# Patient Record
Sex: MALE | Race: WHITE | Employment: PART TIME | URBAN - METROPOLITAN AREA
[De-identification: names, ages, dates, MRNs, and addresses within clinical notes are randomized per-mention and may not be internally consistent; named-entity substitution may affect disease eponyms.]

---

## 2019-01-12 ENCOUNTER — HOSPITAL ENCOUNTER (EMERGENCY)
Facility: CLINIC | Age: 79
Discharge: HOME | End: 2019-01-12
Attending: EMERGENCY MEDICINE | Admitting: EMERGENCY MEDICINE
Payer: COMMERCIAL

## 2019-01-12 VITALS
RESPIRATION RATE: 16 BRPM | SYSTOLIC BLOOD PRESSURE: 174 MMHG | HEART RATE: 97 BPM | WEIGHT: 169 LBS | OXYGEN SATURATION: 97 % | BODY MASS INDEX: 29.95 KG/M2 | DIASTOLIC BLOOD PRESSURE: 88 MMHG | TEMPERATURE: 97.7 F | HEIGHT: 63 IN

## 2019-01-12 DIAGNOSIS — R04.0 EPISTAXIS: ICD-10-CM

## 2019-01-12 PROCEDURE — 99284 EMERGENCY DEPT VISIT MOD MDM: CPT | Mod: 25 | Performed by: EMERGENCY MEDICINE

## 2019-01-12 PROCEDURE — 99284 EMERGENCY DEPT VISIT MOD MDM: CPT

## 2019-01-12 PROCEDURE — 27210182 ZZH KIT NASAL PACKING

## 2019-01-12 PROCEDURE — 30903 CONTROL OF NOSEBLEED: CPT

## 2019-01-12 RX ORDER — LIDOCAINE HYDROCHLORIDE AND EPINEPHRINE 10; 10 MG/ML; UG/ML
INJECTION, SOLUTION INFILTRATION; PERINEURAL
Status: DISCONTINUED
Start: 2019-01-12 | End: 2019-01-12 | Stop reason: HOSPADM

## 2019-01-12 RX ORDER — GINSENG 100 MG
CAPSULE ORAL
Status: DISCONTINUED
Start: 2019-01-12 | End: 2019-01-12 | Stop reason: HOSPADM

## 2019-01-12 RX ORDER — TRANEXAMIC ACID 100 MG/ML
500 INJECTION, SOLUTION INTRAVENOUS ONCE
Status: DISCONTINUED | OUTPATIENT
Start: 2019-01-12 | End: 2019-01-12 | Stop reason: HOSPADM

## 2019-01-12 RX ORDER — OXYMETAZOLINE HYDROCHLORIDE 0.05 G/100ML
SPRAY NASAL
Status: DISCONTINUED
Start: 2019-01-12 | End: 2019-01-12 | Stop reason: HOSPADM

## 2019-01-12 ASSESSMENT — MIFFLIN-ST. JEOR: SCORE: 1391.71

## 2019-01-12 NOTE — ED AVS SNAPSHOT
[facility] Emergency Department  [address]  [facility] [address]  Phone:  [phone]  Fax:  [phone]                                    Jayden Don   MRN: [id number]    Department:  [facility] Emergency Department   Date of Visit:  1/12/2019           After Visit Summary Signature Page    I have received my discharge instructions, and my questions have been answered. I have discussed any challenges I see with this plan with the nurse or doctor.    ..........................................................................................................................................  Patient/Patient Representative Signature      ..........................................................................................................................................  Patient Representative Print Name and Relationship to Patient    ..................................................               ................................................  Date                                   Time    ..........................................................................................................................................  Reviewed by Signature/Title    ...................................................              ..............................................  Date                                               Time          22EPIC Rev 08/18

## 2019-01-13 NOTE — ED PROVIDER NOTES
"  History     Chief Complaint:  Epistaxis    HPI   Jayden Don is a 78 year old male with history of COPD, anxiety, hypertension, hyperlipidemia among others who presents for evaluation of epistaxis. His nose began bleeding while sitting watching TV, and it began \"dripping\". He got the bleeding to stop, but it then restarted just prior to arrival to the ED. Clamp was applied on arrival to the ED. Bleeding is from the right nare. He is not on blood thinners aside from a baby aspirin. No recent changes in his health. No rash.      Allergies:  Atorvastatin - myalgias    Medications:    ANDROGEL 50 MG/5GM TD PACK  ASPIRIN 325 MG OR TBEC  GLUCOSAMINE-CHONDROITIN 750-600 MG OR TABS  TRIAMTERENE-HCTZ 37.5-25 MG OR CAPS  VIAGRA 100 MG OR TABS  VITAMIN E 800 IU OR CAPS  ZITHROMAX Z-CHETAN 250 MG OR CAPS    Past Medical History:    Obstructive chronic bronchitis with acute exacerbation  Hyperlipidemia  Hypertension  Inguinal hernia  Major depressive disorder  Anxiety  Pulmonary fibrosis  Type 2 diabetes mellitus  Diverticulosis  Coronary artery disease - with stenosis of left carotid artery     Past Surgical History:    Nasal sinus surgery   Finger trigger release    Family History:    CAD  Breast cancer  Macular degeneration  Neurologic disorder - parkinson    Social History:  Relationship status:   Tobacco use: No  Alcohol use: Yes  The patient presents with his wife.    Marital Status:   [2]     Review of Systems   HENT: Positive for nosebleeds.    Skin: Negative for rash.   All other systems reviewed and are negative.      Physical Exam     Patient Vitals for the past 24 hrs:   BP Pulse Resp SpO2 Height Weight   01/12/19 1838 174/88 106 18 97 % 1.6 m (5' 3\") 76.7 kg (169 lb)        Physical Exam    General: Patient is alert and interactive when I enter the room  Head:  The scalp, face, and head appear normal  Eyes:  Conjunctivae are normal  ENT:    Bleeding in the right nare. Area bleeding was kesselbachs " triangle, tried multiple interventions but bled through them.     Pinnae are normal    External acoustic canals are normal  Neck:  Trachea midline  CV:  Pulses are normal.   Resp:  No respiratory distress   Musc:  Normal muscular tone    No major joint effusions    No asymmetric leg swelling    Good capillary refill noted  Skin:  No rash or lesions noted. No petichiae.   Neuro: Speech is normal and fluent. Face is symmetric.     Moving all extremities well.   Psych:  Awake. Alert.  Normal affect.  Appropriate interactions.    Emergency Department Course     Procedures:    PROCEDURE NOTE:     Physician: Juan Carlos Kaye MD    Procedure:  Nosebleed/Epistaxis management    Technique:  The patient's affected nare was prepped with atomized lidocaine 1% with epinephrine.  Any obvious bleeding areas were cauterized with silver nitrate.  The nose was packed with a Rapid Rhino, and the bleeding was controlled.  After a period of observation, the patient was reassessed and no further bleeding is noted.  There were no complications to the procedure.     Interventions:  1913 Tranexamic acid spray 500 mg intranasal.   Medications   oxymetazoline (AFRIN) 0.05 % spray (not administered)   tranexamic acid (CYKLOKAPRON) spray 500 mg (not administered)   silver nitrate (ARZOL) Misc 4 applicator (not administered)   silver nitrate (ARZOL) Misc 4 applicator (not administered)   lidocaine 1% with EPINEPHrine 1:100,000 1 %-1:[id number] injection (not administered)   bacitracin 500 UNIT/GM ointment (not administered)        Emergency Department Course:  Nursing notes and vitals reviewed.    1841 I performed an exam of the patient as documented above.   1913 I reassessed the patient. I administered tranexamic acid to the right nare.   1948 I performed the nasal cautery and packing procedure.    Findings and plan explained to the patient and spouse. Patient discharged home with instructions regarding supportive care, medications, and reasons to  return. The importance of close follow-up was reviewed.      I personally reviewed the laboratory results with the patient and spouse and answered all related questions prior to discharge.    Impression & Plan      Medical Decision Making:  Jayden Don is a 78 year old male who presents for evaluation of nasal bleeding and epistaxis.  The bleeding was controlled finally with packing; tried silver nitrate multiple times, afrin and TXA without success before this. Close follow-up with ENT and primary care; see discharge instructions. There are no signs of coagulopathy causing the bleeding or a general medical condition (thrombocytopenia, DIC, leukemia, etc) causing the bleeding today.    The bleeding continued despite attempts at conservative management.  The risks and benefits of packing were discussed with patient verbally and they consented to packing.  See procedure note above for packing.  There was no bleeding after the nasal pack was placed.  I will place patient on prophylactic antibiotics to minimize risk of sinusitis and toxic shock syndrome.       Diagnosis:    ICD-10-CM    1. Epistaxis R04.0            CMS Diagnoses:   and None            Current Discharge Medication List      CONTINUE these medications which have NOT CHANGED    Details   ANDROGEL 50 MG/5GM TD PACK 1 pack topically q day  Qty: 30, Refills: 11    Comments: last fill date 11/3/03      ASPIRIN 325 MG OR TBEC 1 tab po QD (Once per day)      GLUCOSAMINE-CHONDROITIN 750-600 MG OR TABS 1 tablet at bedtime      LOTRISONE 1-0.05 % EX CREA apply as directed  Qty: 30 gm tube, Refills: 3    Associated Diagnoses: Dermatophytosis of foot      TRIAMTERENE-HCTZ 37.5-25 MG OR CAPS 1 CAPSULE DAILY  Qty: 60, Refills: 0      VIAGRA 100 MG OR TABS 1 TABLET BY MOUTH AS DIRECTED  Qty: 6, Refills: 6      VITAMIN E 800 IU OR CAPS 1 tablet daily      ZITHROMAX Z-CHETAN 250 MG OR CAPS 2 tablets day 1, 1 tablet days 2-5  Qty: 6, Refills: 0             Scribe  Disclosure:  I, Stephanie Byrd, am serving as a scribe at 6:39 PM on 1/12/2019 to document services personally performed by Juan Carlos Kaye MD, based on my observations and the provider's statements to me.     Stephanie Byrd  1/12/2019   [facility] EMERGENCY DEPARTMENT       Juan Carlos Kaye MD  01/12/19 0023

## 2019-01-13 NOTE — ED NOTES
RN instructed by MD to remove clamp, have patient blow his nose, and then spray 4 sprays of afrin into right nare and replace clamp.  After patient blew his nose, bright red bleeding from right nare immediately.  RN sprayed 4 sprays of afrin into right nare and replaced clamp.  MD informed.

## 2019-01-13 NOTE — ED TRIAGE NOTES
ABCs intact. Pt c/o epistaxis. Pt states he had a nose bleed this afternoon and again about 15-20 min pta. Pt takes aspirin 81mg daily.

## 2019-05-10 ENCOUNTER — HOSPITAL ENCOUNTER (INPATIENT)
Facility: CLINIC | Age: 79
LOS: 6 days | Discharge: HOME | DRG: 982 | End: 2019-05-16
Attending: EMERGENCY MEDICINE | Admitting: SURGERY
Payer: COMMERCIAL

## 2019-05-10 ENCOUNTER — ANESTHESIA (OUTPATIENT)
Dept: INTERVENTIONAL RADIOLOGY/VASCULAR | Facility: CLINIC | Age: 79
End: 2019-05-10

## 2019-05-10 ENCOUNTER — APPOINTMENT (OUTPATIENT)
Dept: INTERVENTIONAL RADIOLOGY/VASCULAR | Facility: CLINIC | Age: 79
DRG: 982 | End: 2019-05-10
Attending: NURSE PRACTITIONER
Payer: COMMERCIAL

## 2019-05-10 ENCOUNTER — HOSPITAL ENCOUNTER (EMERGENCY)
Facility: CLINIC | Age: 79
Discharge: ANOTHER HOSPITAL | End: 2019-05-10
Attending: EMERGENCY MEDICINE | Admitting: EMERGENCY MEDICINE
Payer: COMMERCIAL

## 2019-05-10 ENCOUNTER — ANESTHESIA EVENT (OUTPATIENT)
Dept: INTERVENTIONAL RADIOLOGY/VASCULAR | Facility: CLINIC | Age: 79
End: 2019-05-10

## 2019-05-10 ENCOUNTER — APPOINTMENT (OUTPATIENT)
Dept: GENERAL RADIOLOGY | Facility: CLINIC | Age: 79
End: 2019-05-10
Attending: EMERGENCY MEDICINE
Payer: COMMERCIAL

## 2019-05-10 ENCOUNTER — APPOINTMENT (OUTPATIENT)
Dept: CT IMAGING | Facility: CLINIC | Age: 79
End: 2019-05-10
Attending: EMERGENCY MEDICINE
Payer: COMMERCIAL

## 2019-05-10 VITALS
SYSTOLIC BLOOD PRESSURE: 86 MMHG | TEMPERATURE: 97.3 F | HEART RATE: 83 BPM | OXYGEN SATURATION: 94 % | BODY MASS INDEX: 29.58 KG/M2 | RESPIRATION RATE: 19 BRPM | DIASTOLIC BLOOD PRESSURE: 57 MMHG | WEIGHT: 167 LBS

## 2019-05-10 DIAGNOSIS — J30.1 SEASONAL ALLERGIC RHINITIS DUE TO POLLEN: ICD-10-CM

## 2019-05-10 DIAGNOSIS — S36.039A LACERATION OF SPLEEN, INITIAL ENCOUNTER: ICD-10-CM

## 2019-05-10 DIAGNOSIS — N40.0 BENIGN PROSTATIC HYPERPLASIA WITHOUT LOWER URINARY TRACT SYMPTOMS: Primary | ICD-10-CM

## 2019-05-10 DIAGNOSIS — S36.039D LACERATION OF SPLEEN, SUBSEQUENT ENCOUNTER: ICD-10-CM

## 2019-05-10 LAB
ABO + RH BLD: NORMAL
ABO + RH BLD: NORMAL
ANION GAP SERPL CALCULATED.3IONS-SCNC: 10 MMOL/L (ref 6–17)
ANION GAP SERPL CALCULATED.3IONS-SCNC: 7 MMOL/L (ref 3–14)
ANION GAP SERPL CALCULATED.3IONS-SCNC: 7 MMOL/L (ref 3–14)
APTT PPP: 26 SEC (ref 22–37)
BASOPHILS # BLD AUTO: 0.1 10E9/L (ref 0–0.2)
BASOPHILS NFR BLD AUTO: 0.3 %
BLD GP AB SCN SERPL QL: NORMAL
BLD PROD TYP BPU: NORMAL
BLD UNIT ID BPU: 0
BLOOD BANK CMNT PATIENT-IMP: NORMAL
BPU ID: NORMAL
BUN SERPL-MCNC: 18 MG/DL (ref 7–30)
BUN SERPL-MCNC: 20 MG/DL (ref 7–30)
BUN SERPL-MCNC: 23 MG/DL (ref 7–30)
CA-I BLD-SCNC: 4.6 MG/DL (ref 4.4–5.2)
CA-I BLD-SCNC: 4.8 MG/DL (ref 4.4–5.2)
CALCIUM SERPL-MCNC: 7.8 MG/DL (ref 8.5–10.1)
CALCIUM SERPL-MCNC: 8.6 MG/DL (ref 8.5–10.1)
CHLORIDE BLD-SCNC: 102 MMOL/L (ref 94–109)
CHLORIDE SERPL-SCNC: 103 MMOL/L (ref 94–109)
CHLORIDE SERPL-SCNC: 109 MMOL/L (ref 94–109)
CO2 BLD-SCNC: 26 MMOL/L (ref 20–32)
CO2 BLDCOV-SCNC: 24 MMOL/L (ref 21–28)
CO2 SERPL-SCNC: 24 MMOL/L (ref 20–32)
CO2 SERPL-SCNC: 28 MMOL/L (ref 20–32)
CREAT BLD-MCNC: 1 MG/DL (ref 0.66–1.25)
CREAT BLD-MCNC: 1 MG/DL (ref 0.66–1.25)
CREAT SERPL-MCNC: 0.89 MG/DL (ref 0.66–1.25)
CREAT SERPL-MCNC: 0.99 MG/DL (ref 0.66–1.25)
DIFFERENTIAL METHOD BLD: ABNORMAL
EOSINOPHIL # BLD AUTO: 0 10E9/L (ref 0–0.7)
EOSINOPHIL NFR BLD AUTO: 0.2 %
ERYTHROCYTE [DISTWIDTH] IN BLOOD BY AUTOMATED COUNT: 12.9 % (ref 10–15)
ERYTHROCYTE [DISTWIDTH] IN BLOOD BY AUTOMATED COUNT: 12.9 % (ref 10–15)
ERYTHROCYTE [DISTWIDTH] IN BLOOD BY AUTOMATED COUNT: 13.2 % (ref 10–15)
GFR SERPL CREATININE-BSD FRML MDRD: 73 ML/MIN/{1.73_M2}
GFR SERPL CREATININE-BSD FRML MDRD: 83 ML/MIN/{1.73_M2}
GLUCOSE BLD-MCNC: 231 MG/DL (ref 70–99)
GLUCOSE BLD-MCNC: 253 MG/DL (ref 70–99)
GLUCOSE BLDC GLUCOMTR-MCNC: 218 MG/DL (ref 70–99)
GLUCOSE BLDC GLUCOMTR-MCNC: 225 MG/DL (ref 70–99)
GLUCOSE SERPL-MCNC: 230 MG/DL (ref 70–99)
GLUCOSE SERPL-MCNC: 304 MG/DL (ref 70–99)
HCT VFR BLD AUTO: 36.8 % (ref 40–53)
HCT VFR BLD AUTO: 38.1 % (ref 40–53)
HCT VFR BLD AUTO: 42.4 % (ref 40–53)
HCT VFR BLD CALC: 34 %PCV (ref 40–53)
HCT VFR BLD CALC: 35 %PCV (ref 40–53)
HGB BLD CALC-MCNC: 11.6 G/DL (ref 13.3–17.7)
HGB BLD CALC-MCNC: 11.9 G/DL (ref 13.3–17.7)
HGB BLD-MCNC: 11.9 G/DL (ref 13.3–17.7)
HGB BLD-MCNC: 12.4 G/DL (ref 13.3–17.7)
HGB BLD-MCNC: 12.4 G/DL (ref 13.3–17.7)
HGB BLD-MCNC: 12.8 G/DL (ref 13.3–17.7)
HGB BLD-MCNC: 14.3 G/DL (ref 13.3–17.7)
IMM GRANULOCYTES # BLD: 0.1 10E9/L (ref 0–0.4)
IMM GRANULOCYTES NFR BLD: 0.5 %
INR PPP: 0.98 (ref 0.86–1.14)
INR PPP: 1.08 (ref 0.86–1.14)
INR PPP: 1.08 (ref 0.86–1.14)
INTERPRETATION ECG - MUSE: NORMAL
LYMPHOCYTES # BLD AUTO: 1.1 10E9/L (ref 0.8–5.3)
LYMPHOCYTES NFR BLD AUTO: 7.2 %
MCH RBC QN AUTO: 30.5 PG (ref 26.5–33)
MCH RBC QN AUTO: 30.5 PG (ref 26.5–33)
MCH RBC QN AUTO: 30.6 PG (ref 26.5–33)
MCHC RBC AUTO-ENTMCNC: 33.6 G/DL (ref 31.5–36.5)
MCHC RBC AUTO-ENTMCNC: 33.7 G/DL (ref 31.5–36.5)
MCHC RBC AUTO-ENTMCNC: 33.7 G/DL (ref 31.5–36.5)
MCV RBC AUTO: 90 FL (ref 78–100)
MCV RBC AUTO: 91 FL (ref 78–100)
MCV RBC AUTO: 91 FL (ref 78–100)
MONOCYTES # BLD AUTO: 0.9 10E9/L (ref 0–1.3)
MONOCYTES NFR BLD AUTO: 6 %
MRSA DNA SPEC QL NAA+PROBE: NEGATIVE
NEUTROPHILS # BLD AUTO: 13.2 10E9/L (ref 1.6–8.3)
NEUTROPHILS NFR BLD AUTO: 85.8 %
NRBC # BLD AUTO: 0 10*3/UL
NRBC BLD AUTO-RTO: 0 /100
PCO2 BLDV: 51 MM HG (ref 40–50)
PH BLDV: 7.29 PH (ref 7.32–7.43)
PLATELET # BLD AUTO: 104 10E9/L (ref 150–450)
PLATELET # BLD AUTO: 108 10E9/L (ref 150–450)
PLATELET # BLD AUTO: 94 10E9/L (ref 150–450)
PO2 BLDV: 30 MM HG (ref 25–47)
POTASSIUM BLD-SCNC: 3.7 MMOL/L (ref 3.4–5.3)
POTASSIUM BLD-SCNC: 4 MMOL/L (ref 3.4–5.3)
POTASSIUM SERPL-SCNC: 4 MMOL/L (ref 3.4–5.3)
POTASSIUM SERPL-SCNC: 4 MMOL/L (ref 3.4–5.3)
RBC # BLD AUTO: 4.05 10E12/L (ref 4.4–5.9)
RBC # BLD AUTO: 4.2 10E12/L (ref 4.4–5.9)
RBC # BLD AUTO: 4.69 10E12/L (ref 4.4–5.9)
SAO2 % BLDV FROM PO2: 50 %
SODIUM BLD-SCNC: 138 MMOL/L (ref 133–144)
SODIUM BLD-SCNC: 141 MMOL/L (ref 133–144)
SODIUM SERPL-SCNC: 139 MMOL/L (ref 133–144)
SODIUM SERPL-SCNC: 140 MMOL/L (ref 133–144)
SPECIMEN EXP DATE BLD: NORMAL
SPECIMEN SOURCE: NORMAL
TRANSFUSION STATUS PATIENT QL: NORMAL
WBC # BLD AUTO: 15.4 10E9/L (ref 4–11)
WBC # BLD AUTO: 18.6 10E9/L (ref 4–11)
WBC # BLD AUTO: 30.6 10E9/L (ref 4–11)

## 2019-05-10 PROCEDURE — 99222 1ST HOSP IP/OBS MODERATE 55: CPT | Performed by: NURSE PRACTITIONER

## 2019-05-10 PROCEDURE — 85014 HEMATOCRIT: CPT | Mod: 91

## 2019-05-10 PROCEDURE — 86850 RBC ANTIBODY SCREEN: CPT | Performed by: EMERGENCY MEDICINE

## 2019-05-10 PROCEDURE — 85025 COMPLETE CBC W/AUTO DIFF WBC: CPT | Performed by: EMERGENCY MEDICINE

## 2019-05-10 PROCEDURE — B4131ZZ FLUOROSCOPY OF SPLENIC ARTERIES USING LOW OSMOLAR CONTRAST: ICD-10-PCS | Performed by: RADIOLOGY

## 2019-05-10 PROCEDURE — 36430 TRANSFUSION BLD/BLD COMPNT: CPT

## 2019-05-10 PROCEDURE — 71045 X-RAY EXAM CHEST 1 VIEW: CPT

## 2019-05-10 PROCEDURE — C1769 GUIDE WIRE: HCPCS

## 2019-05-10 PROCEDURE — 86901 BLOOD TYPING SEROLOGIC RH(D): CPT | Performed by: EMERGENCY MEDICINE

## 2019-05-10 PROCEDURE — 27210888 ZZH ACCESSORY CR7

## 2019-05-10 PROCEDURE — 82565 ASSAY OF CREATININE: CPT

## 2019-05-10 PROCEDURE — 85014 HEMATOCRIT: CPT

## 2019-05-10 PROCEDURE — 99152 MOD SED SAME PHYS/QHP 5/>YRS: CPT

## 2019-05-10 PROCEDURE — 99153 MOD SED SAME PHYS/QHP EA: CPT

## 2019-05-10 PROCEDURE — 85610 PROTHROMBIN TIME: CPT

## 2019-05-10 PROCEDURE — 25000128 H RX IP 250 OP 636: Performed by: NURSE PRACTITIONER

## 2019-05-10 PROCEDURE — 86900 BLOOD TYPING SEROLOGIC ABO: CPT | Performed by: EMERGENCY MEDICINE

## 2019-05-10 PROCEDURE — 99205 OFFICE O/P NEW HI 60 MIN: CPT | Performed by: SURGERY

## 2019-05-10 PROCEDURE — 84295 ASSAY OF SERUM SODIUM: CPT

## 2019-05-10 PROCEDURE — 74177 CT ABD & PELVIS W/CONTRAST: CPT

## 2019-05-10 PROCEDURE — 76705 ECHO EXAM OF ABDOMEN: CPT

## 2019-05-10 PROCEDURE — 84132 ASSAY OF SERUM POTASSIUM: CPT

## 2019-05-10 PROCEDURE — 27210804 ZZH SHEATH CR3

## 2019-05-10 PROCEDURE — 25000132 ZZH RX MED GY IP 250 OP 250 PS 637

## 2019-05-10 PROCEDURE — 99291 CRITICAL CARE FIRST HOUR: CPT | Mod: 25 | Performed by: EMERGENCY MEDICINE

## 2019-05-10 PROCEDURE — 87641 MR-STAPH DNA AMP PROBE: CPT

## 2019-05-10 PROCEDURE — 25000132 ZZH RX MED GY IP 250 OP 250 PS 637: Performed by: NURSE PRACTITIONER

## 2019-05-10 PROCEDURE — 00000146 ZZHCL STATISTIC GLUCOSE BY METER IP

## 2019-05-10 PROCEDURE — 36415 COLL VENOUS BLD VENIPUNCTURE: CPT | Performed by: NURSE PRACTITIONER

## 2019-05-10 PROCEDURE — 82330 ASSAY OF CALCIUM: CPT

## 2019-05-10 PROCEDURE — 37244 VASC EMBOLIZE/OCCLUDE BLEED: CPT

## 2019-05-10 PROCEDURE — C1760 CLOSURE DEV, VASC: HCPCS

## 2019-05-10 PROCEDURE — 99285 EMERGENCY DEPT VISIT HI MDM: CPT | Mod: 25

## 2019-05-10 PROCEDURE — 85018 HEMOGLOBIN: CPT | Performed by: NURSE PRACTITIONER

## 2019-05-10 PROCEDURE — 85610 PROTHROMBIN TIME: CPT | Performed by: NURSE PRACTITIONER

## 2019-05-10 PROCEDURE — 27810275 ZZH COIL/EMBOLIC DEVICE CR9

## 2019-05-10 PROCEDURE — G0390 TRAUMA RESPONS W/HOSP CRITI: HCPCS | Performed by: EMERGENCY MEDICINE

## 2019-05-10 PROCEDURE — 25000128 H RX IP 250 OP 636: Performed by: EMERGENCY MEDICINE

## 2019-05-10 PROCEDURE — 25500064 ZZH RX 255 OP 636: Performed by: RADIOLOGY

## 2019-05-10 PROCEDURE — 20000004 ZZH R&B ICU UMMC

## 2019-05-10 PROCEDURE — 96375 TX/PRO/DX INJ NEW DRUG ADDON: CPT

## 2019-05-10 PROCEDURE — 82803 BLOOD GASES ANY COMBINATION: CPT

## 2019-05-10 PROCEDURE — 86923 COMPATIBILITY TEST ELECTRIC: CPT | Performed by: EMERGENCY MEDICINE

## 2019-05-10 PROCEDURE — 04V44DZ RESTRICTION OF SPLENIC ARTERY WITH INTRALUMINAL DEVICE, PERCUTANEOUS ENDOSCOPIC APPROACH: ICD-10-PCS | Performed by: RADIOLOGY

## 2019-05-10 PROCEDURE — 27210905 ZZH KIT CR7

## 2019-05-10 PROCEDURE — 80047 BASIC METABLC PNL IONIZED CA: CPT

## 2019-05-10 PROCEDURE — 27210908 ZZH NEEDLE CR4

## 2019-05-10 PROCEDURE — 68200000 ZZH FULL TRAUMA W/O CC LEVEL II

## 2019-05-10 PROCEDURE — C1887 CATHETER, GUIDING: HCPCS

## 2019-05-10 PROCEDURE — 85610 PROTHROMBIN TIME: CPT | Performed by: EMERGENCY MEDICINE

## 2019-05-10 PROCEDURE — 85730 THROMBOPLASTIN TIME PARTIAL: CPT | Performed by: EMERGENCY MEDICINE

## 2019-05-10 PROCEDURE — 80048 BASIC METABOLIC PNL TOTAL CA: CPT | Performed by: EMERGENCY MEDICINE

## 2019-05-10 PROCEDURE — 87640 STAPH A DNA AMP PROBE: CPT

## 2019-05-10 PROCEDURE — 96374 THER/PROPH/DIAG INJ IV PUSH: CPT | Mod: 59

## 2019-05-10 PROCEDURE — 36415 COLL VENOUS BLD VENIPUNCTURE: CPT

## 2019-05-10 PROCEDURE — 36247 INS CATH ABD/L-EXT ART 3RD: CPT

## 2019-05-10 PROCEDURE — 25800030 ZZH RX IP 258 OP 636

## 2019-05-10 PROCEDURE — 96361 HYDRATE IV INFUSION ADD-ON: CPT

## 2019-05-10 PROCEDURE — 75726 ARTERY X-RAYS ABDOMEN: CPT

## 2019-05-10 PROCEDURE — 75774 ARTERY X-RAY EACH VESSEL: CPT

## 2019-05-10 PROCEDURE — 27210914 ZZH SHEATH CR8

## 2019-05-10 PROCEDURE — 25000125 ZZHC RX 250: Performed by: RADIOLOGY

## 2019-05-10 PROCEDURE — 82947 ASSAY GLUCOSE BLOOD QUANT: CPT

## 2019-05-10 PROCEDURE — 93005 ELECTROCARDIOGRAM TRACING: CPT | Performed by: EMERGENCY MEDICINE

## 2019-05-10 PROCEDURE — 85027 COMPLETE CBC AUTOMATED: CPT

## 2019-05-10 PROCEDURE — 25000128 H RX IP 250 OP 636: Performed by: RADIOLOGY

## 2019-05-10 PROCEDURE — 27210780 ZZH KIT CR3

## 2019-05-10 PROCEDURE — P9016 RBC LEUKOCYTES REDUCED: HCPCS | Performed by: EMERGENCY MEDICINE

## 2019-05-10 PROCEDURE — 93010 ELECTROCARDIOGRAM REPORT: CPT | Mod: Z6 | Performed by: EMERGENCY MEDICINE

## 2019-05-10 PROCEDURE — 99233 SBSQ HOSP IP/OBS HIGH 50: CPT | Mod: GC | Performed by: ANESTHESIOLOGY

## 2019-05-10 PROCEDURE — 85027 COMPLETE CBC AUTOMATED: CPT | Performed by: NURSE PRACTITIONER

## 2019-05-10 PROCEDURE — 27210889 ZZH ACCESSORY CR8

## 2019-05-10 PROCEDURE — 80048 BASIC METABOLIC PNL TOTAL CA: CPT | Performed by: NURSE PRACTITIONER

## 2019-05-10 PROCEDURE — 99285 EMERGENCY DEPT VISIT HI MDM: CPT | Mod: 25 | Performed by: EMERGENCY MEDICINE

## 2019-05-10 PROCEDURE — 27210732 ZZH ACCESSORY CR1

## 2019-05-10 PROCEDURE — 40000827 ZZH STATISTIC TRAUMA CODE W/ ACCESS

## 2019-05-10 PROCEDURE — 25000131 ZZH RX MED GY IP 250 OP 636 PS 637: Performed by: NURSE PRACTITIONER

## 2019-05-10 RX ORDER — HYDROMORPHONE HYDROCHLORIDE 1 MG/ML
0.5 INJECTION, SOLUTION INTRAMUSCULAR; INTRAVENOUS; SUBCUTANEOUS
Status: DISCONTINUED | OUTPATIENT
Start: 2019-05-10 | End: 2019-05-10 | Stop reason: HOSPADM

## 2019-05-10 RX ORDER — DEXTROSE MONOHYDRATE 25 G/50ML
25-50 INJECTION, SOLUTION INTRAVENOUS
Status: DISCONTINUED | OUTPATIENT
Start: 2019-05-10 | End: 2019-05-10

## 2019-05-10 RX ORDER — SIMVASTATIN 10 MG
20 TABLET ORAL EVERY EVENING
Status: DISCONTINUED | OUTPATIENT
Start: 2019-05-10 | End: 2019-05-16 | Stop reason: HOSPADM

## 2019-05-10 RX ORDER — FLUMAZENIL 0.1 MG/ML
0.2 INJECTION, SOLUTION INTRAVENOUS
Status: DISCONTINUED | OUTPATIENT
Start: 2019-05-10 | End: 2019-05-10 | Stop reason: HOSPADM

## 2019-05-10 RX ORDER — HYDROMORPHONE HCL/0.9% NACL/PF 0.2MG/0.2
0.2 SYRINGE (ML) INTRAVENOUS
Status: DISCONTINUED | OUTPATIENT
Start: 2019-05-10 | End: 2019-05-11

## 2019-05-10 RX ORDER — METHOCARBAMOL 500 MG/1
500 TABLET, FILM COATED ORAL EVERY 6 HOURS
Status: DISCONTINUED | OUTPATIENT
Start: 2019-05-10 | End: 2019-05-13

## 2019-05-10 RX ORDER — IOPAMIDOL 755 MG/ML
500 INJECTION, SOLUTION INTRAVASCULAR ONCE
Status: COMPLETED | OUTPATIENT
Start: 2019-05-10 | End: 2019-05-10

## 2019-05-10 RX ORDER — LABETALOL 20 MG/4 ML (5 MG/ML) INTRAVENOUS SYRINGE
10-20 ONCE
Status: DISCONTINUED | OUTPATIENT
Start: 2019-05-10 | End: 2019-05-16 | Stop reason: HOSPADM

## 2019-05-10 RX ORDER — ACETAMINOPHEN 325 MG/1
975 TABLET ORAL EVERY 6 HOURS PRN
Status: DISCONTINUED | OUTPATIENT
Start: 2019-05-10 | End: 2019-05-10

## 2019-05-10 RX ORDER — KETOROLAC TROMETHAMINE 30 MG/ML
30 INJECTION, SOLUTION INTRAMUSCULAR; INTRAVENOUS EVERY 6 HOURS PRN
Status: DISCONTINUED | OUTPATIENT
Start: 2019-05-10 | End: 2019-05-10

## 2019-05-10 RX ORDER — SODIUM CHLORIDE 9 MG/ML
INJECTION, SOLUTION INTRAVENOUS CONTINUOUS
Status: DISCONTINUED | OUTPATIENT
Start: 2019-05-10 | End: 2019-05-10

## 2019-05-10 RX ORDER — ACETAMINOPHEN 500 MG
500 TABLET ORAL EVERY 6 HOURS PRN
Status: DISCONTINUED | OUTPATIENT
Start: 2019-05-10 | End: 2019-05-10

## 2019-05-10 RX ORDER — AMOXICILLIN 250 MG
2 CAPSULE ORAL 2 TIMES DAILY
Status: DISCONTINUED | OUTPATIENT
Start: 2019-05-10 | End: 2019-05-12

## 2019-05-10 RX ORDER — NALOXONE HYDROCHLORIDE 0.4 MG/ML
.1-.4 INJECTION, SOLUTION INTRAMUSCULAR; INTRAVENOUS; SUBCUTANEOUS
Status: DISCONTINUED | OUTPATIENT
Start: 2019-05-10 | End: 2019-05-16 | Stop reason: HOSPADM

## 2019-05-10 RX ORDER — NICOTINE POLACRILEX 4 MG
15-30 LOZENGE BUCCAL
Status: DISCONTINUED | OUTPATIENT
Start: 2019-05-10 | End: 2019-05-16 | Stop reason: HOSPADM

## 2019-05-10 RX ORDER — IODIXANOL 320 MG/ML
150 INJECTION, SOLUTION INTRAVASCULAR ONCE
Status: COMPLETED | OUTPATIENT
Start: 2019-05-10 | End: 2019-05-10

## 2019-05-10 RX ORDER — NICOTINE POLACRILEX 4 MG
15-30 LOZENGE BUCCAL
Status: DISCONTINUED | OUTPATIENT
Start: 2019-05-10 | End: 2019-05-10

## 2019-05-10 RX ORDER — FENTANYL CITRATE 50 UG/ML
75 INJECTION, SOLUTION INTRAMUSCULAR; INTRAVENOUS ONCE
Status: COMPLETED | OUTPATIENT
Start: 2019-05-10 | End: 2019-05-10

## 2019-05-10 RX ORDER — POLYETHYLENE GLYCOL 3350 17 G/17G
17 POWDER, FOR SOLUTION ORAL DAILY
Status: DISCONTINUED | OUTPATIENT
Start: 2019-05-11 | End: 2019-05-12

## 2019-05-10 RX ORDER — HEPARIN SOD,PORCINE/0.9 % NACL 5K/1000 ML
1000-10000 INTRAVENOUS SOLUTION INTRAVENOUS
Status: COMPLETED | OUTPATIENT
Start: 2019-05-10 | End: 2019-05-10

## 2019-05-10 RX ORDER — ACETAMINOPHEN 325 MG/1
975 TABLET ORAL EVERY 8 HOURS
Status: DISCONTINUED | OUTPATIENT
Start: 2019-05-10 | End: 2019-05-16 | Stop reason: HOSPADM

## 2019-05-10 RX ORDER — NALOXONE HYDROCHLORIDE 0.4 MG/ML
.1-.4 INJECTION, SOLUTION INTRAMUSCULAR; INTRAVENOUS; SUBCUTANEOUS
Status: DISCONTINUED | OUTPATIENT
Start: 2019-05-10 | End: 2019-05-10 | Stop reason: HOSPADM

## 2019-05-10 RX ORDER — SODIUM CHLORIDE, SODIUM LACTATE, POTASSIUM CHLORIDE, CALCIUM CHLORIDE 600; 310; 30; 20 MG/100ML; MG/100ML; MG/100ML; MG/100ML
INJECTION, SOLUTION INTRAVENOUS CONTINUOUS
Status: DISCONTINUED | OUTPATIENT
Start: 2019-05-10 | End: 2019-05-11

## 2019-05-10 RX ORDER — LIDOCAINE 4 G/G
1 PATCH TOPICAL EVERY 24 HOURS
Status: DISCONTINUED | OUTPATIENT
Start: 2019-05-10 | End: 2019-05-16 | Stop reason: HOSPADM

## 2019-05-10 RX ORDER — SODIUM CHLORIDE, SODIUM LACTATE, POTASSIUM CHLORIDE, CALCIUM CHLORIDE 600; 310; 30; 20 MG/100ML; MG/100ML; MG/100ML; MG/100ML
INJECTION, SOLUTION INTRAVENOUS CONTINUOUS
Status: DISCONTINUED | OUTPATIENT
Start: 2019-05-10 | End: 2019-05-10

## 2019-05-10 RX ORDER — DEXTROSE MONOHYDRATE 25 G/50ML
25-50 INJECTION, SOLUTION INTRAVENOUS
Status: DISCONTINUED | OUTPATIENT
Start: 2019-05-10 | End: 2019-05-16 | Stop reason: HOSPADM

## 2019-05-10 RX ORDER — FENTANYL CITRATE 50 UG/ML
25-50 INJECTION, SOLUTION INTRAMUSCULAR; INTRAVENOUS EVERY 5 MIN PRN
Status: DISCONTINUED | OUTPATIENT
Start: 2019-05-10 | End: 2019-05-10 | Stop reason: HOSPADM

## 2019-05-10 RX ORDER — OXYCODONE HYDROCHLORIDE 5 MG/1
5 TABLET ORAL EVERY 4 HOURS PRN
Status: DISCONTINUED | OUTPATIENT
Start: 2019-05-10 | End: 2019-05-12

## 2019-05-10 RX ADMIN — SIMVASTATIN 20 MG: 10 TABLET, FILM COATED ORAL at 20:24

## 2019-05-10 RX ADMIN — INSULIN ASPART 2 UNITS: 100 INJECTION, SOLUTION INTRAVENOUS; SUBCUTANEOUS at 16:55

## 2019-05-10 RX ADMIN — IOPAMIDOL 84 ML: 755 INJECTION, SOLUTION INTRAVENOUS at 10:16

## 2019-05-10 RX ADMIN — IODIXANOL 90 ML: 320 INJECTION, SOLUTION INTRAVASCULAR at 14:11

## 2019-05-10 RX ADMIN — FENTANYL CITRATE 100 MCG: 50 INJECTION INTRAMUSCULAR; INTRAVENOUS at 14:14

## 2019-05-10 RX ADMIN — LIDOCAINE HYDROCHLORIDE 10 ML: 10 INJECTION, SOLUTION EPIDURAL; INFILTRATION; INTRACAUDAL; PERINEURAL at 14:14

## 2019-05-10 RX ADMIN — KETOROLAC TROMETHAMINE 30 MG: 30 INJECTION, SOLUTION INTRAMUSCULAR at 14:14

## 2019-05-10 RX ADMIN — MIDAZOLAM HYDROCHLORIDE 1 MG: 1 INJECTION, SOLUTION INTRAMUSCULAR; INTRAVENOUS at 14:13

## 2019-05-10 RX ADMIN — INSULIN ASPART 2 UNITS: 100 INJECTION, SOLUTION INTRAVENOUS; SUBCUTANEOUS at 20:30

## 2019-05-10 RX ADMIN — ACETAMINOPHEN 975 MG: 325 TABLET, FILM COATED ORAL at 16:13

## 2019-05-10 RX ADMIN — SODIUM CHLORIDE 61 ML: 9 INJECTION, SOLUTION INTRAVENOUS at 10:16

## 2019-05-10 RX ADMIN — Medication 0.2 MG: at 16:09

## 2019-05-10 RX ADMIN — LIDOCAINE 1 PATCH: 560 PATCH PERCUTANEOUS; TOPICAL; TRANSDERMAL at 20:24

## 2019-05-10 RX ADMIN — METHOCARBAMOL 500 MG: 500 TABLET, FILM COATED ORAL at 22:33

## 2019-05-10 RX ADMIN — SODIUM CHLORIDE 1000 ML: 9 INJECTION, SOLUTION INTRAVENOUS at 09:56

## 2019-05-10 RX ADMIN — SODIUM CHLORIDE, POTASSIUM CHLORIDE, SODIUM LACTATE AND CALCIUM CHLORIDE: 600; 310; 30; 20 INJECTION, SOLUTION INTRAVENOUS at 20:37

## 2019-05-10 RX ADMIN — Medication 0.5 MG: at 09:53

## 2019-05-10 RX ADMIN — OXYCODONE HYDROCHLORIDE 5 MG: 5 TABLET ORAL at 22:33

## 2019-05-10 RX ADMIN — HEPARIN SODIUM 10000 UNITS: 1000 INJECTION, SOLUTION INTRAVENOUS; SUBCUTANEOUS at 14:15

## 2019-05-10 RX ADMIN — FENTANYL CITRATE: 50 INJECTION, SOLUTION INTRAMUSCULAR; INTRAVENOUS at 10:37

## 2019-05-10 RX ADMIN — METHOCARBAMOL 500 MG: 500 TABLET, FILM COATED ORAL at 16:15

## 2019-05-10 ASSESSMENT — ENCOUNTER SYMPTOMS
ABDOMINAL PAIN: 1
SHORTNESS OF BREATH: 0
ABDOMINAL PAIN: 1
ARTHRALGIAS: 0
NAUSEA: 1
LIGHT-HEADEDNESS: 1
LIGHT-HEADEDNESS: 0
ABDOMINAL DISTENTION: 1
DIZZINESS: 0
VOMITING: 0

## 2019-05-10 ASSESSMENT — ACTIVITIES OF DAILY LIVING (ADL): ADLS_ACUITY_SCORE: 11

## 2019-05-10 NOTE — PROGRESS NOTES
Patient Name: Jayden Don  Medical Record Number: [id number]  Today's Date: 5/10/2019    Procedure: Splenic artery angiogram with intervention.  Proceduralist: MD Rudy., MD Joanne    Sedation start time: 1230  Sedation end time: 1410  Sedation medications administered: Fentanyl:100mcg Versed:1mg Toradol 30mg    Procedure start time: 1228, 1235  Puncture time: 1235  Procedure end time: 1410    Report given to: TORRIE Back  : n/a  Other Notes: Pt arrived to IR room 5 from ED. Consent reviewed. Pt denies any questions or concerns regarding procedure. Pt positioned suprine and monitored per protocol. Pt tolerated procedure without any noted complications. Mynx closure device deployed @ 1410 but failed. Ambulation time is 2010. Pt transferred to  .

## 2019-05-10 NOTE — PROGRESS NOTES
SURGICAL ICU H&P  May 10, 2019      CO-MORBIDITIES:   Laceration of spleen, initial encounter    ASSESSMENT: Jayden Don is a 78 year old male pmhx of DMII and HTN s/p ground level this am with splenic laceration s/p embolization with IR 5/10/19.    PLAN:  Neuro/ pain/ sedation:  - Monitor neurological status; notify the MD for any acute changes in exam  - tylenol, lidocaine, robaxin for pain.  - dilaudid and oxycodone prn for pain    Pulmonary care:   - Supplemental oxygen to keep saturation above 92%  - Incentive spirometer f70-90ric when awake  - Room air    Cardiovascular:    #HTN  - Monitor hemodynamic status  - holding pta triamterene-hydrochlorothiazide    GI care:   #splenic laceration  - s/p emobolization w/ IR 5/10/19.  - NPO except ice chips and medications  - senna    Fluids/ Electrolytes/ Nutrition:   - 100cc/hr LR for IV fluid hydration  - No indication for parenteral nutrition    Renal/ Fluid Balance:   #DM   - low urine output in IR, had straight cath x1  - Will continue to monitor intake and output  - if not making spontaneous urine on floor. Will consider cathetar    Endocrine:    - Sliding scale for diabetes management     ID/ Antibiotics:  - No indication for antibiotics    Heme:  #splenic laceration  -hgb 12.4, stable at this point, given 1 unit of RBC in ED 2/2 drop in hgb     - Hemoglobin stable at this time    MSK:  -bedrest tonight  -straight leg bed rest x6 hours as per IR s/p embolization    Prophylaxis:    - Mechanical prophylaxis for DVT    Lines/ tubes/ drains:  -PIV x2    Disposition:  - Surgical ICU    Patient seen, findings and plan discussed with surgical ICU staff Dr. Wyman.    Mehdi Orta, PGY1    - - - - - - - - - - - - - - - - - - - - - - - - - - - - - - - - - - - - - - - - - - - - - - - - - - - - - - - - -     PRIMARY TEAM: Trauma  PRIMARY PHYSICIAN:      REASON FOR CRITICAL CARE ADMISSION: Hemodynamic monitoring   ADMITTING PHYSICIAN: Dr. Sism    HISTORY PRESENTING  ILLNESS: Jayden Don is a 78 year old male pmhx of DMII and HTN s/p ground level this am with spenlic laceration s/p embolization with IR 5/10/19. Patient fell this am, ground level fall onto his left side. CT abdomin/pelvis positive for splenic rupture with active bleeding, retroperitoneal hemorrhage, and mild hemoperitoneum.     Patient currently on aspirin only for anticogulation, which he has not been taking he had drop in hemoglobin from 14 to 11.9 in the ED, was given 1 unit of blood, with repeat hgb of 12.4. Patient was taken to IR for embolization.     REVIEW OF SYSTEMS: As noted above. No headache, dizziness. No fever, chills. No chest pain or shortness of breath. No abdominal pain, nausea, vomiting. No diarrhea or constipation. No urinary difficulties. No muscle or body aches.     PAST MEDICAL HISTORY:   Past Medical History:   Diagnosis Date     Diabetes mellitus type 2, controlled, without complications (H)     controlled with diet and exercise      Macular degeneration of right eye     receives injections in right     Obstructive chronic bronchitis with exacerbation (H)        SURGICAL HISTORY:   No past surgical history on file.    SOCIAL HISTORY:  Social History     Tobacco Use     Smoking status: Never Smoker   Substance Use Topics     Alcohol use: Yes     Comment: moderate       FAMILY HISTORY:  Family History   Problem Relation Age of Onset     C.A.D. Mother      Breast Cancer Mother         uterine      Genetic Disorder Mother         machular degeneration     Neurologic Disorder Father         parkinson     Eye Disorder Brother         photophobia        ALLERGIES:      Allergies   Allergen Reactions     Crestor [Rosuvastatin] Other (See Comments)     Joint Pain     No Known Drug Allergies        MEDICATIONS:    Current Facility-Administered Medications on File Prior to Encounter:  [COMPLETED] 0.9% sodium chloride BOLUS   [COMPLETED] 0.9% sodium chloride BOLUS   [COMPLETED] fentaNYL (PF)  (SUBLIMAZE) injection 75 mcg   [COMPLETED] iopamidol (ISOVUE-370) solution 500 mL     Current Outpatient Medications on File Prior to Encounter:  ANDROGEL 50 MG/5GM TD PACK 1 pack topically q day   ASPIRIN 325 MG OR TBEC 1 tab po QD (Once per day)   GLUCOSAMINE-CHONDROITIN 750-600 MG OR TABS 1 tablet at bedtime   LOTRISONE 1-0.05 % EX CREA apply as directed   TRIAMTERENE-HCTZ 37.5-25 MG OR CAPS 1 CAPSULE DAILY   VIAGRA 100 MG OR TABS 1 TABLET BY MOUTH AS DIRECTED   VITAMIN E 800 IU OR CAPS 1 tablet daily   ZITHROMAX Z-CHETAN 250 MG OR CAPS 2 tablets day 1, 1 tablet days 2-5       PHYSICAL EXAMINATION:  Temp:  [97.3  F (36.3  C)-98.2  F (36.8  C)] 98.2  F (36.8  C)  Pulse:  [] 100  Heart Rate:  [] 99  Resp:  [10-30] 12  BP: ()/(28-82) 115/69  SpO2:  [90 %-100 %] 98 %  General: Alert, well-appearing, moderate distress.  HEENT: Normocephalic, atraumatic.  Chest wall: Symmetric thorax. No masses or tenderness to palpation.  Respiratory: Non-labored breathing. Lung sounds clear to auscultation bilaterally.  Cardiovascular: Regular rate and rhythm.  Gastrointestinal: Abdomen soft, non-distended.  Extremities: No limb deformities. No pedal edema.    LABS: Reviewed.   Arterial Blood Gases   No lab results found in last 7 days.  Complete Blood Count   Recent Labs   Lab 05/10/19  1146 05/10/19  1048 05/10/19  0958   WBC 30.6*  --  15.4*   HGB 12.4* 11.9* 14.3   *  --  108*     Basic Metabolic Panel  Recent Labs   Lab 05/10/19  1146 05/10/19  1048 05/10/19  0958    138 139   POTASSIUM 4.0 3.7 4.0   CHLORIDE 109 102 103   CO2 24  --  28   BUN 18 20 23   CR 0.89  --  0.99   * 253* 304*     Liver Function Tests  Recent Labs   Lab 05/10/19  1146 05/10/19  0958   INR 1.08 0.98     Pancreatic Enzymes  No lab results found in last 7 days.  Coagulation Profile  Recent Labs   Lab 05/10/19  1146 05/10/19  0958   INR 1.08 0.98   PTT  --  26     Lactate  Invalid input(s): LACTATE    IMAGING:  Results for  orders placed or performed during the hospital encounter of 05/10/19   Abd/pelvis CT,  IV  contrast only TRAUMA / AAA     Value    Radiologist flags Splenic rupture with active bleeding (AA)    Narrative    CT ABDOMEN AND PELVIS WITH CONTRAST   5/10/2019 10:24 AM     HISTORY:  Fall, left upper quadrant pain.    TECHNIQUE:  84 mL Isovue-370. Radiation dose for this scan was reduced  using automated exposure control, adjustment of the mA and/or kV  according to patient size, or iterative reconstruction technique.    COMPARISON: None.    FINDINGS:  There is splenic rupture with active bleeding. There is  moderate perisplenic hematoma and mild additional hemorrhage extending  down the left retroperitoneum. There is also mild hemoperitoneum. Mild  fatty liver infiltration. There is a fat-containing left inguinal  hernia. Colonic diverticulosis. Prostate enlargement. There is a focal  density at the posteromedial aspect of the right lower lobe which has  transverse dimensions of 3.7 x 1.3 cm. This could represent focal  pneumonia or rounded atelectasis, but a mass lesion cannot be  excluded. Bilateral calcified pleural plaques, with a pattern  suggesting previous asbestos exposure.     [Critical Result: Splenic rupture with active bleeding]    Finding was identified on 5/10/2019 10:29 AM.     Dr. Gill was contacted by me on 5/10/2019 10:30 AM and verbalized  understanding of the critical result.  Nothing else acute is seen in  the upper abdominal organs.       Impression    IMPRESSION:  1. Splenic rupture with active bleeding. Retroperitoneal hemorrhage  and mild hemoperitoneum.  2. 3.7 cm right lower lobe density, as above. Follow up is necessary.  Calcified pleural plaques presumably related to previous asbestos  exposure.  3. Additional findings discussed above.    Recommendations for an incidental lung nodule > 8mm:    Low risk patients: Consider follow-up CT in 3 months, PET/CT, and/or  tissue sampling.    High  risk patients: Same as for low risk patients.    *Low Risk: Minimal or absent history of smoking or other known risk  factors.  *Nonsolid (ground-glass) or partly solid nodules may require longer  follow-up to exclude indolent adenocarcinoma.  *Recommendations based on Guidelines for the Management of Incidental  Pulmonary Nodules Detected at CT: From the Fleischner Society 2017,  Radiology 2017.   POC US ABDOMEN LIMITED    Impression    [facility] Procedure Note      FAST (Focused Assessment with Sonography for Trauma):    PROCEDURE: PERFORMED BY: Dr. Jose Gill  INDICATIONS/SYMPTOM:  Abdominal Pain  PROBE: Low frequency convex probe and Cardiac phased array probe  BODY LOCATION: The ultrasound was performed in the abdominal and chest areas.  FINDINGS: Pericardial Effusion:  Negative  Hepatorenal Area:  Negative  Splenorenal Area:  Unable to visualize  Pelvic area:  Unable to visualize      INTERPRETATION: FAST exam revealed free fluid on suprapubic views. Unable to adequately visualize splenorenal views due to overlying rib shadow.  IMAGE DOCUMENTATION: Images were archived to PACs system.     XR Chest Port 1 View    Narrative    CHEST ONE VIEW PORTABLE  5/10/2019 10:40 AM     HISTORY:  Fall, left lower chest/left upper quadrant abdominal pain.    COMPARISON: None available.      Impression    IMPRESSION:    1. Calcified bilateral pleural plaques, which may well relate to  previous asbestos exposure.  2. Suboptimal inspiration with hypoventilatory changes.  3. No pneumothorax.    ARAMIS CUMMINS MD

## 2019-05-10 NOTE — H&P
[facility], [place]    History and Physical / Consult note: Trauma Service    Date of Admission:  5/10/2019  TTA red   Time of Admission/Consult Request (page/call): 05/10/2019 @ 1130am    Time of my evaluation: 05/10/2019@ 1140  Consulting services:  Interventional Radiology    Assessment & Plan   Trauma mechanism:ground level fall  Time/date of injury: 5/10/2019 at 0715   Known Injuries:  1. Grade III splenic injury with active extravascation  Other diagnoses:   1. Acute pain  2. DM2  3. Acute blood loss anemia  4. HTN      Procedure: IR angiography with embolization pending    Plan:  1. Admit to trauma, SICU- Dr. Latif post procedure for close hemodynamic monitoring, serial hemoglobin checks.  2. Splenic laceration: Interventional Radiology consultation- embolization  3. Acute blood loss anemia: Hemoglobin 14.3 at initial presentation dropped to 11.9- 1 unit PRBC given prior to procedure  1. Serial every 8 hour hemoglobin checks  2. Hold chemical DVT prophylaxis x 48 hours  3. Bedrest, OK for HOB >30, no spinal precautions  4. Will require post splenectomy vaccines prior to discharge  4. Acute pain: PRN Tylenol, Oxycodone, Lidoderm patches, Hydromorphone for breakthrough pain  5. Resp: no acute issues, routine pulmonary toilet  6. Cardiac: Hx hypertension: hold home antihypertensives for now.   7. GI: NPO until serial hemoglobins stable, MIVF while NPO  8. : strict IO  9. Endocrine: Stress hyperglycemia: No home insulin or oral hyperglycemics. Start Novolog insulin sliding scale.  10. Family updated in ED    Code status: Full code .   General Cares:  GI Prophylaxis: N/A  DVT Prophylaxis: Mechanical given active bleed  Date of last stool/Bowel Regimen:PTa/ ordered  Pulmonary toilet:IS    ETOH: Drinks rafa 3 or 4 times per week. This patient was asked if in the last 3-6 months there has been a time when he had  5 or more drinks in a single day/outing.. Patient answer to the  "screening question was in the negative. No intervention needed.  Primary Care Physician   Violet Guzman    Chief Complaint   \"I fell on the sidewalk\"    History is obtained from the patient    History of Present Illness   Jayden Don is a 78 year old male who presents after a ground level fall that occurred while he was on his way to a 0730 appointment with his nurse practitioner for a pre-op evaluation for varicose veins. While at the appointment, he was examined by the NP and the patient reports that he was doing okay. Upon returning home at 0845, the pain continued. The pain is L flank pain that radiates to the abdomen. Rates it as 5/10 intensity now, \"radiating pain\" that comes and goes. He also reports feeling bloated. Patient reports no dizziness, tingling, numbness. No dizziness or lightheadedness prior to the fall. At this time, also notes left shoulder pain \"feels like I landed on it.\" Only took acetaminophen this AM for pain control. No NSAIDs. Does not take aspirin. No other anticoagulation.    On exam here, GCS 15, denies headache, shortness of breath, chest pain or nausea. He denies feeling lightheaded. He reports left shoulder pain, no numbness or tingling.    Patient's last solid PO intake was at 5:45 AM and last liquid was at 7:45 AM.    Past Medical History    I have reviewed this patient's medical history and updated it with pertinent information if needed.   Past Medical History:   Diagnosis Date     Diabetes mellitus type 2, controlled, without complications (H)     controlled with diet and exercise      HTN (hypertension)     controlled with medication     Macular degeneration of right eye     receives injections q8w     Obstructive chronic bronchitis with exacerbation (H)        Past Surgical History   I have reviewed this patient's surgical history and updated it with pertinent information if needed.  Past Surgical History:   Procedure Laterality Date     OTHER SURGICAL HISTORY      " "Sinus/nose surgery many years ago     OTHER SURGICAL HISTORY      Trigger thumb     Prior to Admission Medications   Prior to Admission Medications   Prescriptions Last Dose Informant Patient Reported? Taking?   ANDROGEL 50 MG/5GM TD PACK   No No   Si pack topically q day   ASPIRIN 325 MG OR TBEC   No No   Si tab po QD (Once per day)   GLUCOSAMINE-CHONDROITIN 750-600 MG OR TABS   No No   Si tablet at bedtime   LOTRISONE 1-0.05 % EX CREA   No No   Sig: apply as directed   TRIAMTERENE-HCTZ 37.5-25 MG OR CAPS   No No   Si CAPSULE DAILY   VIAGRA 100 MG OR TABS   No No   Si TABLET BY MOUTH AS DIRECTED   VITAMIN E 800 IU OR CAPS   No No   Si tablet daily   ZITHROMAX Z-CHETAN 250 MG OR CAPS   No No   Si tablets day 1, 1 tablet days 2-5      Facility-Administered Medications: None     Patient reports taking triamterene, lovastatin, flomax (recently started), and \"a blood pressure medication. Everything else is over the counter.\"    Allergies   Allergies   Allergen Reactions     Crestor [Rosuvastatin] Other (See Comments)     Joint Pain     No Known Drug Allergies        Social History   Social History     Socioeconomic History     Marital status:      Spouse name: Not on file     Number of children: Not on file     Years of education: Not on file     Highest education level: Not on file   Occupational History     Not on file   Social Needs     Financial resource strain: Not on file     Food insecurity:     Worry: Not on file     Inability: Not on file     Transportation needs:     Medical: Not on file     Non-medical: Not on file   Tobacco Use     Smoking status: Never Smoker   Substance and Sexual Activity     Alcohol use: Yes     Comment: moderate     Drug use: Not on file     Sexual activity: Not on file   Lifestyle     Physical activity:     Days per week: Not on file     Minutes per session: Not on file     Stress: Not on file   Relationships     Social connections:     Talks on phone: " Not on file     Gets together: Not on file     Attends L[employer]-day service: Not on file     Active member of club or organization: Not on file     Attends meetings of clubs or organizations: Not on file     Relationship status: Not on file     Intimate partner violence:     Fear of current or ex partner: Not on file     Emotionally abused: Not on file     Physically abused: Not on file     Forced sexual activity: Not on file   Other Topics Concern     Not on file   Social History Narrative     Not on file       Family History   I have reviewed this patient's family history and updated it with pertinent information if needed.   Family History   Problem Relation Age of Onset     C.A.D. Mother      Breast Cancer Mother         uterine      Genetic Disorder Mother         machular degeneration     Neurologic Disorder Father         parkinson     Eye Disorder Brother         photophobia       Review of Systems   CONSTITUTIONAL: No fever, chills, sweats, fatigue   EYES: no visual blurring, no double vision or visual loss  ENT: no decrease in hearing, no tinnitus, no vertigo, no hoarseness  RESPIRATORY: no shortness of breath, no cough, no sputum   CARDIOVASCULAR: no palpitations, no chest  pain, no exertional chest pain or pressure  GASTROINTESTINAL: no nausea or vomiting, or abd pain  GENITOURINARY: no dysuria, no frequency or hesitancy, no hematuria  MUSCULOSKELETAL: no weakness, no redness, no swelling, no joint pain,   SKIN: no rashes, ecchymoses, abrasions or lacerations  NEUROLOGIC: no numbness or tingling of hands, no numbness or tingling  of feet, no syncope, no tremors or weakness  PSYCHIATRIC: no sleep disturbances, no anxiety or depression    Physical Exam   Temp: 98.2  F (36.8  C)(Blood products ended.) Temp src: Oral BP: 111/73 Pulse: 105 Heart Rate: 102 Resp: 18 SpO2: 98 % O2 Device: Nasal cannula Oxygen Delivery: 2 LPM  Vital Signs with Ranges  Temp:  [97.3  F (36.3  C)-98.2  F (36.8  C)] 98.2  F (36.8   C)  Pulse:  [] 105  Heart Rate:  [] 102  Resp:  [10-30] 18  BP: ()/(28-82) 111/73  SpO2:  [90 %-100 %] 98 % 169 lbs 14.4 oz    Primary Survey:  Airway: patient talking  Breathing: symmetric respiratory effort bilaterally  Circulation: central pulses present and peripheral pulses present  Disability: Pupils - left 3 mm and brisk, right 3 mm and brisk     Weott Coma Scale - Total 15/15  Eye Response (E): 4  4= spontaneous,  3= to verbal/voice, 2=  to pain, 1= No response   Verbal Response (V): 5   5= Orientated, converses,  4= Confused, converses, 3= Inappropriate words,  2= Incomprehensible sounds,  1=No response   Motor Response (M): 6   6= Obeys commands, 5= Localizes to pain, 4= Withdrawal to pain, 3=Fexion to pain, 2= Extension to pain, 1= No response    Secondary Survey:  General: alert, oriented to person, place, time  Neuro: PERRLA. EOMI. CN II-XII grossly intact. No focal deficits. Strength 5/5 x 4 extremities.  Sensation intact.  Head: atraumatic, normocephalic, trachea midline  Eyes:  Pupils 3mm, EOMI, corneas and conjunctivae clear  Ears: pearly grey bilateral TMs and non-inflamed external ear canals  Nose: nares patent, no drainage, nasal septum non-tender  Mouth/Throat: no exudates or erythema,  no dental tenderness or malocclusions, no tongue lacerations  Neck:  no cervical collar present. No midline posterior tenderness, full AROM without pain.   Chest/Pulmonary: normal respiratory rate and rhythm,  bilateral clear breath sounds, no wheezes, rales or rhonchi, no chest wall tenderness or deformities,   Cardiovascular: S1, S2,  normal and regular rate and rhythm, no murmurs  Abdomen: Rounded, distended, left upper quadrant tender to palpation, no guarding, no ecchymosis noted on exam  :pelvis stable to lateral compression,  no de la vega, no urine assess  Musculoskel/Extremities: normal extremities, full AROM of major joints without tenderness, edema, erythema, ecchymosis, or abrasions.  +1 PP. no edema.   Back/Spine: no deformity, no midline tenderness, no sacral tenderness, no step-offs and no abrasions or contusions  Hands: no gross deformities of hands or fingers. Full AROM of hand and fingers in flexion and extension.  strength equal and symmetric.   Psychiatric: affect/mood normal, cooperative, normal judgement/insight and memory intact  Skin: no rashes, laceration, ecchymosis, skin warm and dry.     Results for orders placed or performed during the hospital encounter of 05/10/19 (from the past 24 hour(s))   Basic metabolic panel   Result Value Ref Range    Sodium 140 133 - 144 mmol/L    Potassium 4.0 3.4 - 5.3 mmol/L    Chloride 109 94 - 109 mmol/L    Carbon Dioxide 24 20 - 32 mmol/L    Anion Gap 7 3 - 14 mmol/L    Glucose 230 (H) 70 - 99 mg/dL    Urea Nitrogen 18 7 - 30 mg/dL    Creatinine 0.89 0.66 - 1.25 mg/dL    GFR Estimate 83 >60 mL/min/[1.73_m2]    GFR Estimate If Black >90 >60 mL/min/[1.73_m2]    Calcium 7.8 (L) 8.5 - 10.1 mg/dL   CBC with platelets   Result Value Ref Range    WBC 30.6 (H) 4.0 - 11.0 10e9/L    RBC Count 4.05 (L) 4.4 - 5.9 10e12/L    Hemoglobin 12.4 (L) 13.3 - 17.7 g/dL    Hematocrit 36.8 (L) 40.0 - 53.0 %    MCV 91 78 - 100 fl    MCH 30.6 26.5 - 33.0 pg    MCHC 33.7 31.5 - 36.5 g/dL    RDW 12.9 10.0 - 15.0 %    Platelet Count 104 (L) 150 - 450 10e9/L   INR   Result Value Ref Range    INR 1.08 0.86 - 1.14   ABO/Rh type and screen   Result Value Ref Range    ABO A     RH(D) Pos     Antibody Screen Neg     Test Valid Only At          [facility]    Specimen Expires 05/13/2019    ISTAT gases elec ica gluc darrell POCT   Result Value Ref Range    Ph Venous 7.29 (L) 7.32 - 7.43 pH    PCO2 Venous 51 (H) 40 - 50 mm Hg    PO2 Venous 30 25 - 47 mm Hg    Bicarbonate Venous 24 21 - 28 mmol/L    O2 Sat Venous 50 %    Sodium 141 133 - 144 mmol/L    Potassium 4.0 3.4 - 5.3 mmol/L    Glucose 231 (H) 70 - 99 mg/dL    Calcium Ionized 4.8 4.4  - 5.2 mg/dL    Hemoglobin 11.6 (L) 13.3 - 17.7 g/dL    Hematocrit - POCT 34 (L) 40.0 - 53.0 %PCV   EKG 12-lead, complete   Result Value Ref Range    Interpretation ECG Click View Image link to view waveform and result        Studies:  IR Visceral Angiogram    (Results Pending)      agnieszka Goss CNP  Trauma MICHELLE  Pager [phone]

## 2019-05-10 NOTE — ED PROVIDER NOTES
History     Chief Complaint:  Abdominal Pain & Fall      HPI   Jayden Don is a 78 year old male with a history of hypertension, hyperlipidemia, and diabetes who presents to the ED for evaluation of abdominal pain after a mechanical fall. The patient reports that he was heading to a pre-operative appointment for a vein stripping procedure later this month. He had just gotten off the bus at 0730 when he stumbled on the concrete sidewalk and fell onto his left side. The patient denies that he hit his head or lost consciousness, although he began to experience left-sided pleuritic LUQ abdominal pain. This has begun to radiate across his abdomen, and the patient also developed some nausea, abdominal distention, lightheadedness and thirst. He took one Tylenol 30 minutes ago without improvement in his discomfort, and thus presented to the ED for concerns of an injury. Here in the ED he denies any shortness of breath, hip pain, vomiting, or other injuries from the fall. Of note, he states that his blood pressure at baseline is 140/80.    Allergies:  Crestor [Rosuvastatin]  Lipitor    Medications:    Amlodipine  Flonase  Lovastatin  Flomax     Past Medical History:    Varicose veins  DM2  Left carotid artery stenosis  Psoriasis  Ventral hernia  Iridocyclitis  Macular degeneration  Diverticulosis  Hyperlipidemia  Hypertension   Pulmonary fibrosis  Depression  Prostatism   Colonic polyps    Past Surgical History:    Nasal sinus surgery  Finger trigger release    Family History:    CAD  Cancer - breast, uterine  Macular degeneration  Parkinson's Disease    Social History:  Negative for tobacco use.  Alcohol Use: Yes   Marital Status:   [2]     Review of Systems   Respiratory: Negative for shortness of breath.    Gastrointestinal: Positive for abdominal distention, abdominal pain and nausea. Negative for vomiting.   Musculoskeletal: Negative for arthralgias.   Neurological: Positive for light-headedness.   All  other systems reviewed and are negative.      Physical Exam     Patient Vitals for the past 24 hrs:   BP Temp Temp src Pulse Heart Rate Resp SpO2 Weight   05/10/19 1010 -- -- -- -- 86 19 -- --   05/10/19 1009 -- -- -- -- 83 12 -- --   05/10/19 1008 -- -- -- -- 84 16 -- --   05/10/19 1007 -- -- -- -- 87 16 -- --   05/10/19 1006 -- -- -- 83 87 19 94 % --   05/10/19 1005 -- -- -- -- 81 12 92 % --   05/10/19 1004 -- -- -- -- 76 14 94 % --   05/10/19 1003 -- -- -- -- 87 16 96 % --   05/10/19 1002 (!) 86/57 -- -- 84 86 19 90 % --   05/10/19 1001 -- -- -- -- 81 13 95 % --   05/10/19 1000 (!) 64/48 -- -- 84 84 15 97 % --   05/10/19 0959 -- -- -- -- 80 16 96 % --   05/10/19 0958 (!) 82/28 -- -- 80 81 15 96 % --   05/10/19 0957 -- -- -- -- 86 10 99 % --   05/10/19 0956 -- -- -- -- 87 14 99 % --   05/10/19 0955 -- -- -- -- 91 10 98 % --   05/10/19 0954 -- -- -- -- 91 13 -- --   05/10/19 0953 -- -- -- -- 92 15 -- --   05/10/19 0952 -- -- -- -- 91 12 -- --   05/10/19 0951 -- -- -- -- 89 17 -- --   05/10/19 0950 -- -- -- -- 94 18 -- --   05/10/19 0949 -- -- -- -- 92 19 -- --   05/10/19 0948 -- -- -- -- 93 22 -- --   05/10/19 0947 -- -- -- -- 92 14 -- --   05/10/19 0946 -- -- -- -- 94 14 -- --   05/10/19 0945 -- -- -- 93 93 19 100 % --   05/10/19 0944 -- -- -- -- 94 18 98 % --   05/10/19 0943 -- -- -- -- -- 18 99 % --   05/10/19 0942 -- -- -- -- 93 13 100 % --   05/10/19 0941 -- -- -- -- 92 15 98 % --   05/10/19 0940 100/82 -- -- -- 90 20 99 % --   05/10/19 0939 -- -- -- -- 89 28 98 % --   05/10/19 0937 -- -- -- -- 90 16 98 % --   05/10/19 0936 -- -- -- -- 91 22 98 % --   05/10/19 0934 101/74 -- -- 95 -- -- 91 % --   05/10/19 0922 93/66 97.3  F (36.3  C) Temporal -- 96 16 97 % 75.8 kg (167 lb)        Physical Exam    GENERAL:  Pleasant, age appropriate male who appears in discomfort   HEENT:   No scalp hematoma or defect to the bony calvarium.      Riley's and Racoon's sign negative.      Midface is stable.     Oropharynx is  moist, without lesions or trismus.  EYES:  Conjunctiva normal, PERRL  NECK:   C-spine non-tender.      No bony step-off to cervical spine.   CV:    Regular rate and rhythm.     No murmurs, rubs or gallops.    PULM:  Clear to auscultation bilateral.      No respiratory distress.      No subcutaneous emphysema or crepitus..    No tenderness to chest wall or ribs  ABD:   Soft, mild distension    Moderate to severe LUQ tenderness    No CVA tenderness    No pulsatile masses.  No rebound or guarding.    No rigidity  MSK:    No focal bony tenderness to the extremities.      Upper and lower extremities taken through full ROM without significant pain or limited ROM.    No tenderness to thoracic or lumbar spine  LYMPH:  No cervical lymphadenopathy.  NEURO:  Alert and oriented x 3. GCS 15.      CN II-XII intact, speech is clear with no aphasia.      Strength is 5/5 in all 4 extremities.  Sensation is intact.      Normal muscular tone, no tremor.  SKIN:   Warm, dry and intact.    PSYCH:   Mood is good and affect is appropriate.      Emergency Department Course   ECG:  Indication: Full trauma activation  Time: 1032  Vent. Rate 100 bpm. MN interval 152. QRS duration 82. QT/QTc 346/446. P-R-T axis 59 6 -15.  Normal sinus rhythm. Nonspecific ST and T wave abnormality. Abnormal ECG. No significant change compared to EKG dated 5/29/03. Read time: 1033     Imaging:  Radiographic findings were communicated with the patient who voiced understanding of the findings.    XR Chest Portable 1 View:   1. Calcified bilateral pleural plaques, which may well relate to  previous asbestos exposure.  2. Suboptimal inspiration with hypoventilatory changes.  3. No pneumothorax, as per radiology.     CT Abdomen/Pelvis with IV contrast only TRAUMA/AAA:   1. Splenic rupture with active bleeding. Retroperitoneal hemorrhage  and mild hemoperitoneum.  2. 3.7 cm right lower lobe density, as above. Follow up is necessary.  Calcified pleural plaques  presumably related to previous asbestos  exposure.  3. Additional findings discussed above.    Recommendations for an incidental lung nodule > 8mm:    Low risk patients: Consider follow-up CT in 3 months, PET/CT, and/or  tissue sampling.    High risk patients: Same as for low risk patients.    *Low Risk: Minimal or absent history of smoking or other known risk  factors.  *Nonsolid (ground-glass) or partly solid nodules may require longer  follow-up to exclude indolent adenocarcinoma.  *Recommendations based on Guidelines for the Management of Incidental  Pulmonary Nodules Detected at CT: From the Fleischner Society 2017,  Radiology 2017, as per radiology.     POC US Abdomen, Limited:  FAST exam revealed free fluid on suprapubic views. Unable to adequately visualize splenorenal views due to overlying rib shadow.     Laboratory:  CBC: WBC: 15.4 (H), HGB: 14.3, PLT: 108 (L)  BMP: Glucose 304 (H), o/w WNL (Creatinine: 0.99)    Creatinine POCT: All WNL (Creatinine 1.0)    INR: 0.98  PTT: 26    1048 ISTAT Basic met ICa hematocrit POCT: Glucose 253 (H), HGB 11.9 (L), Hematocrit 35 (L), o/w WNL (Creatinine 1.0)    ABO/Rh type and screen: ABO: A, Rh: Pos, Antibody: pending    Procedures:  Point of care abdominal ultrasound performed, impression above.    Interventions:  0953 Dilaudid 0.5 mg IV  0956 NS 1L IV   1037 Fentanyl 75 mcg IV  1 unit of O- packed RBC's ordered for IV infusion upon transfer    Emergency Department Course:  Nursing notes and vitals reviewed. (1732) I performed an exam of the patient as documented above.     IV inserted. Medicine administered as documented above. Blood drawn. This was sent to the lab for further testing, results above.    (1003) I rechecked the patient and discussed the results of his workup thus far. FAST exam was performed. Second line placed and the patient was sent to CT STAT.     The patient was sent for an abdominal/pelvic CT while in the emergency department, findings above.      (1011) RN staff informed me that the patient's systolic pressure was currently 124.    (1024) I called a full trauma activation at this time.    (1025) I reevaluated the patient and provided an update in regards to his ED course.  He had returned from CT and was moved to the stabilization room.    (1030) The results of the patient's abdominal CT were called to me at this time by Dr. Riley, Radiology.    (1032) I consulted with Dr. Bauman, General Surgery, who had presented to the ED floor regarding the patient's history and presentation here. She viewed the patient's imaging and advised that we transfer him to the [facility].    EKG obtained in the ED, see results above.      (1034)  I consulted with Dr. Molina of the emergency department at the [facility]. They are in agreement to accept the patient for transfer.    (1040) Findings and plan explained to the Patient who consents to transfer. Discussed the patient with Dr. Molina, who will admit the patient to an emergency department bed for further monitoring, evaluation, and treatment.    (1049) The nursing tech informed me that the patient's hemoglobin had dropped to 11.9. I rechecked the patient and obtained consent for blood transfusion.     Patient was sent for a portable chest x-ray, findings above.    Impression & Plan       Trauma:  Level of trauma activation: Full  C-collar and immobilization: not indicated, cleared.  CSpine Clearance: by Nexus Criteria  GCS at arrival: 15  GCS at disposition: unchanged  Full Primary and Secondary survey with appropriate immobilization of spine completed in exam section.  Consults prior to admission or transfer: General surgery  Procedures done in the ED: none    Medical Decision Makin-year-old male presented to the ED with mechanical fall and isolated left upper quadrant abdominal pain.  He denies head trauma and has no external signs of head injury.  He has no tenderness to the ribs  or chest.  Chest x-ray unremarkable for traumatic findings.  Patient was initially hemodynamically stable and was given analgesics with 0.5 mg of hydromorphone which was followed by marked hypotension as low as mid 60's systolic.  I immediately reevaluated the patient in which a second peripheral line was placed, IV fluid bolus was given.  FAST exam revealed free fluid in the pelvis and patient underwent immediate CT scan.     Full trauma activation undertaken given his hypotension.  CT scan unfortunately reveals high-grade spleen laceration with active bleeding.  With IV fluids, the patient's blood pressure has normalized.  Repeat hemoglobin is pending.  Patient evaluated by general surgeon in the ED.  Given the patient's stability, surgeon recommended transfer to trauma center, specifically [facility], for consideration of embolization by interventional radiology versus laparotomy by trauma surgery.  Patient was graciously accepted by the ED physician at [facility].  Patient safe for transfer.    ADDENDUM:  Just prior to transfer the patient's hemoglobin went from 14.3 to11.9.  Due to the active bleeding and significant drop in hemoglobin, patient was given a single unit of packed red blood cells in the ED and will be continued by EMS.  Blood pressure has remained stable.    Critical Care time:  was 45 minutes for this patient excluding procedures.    Diagnosis:    ICD-10-CM    1. Laceration of spleen, initial encounter S36.039A ABO/Rh type and screen     ISTAT Basic Met ICa HCT POCT     ISTAT Basic Met ICa HCT POCT     Blood component     Blood component       Disposition:  Transferred to the [facility] ER via ambulance.      Scribe Disclosure:  I, Natalee Story, am serving as a scribe on 5/10/2019 at 9:32 AM to personally document services performed by Jose Gill MD based on my observations and the provider's statements to me.      Natalee Story  5/10/2019   [place]  [facility] EMERGENCY DEPARTMENT       Jose Gill MD  05/10/19 [id number]

## 2019-05-10 NOTE — PROCEDURES
Interventional Radiology Brief Post Procedure Note    Procedure: IR VISCERAL ANGIOGRAM    Proceduralist: Luis Angel Grant MD    Assistant: IR Fellow Physician, Riley Rubio MD    Time Out: Prior to the start of the procedure and with procedural staff participation, I verbally confirmed the patient s identity using two indicators, relevant allergies, that the procedure was appropriate and matched the consent or emergent situation, and that the correct equipment/implants were available. Immediately prior to starting the procedure I conducted the Time Out with the procedural staff and re-confirmed the patient s name, procedure, and site/side. (The Joint Commission universal protocol was followed.)  Yes    Medications   Medication Event Details Admin User Admin Time   iodixanol (VISIPAQUE 320) injection 150 mL Medication Given Dose: 90 mL; Route: INTRA-ARTERIAL; Scheduled Time: 12:45 PM Howard Marquis ARRT 5/10/2019  2:11 PM   midazolam (VERSED) injection 0.5-2 mg Medication Given Dose: 1 mg; Route: Intravenous Tiara Turner RN 5/10/2019  2:13 PM   ketorolac (TORADOL) injection 30 mg Medication Given Dose: 30 mg; Route: Intravenous Tiara Turner RN 5/10/2019  2:14 PM   lidocaine 1 % 1-30 mL Medication Given by Other Dose: 10 mL; Route: Intradermal Tiara Turner RN 5/10/2019  2:14 PM   fentaNYL (PF) (SUBLIMAZE) injection 25-50 mcg Medication Given Dose: 100 mcg; Route: Intravenous Tiara Turner RN 5/10/2019  2:14 PM   heparin 5,000 units in 0.9% sodium chloride 1000 mL Medication Given Dose: 10,000 Units; Route: TABLE SOLTiara Leo RN 5/10/2019  2:15 PM       Sedation: IR Nurse Monitored Care   Post Procedure Summary:  Prior to the start of the procedure and with procedural staff participation, I verbally confirmed the patient s identity using two indicators, relevant allergies, that the procedure was appropriate and matched the consent or emergent situation, and that the correct equipment/implants  were available. Immediately prior to starting the procedure I conducted the Time Out with the procedural staff and re-confirmed the patient s name, procedure, and site/side. (The Joint Commission universal protocol was followed.)  Yes       Sedatives: Fentanyl and Midazolam (Versed)    Vital signs, airway and pulse oximetry were monitored and remained stable throughout the procedure and sedation was maintained until the procedure was complete.  The patient was monitored by staff until sedation discharge criteria were met.    Patient tolerance: Patient tolerated the procedure well with no immediate complications.    Time of sedation in minutes: See dictation minutes from beginning to end of physician one to one monitoring.          Findings: Splenic artery extravasation resolved s/p particle and coil embolization    Estimated Blood Loss: Minimal    Fluoroscopy Time: 28.5 minute(s)    SPECIMENS: None    Complications: 1. None     Condition: Stable    Plan: Post op observation as ordered.    Comments: See dictated procedure note for full details.    Riley Rubio MD

## 2019-05-10 NOTE — PROGRESS NOTES
Full trauma called after returning from ct at 1025. Family at bedside and informed and updated with pt. Fentanyl given iv for pain management (see MAR). Bedside xray completed 1036. EMS arrived 1055. Decision to transfuse one unit of PRBC per MD d/t drop in HGB per bedside IsTAT. Report called to accepting TORRIE Sanon. Pt belongings sent with family

## 2019-05-10 NOTE — ED NOTES
"Pt fell on his way to pre-op appt at [facility]. Pt states he told staff but nothing was done and pt sent home. Decided to be seen since pain \"severe\".   "

## 2019-05-10 NOTE — ED TRIAGE NOTES
Pt here with c/o L sided abd pain and nausea after falling on concrete this morning. No LOC. Not on blood thinners. ABC intact.

## 2019-05-10 NOTE — H&P
SURGICAL ICU H&P  May 10, 2019      CO-MORBIDITIES:   Laceration of spleen, initial encounter    ASSESSMENT: Jayden Don is a 78 year old male pmhx of DMII and HTN s/p ground level this am with splenic laceration s/p embolization with IR 5/10/19.    PLAN:  Neuro/ pain/ sedation:  - Monitor neurological status; notify the MD for any acute changes in exam  - tylenol, lidocaine, robaxin for pain.  - dilaudid and oxycodone prn for pain    Pulmonary care:   - Supplemental oxygen to keep saturation above 92%  - Incentive spirometer [id number] when awake  - Room air    Cardiovascular:    #HTN  - Monitor hemodynamic status  - holding pta triamterene-hydrochlorothiazide    GI care:   #splenic laceration  - s/p emobolization w/ IR 5/10/19.  - NPO except ice chips and medications  - senna    Fluids/ Electrolytes/ Nutrition:   - 100cc/hr LR for IV fluid hydration  - No indication for parenteral nutrition    Renal/ Fluid Balance:   #DM   - low urine output in IR, had straight cath x1  - Will continue to monitor intake and output  - if not making spontaneous urine on floor. Will consider cathetar    Endocrine:    - Sliding scale for diabetes management     ID/ Antibiotics:  - No indication for antibiotics    Heme:  #splenic laceration  -hgb 12.4, stable at this point, given 1 unit of RBC in ED 2/2 drop in hgb     - Hemoglobin stable at this time    MSK:  -bedrest tonight  -straight leg bed rest x6 hours as per IR s/p embolization    Prophylaxis:    - Mechanical prophylaxis for DVT    Lines/ tubes/ drains:  -PIV x2    Disposition:  - Surgical ICU    Patient seen, findings and plan discussed with surgical ICU staff Dr. Wyman.    Mehdi Orta, PGY1    - - - - - - - - - - - - - - - - - - - - - - - - - - - - - - - - - - - - - - - - - - - - - - - - - - - - - - - - -     PRIMARY TEAM: Trauma  PRIMARY PHYSICIAN:      REASON FOR CRITICAL CARE ADMISSION: Hemodynamic monitoring   ADMITTING PHYSICIAN: Dr. Sims    HISTORY PRESENTING  ILLNESS: Jayden Don is a 78 year old male pmhx of DMII and HTN s/p ground level this am with spenlic laceration s/p embolization with IR 5/10/19. Patient fell this am, ground level fall onto his left side. CT abdomin/pelvis positive for splenic rupture with active bleeding, retroperitoneal hemorrhage, and mild hemoperitoneum.     Patient currently on aspirin only for anticogulation, which he has not been taking he had drop in hemoglobin from 14 to 11.9 in the ED, was given 1 unit of blood, with repeat hgb of 12.4. Patient was taken to IR for embolization.     REVIEW OF SYSTEMS: As noted above. No headache, dizziness. No fever, chills. No chest pain or shortness of breath. No abdominal pain, nausea, vomiting. No diarrhea or constipation. No urinary difficulties. No muscle or body aches.     PAST MEDICAL HISTORY:   Past Medical History:   Diagnosis Date     Diabetes mellitus type 2, controlled, without complications (H)     controlled with diet and exercise      Macular degeneration of right eye     receives injections in right     Obstructive chronic bronchitis with exacerbation (H)        SURGICAL HISTORY:   No past surgical history on file.    SOCIAL HISTORY:  Social History     Tobacco Use     Smoking status: Never Smoker   Substance Use Topics     Alcohol use: Yes     Comment: moderate       FAMILY HISTORY:  Family History   Problem Relation Age of Onset     C.A.D. Mother      Breast Cancer Mother         uterine      Genetic Disorder Mother         machular degeneration     Neurologic Disorder Father         parkinson     Eye Disorder Brother         photophobia        ALLERGIES:      Allergies   Allergen Reactions     Crestor [Rosuvastatin] Other (See Comments)     Joint Pain     No Known Drug Allergies        MEDICATIONS:    Current Facility-Administered Medications on File Prior to Encounter:  [COMPLETED] 0.9% sodium chloride BOLUS   [COMPLETED] 0.9% sodium chloride BOLUS   [COMPLETED] fentaNYL (PF)  (SUBLIMAZE) injection 75 mcg   [COMPLETED] iopamidol (ISOVUE-370) solution 500 mL     Current Outpatient Medications on File Prior to Encounter:  ANDROGEL 50 MG/5GM TD PACK 1 pack topically q day   ASPIRIN 325 MG OR TBEC 1 tab po QD (Once per day)   GLUCOSAMINE-CHONDROITIN 750-600 MG OR TABS 1 tablet at bedtime   LOTRISONE 1-0.05 % EX CREA apply as directed   TRIAMTERENE-HCTZ 37.5-25 MG OR CAPS 1 CAPSULE DAILY   VIAGRA 100 MG OR TABS 1 TABLET BY MOUTH AS DIRECTED   VITAMIN E 800 IU OR CAPS 1 tablet daily   ZITHROMAX Z-CHETAN 250 MG OR CAPS 2 tablets day 1, 1 tablet days 2-5       PHYSICAL EXAMINATION:  Temp:  [97.3  F (36.3  C)-98.2  F (36.8  C)] 98.2  F (36.8  C)  Pulse:  [] 100  Heart Rate:  [] 99  Resp:  [10-30] 12  BP: ()/(28-82) 115/69  SpO2:  [90 %-100 %] 98 %  General: Alert, well-appearing, moderate distress.  HEENT: Normocephalic, atraumatic.  Chest wall: Symmetric thorax. No masses or tenderness to palpation.  Respiratory: Non-labored breathing. Lung sounds clear to auscultation bilaterally.  Cardiovascular: Regular rate and rhythm.  Gastrointestinal: Abdomen soft, non-distended.  Extremities: No limb deformities. No pedal edema.    LABS: Reviewed.   Arterial Blood Gases   No lab results found in last 7 days.  Complete Blood Count   Recent Labs   Lab 05/10/19  1146 05/10/19  1048 05/10/19  0958   WBC 30.6*  --  15.4*   HGB 12.4* 11.9* 14.3   *  --  108*     Basic Metabolic Panel  Recent Labs   Lab 05/10/19  1146 05/10/19  1048 05/10/19  0958    138 139   POTASSIUM 4.0 3.7 4.0   CHLORIDE 109 102 103   CO2 24  --  28   BUN 18 20 23   CR 0.89  --  0.99   * 253* 304*     Liver Function Tests  Recent Labs   Lab 05/10/19  1146 05/10/19  0958   INR 1.08 0.98     Pancreatic Enzymes  No lab results found in last 7 days.  Coagulation Profile  Recent Labs   Lab 05/10/19  1146 05/10/19  0958   INR 1.08 0.98   PTT  --  26     Lactate  Invalid input(s): LACTATE    IMAGING:  Results for  orders placed or performed during the hospital encounter of 05/10/19   Abd/pelvis CT,  IV  contrast only TRAUMA / AAA     Value    Radiologist flags Splenic rupture with active bleeding (AA)    Narrative    CT ABDOMEN AND PELVIS WITH CONTRAST   5/10/2019 10:24 AM     HISTORY:  Fall, left upper quadrant pain.    TECHNIQUE:  84 mL Isovue-370. Radiation dose for this scan was reduced  using automated exposure control, adjustment of the mA and/or kV  according to patient size, or iterative reconstruction technique.    COMPARISON: None.    FINDINGS:  There is splenic rupture with active bleeding. There is  moderate perisplenic hematoma and mild additional hemorrhage extending  down the left retroperitoneum. There is also mild hemoperitoneum. Mild  fatty liver infiltration. There is a fat-containing left inguinal  hernia. Colonic diverticulosis. Prostate enlargement. There is a focal  density at the posteromedial aspect of the right lower lobe which has  transverse dimensions of 3.7 x 1.3 cm. This could represent focal  pneumonia or rounded atelectasis, but a mass lesion cannot be  excluded. Bilateral calcified pleural plaques, with a pattern  suggesting previous asbestos exposure.     [Critical Result: Splenic rupture with active bleeding]    Finding was identified on 5/10/2019 10:29 AM.     Dr. Gill was contacted by me on 5/10/2019 10:30 AM and verbalized  understanding of the critical result.  Nothing else acute is seen in  the upper abdominal organs.       Impression    IMPRESSION:  1. Splenic rupture with active bleeding. Retroperitoneal hemorrhage  and mild hemoperitoneum.  2. 3.7 cm right lower lobe density, as above. Follow up is necessary.  Calcified pleural plaques presumably related to previous asbestos  exposure.  3. Additional findings discussed above.    Recommendations for an incidental lung nodule > 8mm:    Low risk patients: Consider follow-up CT in 3 months, PET/CT, and/or  tissue sampling.    High  risk patients: Same as for low risk patients.    *Low Risk: Minimal or absent history of smoking or other known risk  factors.  *Nonsolid (ground-glass) or partly solid nodules may require longer  follow-up to exclude indolent adenocarcinoma.  *Recommendations based on Guidelines for the Management of Incidental  Pulmonary Nodules Detected at CT: From the Fleischner Society 2017,  Radiology 2017.   POC US ABDOMEN LIMITED    Impression    [facility] Procedure Note      FAST (Focused Assessment with Sonography for Trauma):    PROCEDURE: PERFORMED BY: Dr. Jose Gill  INDICATIONS/SYMPTOM:  Abdominal Pain  PROBE: Low frequency convex probe and Cardiac phased array probe  BODY LOCATION: The ultrasound was performed in the abdominal and chest areas.  FINDINGS: Pericardial Effusion:  Negative  Hepatorenal Area:  Negative  Splenorenal Area:  Unable to visualize  Pelvic area:  Unable to visualize      INTERPRETATION: FAST exam revealed free fluid on suprapubic views. Unable to adequately visualize splenorenal views due to overlying rib shadow.  IMAGE DOCUMENTATION: Images were archived to PACs system.     XR Chest Port 1 View    Narrative    CHEST ONE VIEW PORTABLE  5/10/2019 10:40 AM     HISTORY:  Fall, left lower chest/left upper quadrant abdominal pain.    COMPARISON: None available.      Impression    IMPRESSION:    1. Calcified bilateral pleural plaques, which may well relate to  previous asbestos exposure.  2. Suboptimal inspiration with hypoventilatory changes.  3. No pneumothorax.    ARAMIS CUMMINS MD

## 2019-05-10 NOTE — PROGRESS NOTES
Admitted/transferred from: IR  Reason for admission/transfer: splenic lacand coil embolization  Patient status upon admission/transfer: stable  Interventions: settled from admission  Plan: per orders  2 RN skin assessment: completed by Wong MCCRARY and Divina GRIFFITH  Result of skin assessment and interventions/actions: Intact  Height, weight, drug calc weight: done  Patient belongings: underwear only at bedside, wife states that she has all other belongings  MDRO education (if applicable): yes

## 2019-05-10 NOTE — ED PROVIDER NOTES
[place] EMERGENCY DEPARTMENT ([facility])  May 10, 2019    History     Chief Complaint   Patient presents with     Trauma     HPI  Jayden Don is a 78 year old male with history notable for DM 2 and varicose veins of bilateral lower extremities with complications requiring future bilateral great saphenous vein radiofrequency ablation bilateral avulsions on 5/31/2019 [facility] who was transferred from [facility] to the [place] ED for further evaluation and treatment of spleen laceration secondary to mechanical fall.  Per chart review, at 7:30 AM this morning patient tripped fell, and landed on his left side.  Immediately afterward, patient started to have left-sided pleuritic and LUQ abdominal pain.  He did have chest x-ray, CT of the abdomen/pelvis with IV contrast, and point-of-care ultrasound of the abdomen done.  CT of the abdomen/pelvis showed splenic rupture with active bleeding, retroperitoneal hemorrhage, and mild hemoperitoneum, as well as calcified pleural plaques presumably related to previous asbestos exposure.  Chest x-ray was negative for pneumothorax.  POC ultrasound abdomen showed free fluid on suprapubic views.  Labs were notable for initial hemoglobin of 15.7 (from earlier today in clinic at [facility])  down to 11.9 and elevated WBC of 15.4.  Patient was given 1 unit of PRBC, 0.5 mg of IV Dilaudid , and 75 mcg of  IV fentanyl.     Per EMS, patient is detention through his blood transfusion, en route, he is complaining of 6/10 abdominal pain, so they gave him an additional 100 mcg of fentanyl good improvement of his pain down to 3/10.  Patient currently denies dizziness, lightheadedness, currently taking anticoagulants, or head injury.      XR Chest Portable 1 View:   1. Calcified bilateral pleural plaques, which may well relate to  previous asbestos exposure.  2. Suboptimal inspiration with hypoventilatory changes.  3. No  pneumothorax, as per radiology.      CT Abdomen/Pelvis with IV contrast only TRAUMA/AAA:   1. Splenic rupture with active bleeding. Retroperitoneal hemorrhage  and mild hemoperitoneum.  2. 3.7 cm right lower lobe density, as above. Follow up is necessary.  Calcified pleural plaques presumably related to previous asbestos  exposure.  3. Additional findings discussed above.    Recommendations for an incidental lung nodule > 8mm:    Low risk patients: Consider follow-up CT in 3 months, PET/CT, and/or  tissue sampling.    High risk patients: Same as for low risk patients.    *Low Risk: Minimal or absent history of smoking or other known risk  factors.  *Nonsolid (ground-glass) or partly solid nodules may require longer  follow-up to exclude indolent adenocarcinoma.  *Recommendations based on Guidelines for the Management of Incidental  Pulmonary Nodules Detected at CT: From the Fleischner Society 2017,  Radiology 2017, as per radiology.      POC US Abdomen, Limited:  FAST exam revealed free fluid on suprapubic views. Unable to adequately visualize splenorenal views due to overlying rib shadow.    PAST MEDICAL HISTORY  Past Medical History:   Diagnosis Date     Diabetes mellitus type 2, controlled, without complications (H)     controlled with diet and exercise      Macular degeneration of right eye     receives injections in right     Obstructive chronic bronchitis with exacerbation (H)      PAST SURGICAL HISTORY  No past surgical history on file.  FAMILY HISTORY  Family History   Problem Relation Age of Onset     C.A.D. Mother      Breast Cancer Mother         uterine      Genetic Disorder Mother         machular degeneration     Neurologic Disorder Father         parkinson     Eye Disorder Brother         photophobia     SOCIAL HISTORY  Social History     Tobacco Use     Smoking status: Never Smoker   Substance Use Topics     Alcohol use: Yes     Comment: moderate     MEDICATIONS  Current Facility-Administered  Medications   Medication     lactated ringers infusion     tranexamic acid (CYKLOKAPRON) 1 g in sodium chloride 0.9 % 50 mL bolus     ALLERGIES  Allergies   Allergen Reactions     Crestor [Rosuvastatin] Other (See Comments)     Joint Pain     No Known Drug Allergies        I have reviewed the Medications, Allergies, Past Medical and Surgical History, and Social History in the Epic system.    Review of Systems   Gastrointestinal: Positive for abdominal pain (LUQ).   Neurological: Negative for dizziness and light-headedness.   All other systems reviewed and are negative.      Physical Exam   BP: 120/70  Pulse: 100  Heart Rate: 97  Temp: 98  F (36.7  C)  Resp: 18  Weight: 77.1 kg (169 lb 14.4 oz)  SpO2: 97 %      Physical Exam   Constitutional: No distress.   HENT:   Head: Atraumatic.   Mouth/Throat: Oropharynx is clear and moist. No oropharyngeal exudate.   Eyes: Pupils are equal, round, and reactive to light. No scleral icterus.   Cardiovascular: Normal heart sounds.   Pulmonary/Chest: Breath sounds normal. No respiratory distress.   Left lower chest wall tenderness   Abdominal: Soft. He exhibits no distension.   Diffuse tenderness worse than left upper quadrant   Musculoskeletal: He exhibits no edema or tenderness.   Neurological: He is alert.   Skin: Skin is warm. He is not diaphoretic.   Psychiatric: He has a normal mood and affect. His behavior is normal.       ED Course        Procedures   11:30 AM  The patient was seen and examined by Dr. Molina in Room 2.                EKG Interpretation:      Interpreted by Isra Molina  Time reviewed: 1200  Symptoms at time of EKG: Trauma  Rhythm: normal sinus   Rate: normal  Axis: normal  Ectopy: none  Conduction: Prolonged QTC  ST Segments/ T Waves: No ST-T wave changes  Q Waves: none  Comparison to prior: No old EKG available    Clinical Impression: normal EKG          Critical Care time:  was 40 minutes for this patient excluding procedures.  Trauma:  Level of  trauma activation: Full  C-collar and immobilization: not indicated, cleared.  CSpine Clearance: C spine cleared clinically  GCS at arrival: 15  GCS at disposition: unchanged  Full Primary and Secondary survey with appropriate immobilization of spine completed in exam section.  Consults prior to admission or transfer: Interventional radiology  Procedures done in the ED: none     Labs Ordered and Resulted from Time of ED Arrival Up to the Time of Departure from the ED   CBC WITH PLATELETS - Abnormal; Notable for the following components:       Result Value    WBC 30.6 (*)     RBC Count 4.05 (*)     Hemoglobin 12.4 (*)     Hematocrit 36.8 (*)     Platelet Count 104 (*)     All other components within normal limits   INR   ABO/RH TYPE AND SCREEN     Results for orders placed or performed during the hospital encounter of 05/10/19   Basic metabolic panel   Result Value Ref Range    Sodium 140 133 - 144 mmol/L    Potassium 4.0 3.4 - 5.3 mmol/L    Chloride 109 94 - 109 mmol/L    Carbon Dioxide 24 20 - 32 mmol/L    Anion Gap 7 3 - 14 mmol/L    Glucose 230 (H) 70 - 99 mg/dL    Urea Nitrogen 18 7 - 30 mg/dL    Creatinine 0.89 0.66 - 1.25 mg/dL    GFR Estimate 83 >60 mL/min/[1.73_m2]    GFR Estimate If Black >90 >60 mL/min/[1.73_m2]    Calcium 7.8 (L) 8.5 - 10.1 mg/dL   CBC with platelets   Result Value Ref Range    WBC 30.6 (H) 4.0 - 11.0 10e9/L    RBC Count 4.05 (L) 4.4 - 5.9 10e12/L    Hemoglobin 12.4 (L) 13.3 - 17.7 g/dL    Hematocrit 36.8 (L) 40.0 - 53.0 %    MCV 91 78 - 100 fl    MCH 30.6 26.5 - 33.0 pg    MCHC 33.7 31.5 - 36.5 g/dL    RDW 12.9 10.0 - 15.0 %    Platelet Count 104 (L) 150 - 450 10e9/L   INR   Result Value Ref Range    INR 1.08 0.86 - 1.14   EKG 12-lead, complete   Result Value Ref Range    Interpretation ECG Click View Image link to view waveform and result    ABO/Rh type and screen   Result Value Ref Range    ABO PENDING     Antibody Screen PENDING     Test Valid Only At          [facility]  [facility]    Specimen Expires 05/13/2019             Assessments & Plan (with Medical Decision Making)   78-year-old male presents to us from an outside hospital with a chief complaint of splenic laceration after fall.  Patient had been hypotensive prior to arrival and so blood was started.  Full red trauma activation upon patient's arrival.  Dr. Moody trauma surgeon was present when the patient arrived.  Patient had a positive FAST exam with some free fluid in the left upper quadrant.  Patient's pressure was stable upon arrival and would continue to infuse blood.  TXA was ordered.  Scans were reviewed and IR was consulted.  They will take the patient to IR for splenic embolization.  No other traumatic injuries were identified.  Patient will be admitted to trauma service under Dr. Latif to the ICU postprocedure.    I have reviewed the nursing notes.    I have reviewed the findings, diagnosis, plan and need for follow up with the patient.       Medication List      There are no discharge medications for this visit.         Final diagnoses:   Laceration of spleen, initial encounter     IMoose, am serving as a trained medical scribe to document services personally performed by  Isra Molina DO, based on the provider's statements to me.      Isra BENNETT DO, was physically present and have reviewed and verified the accuracy of this note documented by Moose Galvan.     5/10/2019   [facility], EMERGENCY DEPARTMENT     Isra Molina DO  05/10/19 [id number]

## 2019-05-10 NOTE — CONSULTS
General Surgery Consult for full trauma activation     Time of page: 10:23am  Time present for consultation in the ED: 10:27am     HPI:  Jayden Don is a 78 year old male who presents to the [facility] ED today after fall from standing on concrete. Reports this happened around 7:30am. Tripped while walking outside and fell onto his left side/back. Denies hitting his head or LOC.  Wife brought him the ED due to ongoing abdominal pain. Last meal was at 6:30am    The patient complains of pain: entire abdomen, mainly his left side and his left back. Denies extremity pain, headache, neck pain.     FAST scan:  Positive in pelvis per report by ED physician    The patient has been overall hemodynamically stable however after a dose of dilaudid dropped his blood pressure to 60s briefly. That came back up with saline bolus.     Wife states pt has high cholesterol  Only previous surgery was nasal surgery, no abdominal surgeries     Imaging:  Yes. Details: CT abd/pelv shows significant splenic laceration with active blush within the spleen, free fluid/blood within the LUQ, RUQ     Lab: Yes. Details: initial Hb 14    BP (!) 86/57   Pulse 83   Temp 97.3  F (36.3  C) (Temporal)   Resp 19   Wt 75.8 kg (167 lb)   SpO2 94%   BMI 29.58 kg/m     BP 160s/60 and  when I examined the patient  Primary survey revealed patent airway and breathing with appropriate saturations, strong distal pulses. No obvious external injury and GCS of 15    General appearance: laying on bed, appears slightly distressed due to pain  Head: Head is normocephalic and atraumatic.  Eyes: Pupils are equal and round and reactive to light. Eyes atraumatic. EOM full, no proptosis, no conjunctival injection  ENT: External ears normal. Nose without injury. Lips, tongue and oral mucosa without lacerations. No malocclusion. Trachea midline, no neck swelling or masses noted.   Neck: No midline tenderness  Chest:  Breathing comfortably with equal chest rise on  room air. Heart with tachycardic to 100.  No palpable swelling, masses, ecchymosis, no crepitus, no visible deformity.  Abdomen:  Appears distended, tender more in left upper quadrant.  Back:  no abrasions, overlying skin changes  Extremities: Bilateral upper extremities normal without injury, Full ROM of all major joints   Bilateral lower extremities: Full ROM of all major joints. Left knee anterior ecchymosis but no joint swelling.  Neurologic: nonfocal, grossly intact times four extremities with normal strength, alert and oriented times three.  Cranial nerves II through XII intact grossly.  Psychiatric: mood and affect are appropriate.  Skin: Abrasions  as noted above.  Ecchymosis  as noted above.     A/P:  Jayden Don is a 78 year old male who is now hemodynamically stable who sustained splenic laceration with active bleeding after a fall from standing on concrete 3 hours prior.  No evidence of other injuries at this time. Temporary hypotension after dilaudid, responded well to fluids.  Given his hemodynamic stability, he does not currently require emergent surgery and discussed with his wife he will transfer to the [facility] for evaluation of possible embolization or surgery.    I have discussed this case with Dr. Jose Gill, ED physician. Plan to transfer the patient to a trauma center for further care.     Jennifer Bauman MD

## 2019-05-10 NOTE — PROGRESS NOTES
Patient is on IR schedule 5/10/2019 for a splenic embolization.   Labs WNL for procedure.   Orders for NPO, scrubs and antibiotics have been entered.  Consent will be done prior to procedure.   splenic lac after fall from standing, hypotensive, getting fluids and blood products  Please contact the IR charge RN at [phone] for estimated time of procedure.       Gem Toledo IR RPA  [phone] Call pager  [phone] pager

## 2019-05-10 NOTE — PLAN OF CARE
RICCI: Pt admitted from IR s/p splenic lac with coil embolization. Pt VSS and minimal c/o pain that was relieved by Tylenol and dilaudid. Remains on BR till almost 9pm. Family updated and they went home.   P: Continue to closely monitor for bleeding and if any changes noted will notify MD's as necessary. See flowsheets for more details.

## 2019-05-11 ENCOUNTER — APPOINTMENT (OUTPATIENT)
Dept: GENERAL RADIOLOGY | Facility: CLINIC | Age: 79
DRG: 982 | End: 2019-05-11
Payer: COMMERCIAL

## 2019-05-11 ENCOUNTER — APPOINTMENT (OUTPATIENT)
Dept: PHYSICAL THERAPY | Facility: CLINIC | Age: 79
DRG: 982 | End: 2019-05-11
Payer: COMMERCIAL

## 2019-05-11 LAB
ABO + RH BLD: NORMAL
ABO + RH BLD: NORMAL
ANION GAP SERPL CALCULATED.3IONS-SCNC: 7 MMOL/L (ref 3–14)
BLD GP AB SCN SERPL QL: NORMAL
BLD PROD TYP BPU: NORMAL
BLOOD BANK CMNT PATIENT-IMP: NORMAL
BUN SERPL-MCNC: 24 MG/DL (ref 7–30)
CALCIUM SERPL-MCNC: 8.3 MG/DL (ref 8.5–10.1)
CHLORIDE SERPL-SCNC: 107 MMOL/L (ref 94–109)
CO2 SERPL-SCNC: 25 MMOL/L (ref 20–32)
CREAT SERPL-MCNC: 1.07 MG/DL (ref 0.66–1.25)
ERYTHROCYTE [DISTWIDTH] IN BLOOD BY AUTOMATED COUNT: 13.4 % (ref 10–15)
GFR SERPL CREATININE-BSD FRML MDRD: 67 ML/MIN/{1.73_M2}
GLUCOSE BLDC GLUCOMTR-MCNC: 147 MG/DL (ref 70–99)
GLUCOSE BLDC GLUCOMTR-MCNC: 159 MG/DL (ref 70–99)
GLUCOSE BLDC GLUCOMTR-MCNC: 161 MG/DL (ref 70–99)
GLUCOSE BLDC GLUCOMTR-MCNC: 162 MG/DL (ref 70–99)
GLUCOSE BLDC GLUCOMTR-MCNC: 174 MG/DL (ref 70–99)
GLUCOSE BLDC GLUCOMTR-MCNC: 181 MG/DL (ref 70–99)
GLUCOSE SERPL-MCNC: 170 MG/DL (ref 70–99)
HBA1C MFR BLD: 7.6 % (ref 0–5.6)
HCT VFR BLD AUTO: 33.6 % (ref 40–53)
HGB BLD-MCNC: 11 G/DL (ref 13.3–17.7)
HGB BLD-MCNC: 11.1 G/DL (ref 13.3–17.7)
LACTATE BLD-SCNC: 1.6 MMOL/L (ref 0.7–2)
LACTATE BLD-SCNC: 1.9 MMOL/L (ref 0.7–2)
MCH RBC QN AUTO: 30.6 PG (ref 26.5–33)
MCHC RBC AUTO-ENTMCNC: 32.7 G/DL (ref 31.5–36.5)
MCV RBC AUTO: 94 FL (ref 78–100)
NUM BPU REQUESTED: 1
PLATELET # BLD AUTO: 72 10E9/L (ref 150–450)
POTASSIUM SERPL-SCNC: 4.2 MMOL/L (ref 3.4–5.3)
RADIOLOGIST FLAGS: ABNORMAL
RBC # BLD AUTO: 3.59 10E12/L (ref 4.4–5.9)
SODIUM SERPL-SCNC: 139 MMOL/L (ref 133–144)
SPECIMEN EXP DATE BLD: NORMAL
WBC # BLD AUTO: 13.8 10E9/L (ref 4–11)

## 2019-05-11 PROCEDURE — 97116 GAIT TRAINING THERAPY: CPT | Mod: GP | Performed by: PHYSICAL THERAPIST

## 2019-05-11 PROCEDURE — 36415 COLL VENOUS BLD VENIPUNCTURE: CPT

## 2019-05-11 PROCEDURE — 85018 HEMOGLOBIN: CPT

## 2019-05-11 PROCEDURE — 00000146 ZZHCL STATISTIC GLUCOSE BY METER IP

## 2019-05-11 PROCEDURE — 36415 COLL VENOUS BLD VENIPUNCTURE: CPT | Performed by: SURGERY

## 2019-05-11 PROCEDURE — 99232 SBSQ HOSP IP/OBS MODERATE 35: CPT | Performed by: NURSE PRACTITIONER

## 2019-05-11 PROCEDURE — 83605 ASSAY OF LACTIC ACID: CPT | Performed by: SURGERY

## 2019-05-11 PROCEDURE — 85027 COMPLETE CBC AUTOMATED: CPT

## 2019-05-11 PROCEDURE — 97530 THERAPEUTIC ACTIVITIES: CPT | Mod: GP | Performed by: PHYSICAL THERAPIST

## 2019-05-11 PROCEDURE — 25000132 ZZH RX MED GY IP 250 OP 250 PS 637

## 2019-05-11 PROCEDURE — 99233 SBSQ HOSP IP/OBS HIGH 50: CPT | Mod: GC | Performed by: ANESTHESIOLOGY

## 2019-05-11 PROCEDURE — 83605 ASSAY OF LACTIC ACID: CPT

## 2019-05-11 PROCEDURE — 12000001 ZZH R&B MED SURG/OB UMMC

## 2019-05-11 PROCEDURE — 80048 BASIC METABOLIC PNL TOTAL CA: CPT

## 2019-05-11 PROCEDURE — 25000132 ZZH RX MED GY IP 250 OP 250 PS 637: Performed by: STUDENT IN AN ORGANIZED HEALTH CARE EDUCATION/TRAINING PROGRAM

## 2019-05-11 PROCEDURE — 40000894 ZZH STATISTIC OT IP EVAL DEFER: Performed by: OCCUPATIONAL THERAPIST

## 2019-05-11 PROCEDURE — 83036 HEMOGLOBIN GLYCOSYLATED A1C: CPT

## 2019-05-11 PROCEDURE — 25000132 ZZH RX MED GY IP 250 OP 250 PS 637: Performed by: NURSE PRACTITIONER

## 2019-05-11 PROCEDURE — 74018 RADEX ABDOMEN 1 VIEW: CPT

## 2019-05-11 PROCEDURE — 25800030 ZZH RX IP 258 OP 636

## 2019-05-11 PROCEDURE — 97161 PT EVAL LOW COMPLEX 20 MIN: CPT | Mod: GP | Performed by: PHYSICAL THERAPIST

## 2019-05-11 RX ORDER — TAMSULOSIN HYDROCHLORIDE 0.4 MG/1
0.4 CAPSULE ORAL AT BEDTIME
Status: DISCONTINUED | OUTPATIENT
Start: 2019-05-11 | End: 2019-05-16 | Stop reason: HOSPADM

## 2019-05-11 RX ADMIN — MAGNESIUM HYDROXIDE 30 ML: 400 SUSPENSION ORAL at 23:02

## 2019-05-11 RX ADMIN — SENNOSIDES AND DOCUSATE SODIUM 2 TABLET: 8.6; 5 TABLET ORAL at 20:19

## 2019-05-11 RX ADMIN — ACETAMINOPHEN 975 MG: 325 TABLET, FILM COATED ORAL at 16:24

## 2019-05-11 RX ADMIN — METHOCARBAMOL 500 MG: 500 TABLET, FILM COATED ORAL at 09:27

## 2019-05-11 RX ADMIN — TAMSULOSIN HYDROCHLORIDE 0.4 MG: 0.4 CAPSULE ORAL at 22:35

## 2019-05-11 RX ADMIN — POLYETHYLENE GLYCOL 3350 17 G: 17 POWDER, FOR SOLUTION ORAL at 08:30

## 2019-05-11 RX ADMIN — INSULIN ASPART 1 UNITS: 100 INJECTION, SOLUTION INTRAVENOUS; SUBCUTANEOUS at 00:19

## 2019-05-11 RX ADMIN — SODIUM CHLORIDE, POTASSIUM CHLORIDE, SODIUM LACTATE AND CALCIUM CHLORIDE: 600; 310; 30; 20 INJECTION, SOLUTION INTRAVENOUS at 06:10

## 2019-05-11 RX ADMIN — ACETAMINOPHEN 975 MG: 325 TABLET, FILM COATED ORAL at 23:40

## 2019-05-11 RX ADMIN — ACETAMINOPHEN 975 MG: 325 TABLET, FILM COATED ORAL at 00:12

## 2019-05-11 RX ADMIN — METHOCARBAMOL 500 MG: 500 TABLET, FILM COATED ORAL at 04:00

## 2019-05-11 RX ADMIN — INSULIN ASPART 1 UNITS: 100 INJECTION, SOLUTION INTRAVENOUS; SUBCUTANEOUS at 08:44

## 2019-05-11 RX ADMIN — METHOCARBAMOL 500 MG: 500 TABLET, FILM COATED ORAL at 16:24

## 2019-05-11 RX ADMIN — INSULIN ASPART 1 UNITS: 100 INJECTION, SOLUTION INTRAVENOUS; SUBCUTANEOUS at 04:01

## 2019-05-11 RX ADMIN — METHOCARBAMOL 500 MG: 500 TABLET, FILM COATED ORAL at 22:35

## 2019-05-11 RX ADMIN — ACETAMINOPHEN 975 MG: 325 TABLET, FILM COATED ORAL at 08:30

## 2019-05-11 RX ADMIN — SIMVASTATIN 20 MG: 10 TABLET, FILM COATED ORAL at 20:19

## 2019-05-11 RX ADMIN — SENNOSIDES AND DOCUSATE SODIUM 2 TABLET: 8.6; 5 TABLET ORAL at 08:30

## 2019-05-11 ASSESSMENT — ACTIVITIES OF DAILY LIVING (ADL)
ADLS_ACUITY_SCORE: 11

## 2019-05-11 ASSESSMENT — PAIN DESCRIPTION - DESCRIPTORS: DESCRIPTORS: ACHING

## 2019-05-11 NOTE — PROGRESS NOTES
SURGICAL ICU PROGRESS NOTE  May 11, 2019        Date of Service (when I saw the patient): 05/11/2019    ASSESSMENT: Jayden Don is a 78 year old male pmhx of DMII and HTN s/p ground level this am with splenic laceration s/p embolization with IR 5/10/19.     Changes Today:  ADAT this AM  Discontinue IVF  Hgb this AM  Transfer to trauma    PLAN:  Neuro/ pain/ sedation:  - Monitor neurological status; notify the MD for any acute changes in exam  - tylenol, lidocaine, robaxin for pain.  - dilaudid and oxycodone prn for pain     Pulmonary care:   - Supplemental oxygen to keep saturation above 92%  - Incentive spirometer [id number] when awake  - Room air     Cardiovascular:    #HTN  - Monitor hemodynamic status  - holding pta triamterene-hydrochlorothiazide     GI care:   #splenic laceration  - s/p emobolization w/ IR 5/10/19.  - advance diet as tolerated  - senna, miralax     Fluids/ Electrolytes/ Nutrition:   - No indication for parenteral nutrition    Renal/ Fluid Balance:   #DM   - uop adequate   - Will continue to monitor intake and output  - if not making spontaneous urine on floor. Will consider catheter  - restarted PTA tamsulosin 0.4 mg     Endocrine:    - Sliding scale for diabetes management      ID/ Antibiotics:  - No indication for antibiotics     Heme:  #splenic laceration  -hgb 11.1 this am, likely dilutional drop, repeat hgb 11     - Hemoglobin stable at this time     MSK:  Up with assist     Prophylaxis:    - Mechanical prophylaxis for DVT     Lines/ tubes/ drains:  -PIV x2     Disposition:  - Surgical ICU     Patient seen, findings and plan discussed with surgical ICU staff Dr. Wyman.    Mehdi Orta    ====================================  INTERVAL HISTORY:   Doing well this am. Pain only at left sided back, midthoracic.     OBJECTIVE:   1. VITAL SIGNS:   Temp:  [97.3  F (36.3  C)-99.5  F (37.5  C)] 98.8  F (37.1  C)  Pulse:  [] 94  Heart Rate:  [] 88  Resp:  [10-30] 16  BP:  ()/() 138/79  SpO2:  [90 %-100 %] 97 %  Resp: 16      2. INTAKE/ OUTPUT:   I/O last 3 completed shifts:  In: 1765 [P.O.:25; I.V.:1740]  Out: 500 [Urine:500]    3. PHYSICAL EXAMINATION:  General: NAD, laying in bed this am  HEENT: no acute trauma noted  Neuro: A&Ox3  Pulm/Resp: Clear breath sounds bilaterally no obvious wheezing  CV: RRR no obvious murmurs  Abdomen: Soft, non-distended, non-tender, no rebound tenderness or guarding, no masses  MSK/Extremities: no peripheral edema, moving all extremities,  extremities well perfused    4. INVESTIGATIONS:   Arterial Blood Gases   No lab results found in last 7 days.  Complete Blood Count   Recent Labs   Lab 05/11/19  0348 05/10/19  2030 05/10/19  1619 05/10/19  1148 05/10/19  1146  05/10/19  0958   WBC 13.8*  --  18.6*  --  30.6*  --  15.4*   HGB 11.0* 12.4* 12.8* 11.6* 12.4*   < > 14.3   PLT 72*  --  94*  --  104*  --  108*    < > = values in this interval not displayed.     Basic Metabolic Panel  Recent Labs   Lab 05/11/19  0348 05/10/19  1148 05/10/19  1146 05/10/19  1048 05/10/19  0958    141 140 138 139   POTASSIUM 4.2 4.0 4.0 3.7 4.0   CHLORIDE 107  --  109 102 103   CO2 25  --  24  --  28   BUN 24  --  18 20 23   CR 1.07  --  0.89  --  0.99   * 231* 230* 253* 304*     Liver Function Tests  Recent Labs   Lab 05/10/19  1619 05/10/19  1146 05/10/19  0958   INR 1.08 1.08 0.98     Pancreatic Enzymes  No lab results found in last 7 days.  Coagulation Profile  Recent Labs   Lab 05/10/19  1619 05/10/19  1146 05/10/19  0958   INR 1.08 1.08 0.98   PTT  --   --  26         5. RADIOLOGY:   Recent Results (from the past 24 hour(s))   POC US ABDOMEN LIMITED    Impression    [facility] Procedure Note      FAST (Focused Assessment with Sonography for Trauma):    PROCEDURE: PERFORMED BY: Dr. Jose Gill  INDICATIONS/SYMPTOM:  Abdominal Pain  PROBE: Low frequency convex probe and Cardiac phased array probe  BODY LOCATION: The ultrasound was  performed in the abdominal and chest areas.  FINDINGS: Pericardial Effusion:  Negative  Hepatorenal Area:  Negative  Splenorenal Area:  Unable to visualize  Pelvic area:  Unable to visualize      INTERPRETATION: FAST exam revealed free fluid on suprapubic views. Unable to adequately visualize splenorenal views due to overlying rib shadow.  IMAGE DOCUMENTATION: Images were archived to PACs system.     Abd/pelvis CT,  IV  contrast only TRAUMA / AAA   Result Value    Radiologist flags Splenic rupture with active bleeding (AA)    Narrative    CT ABDOMEN AND PELVIS WITH CONTRAST   5/10/2019 10:24 AM     HISTORY:  Fall, left upper quadrant pain.    TECHNIQUE:  84 mL Isovue-370. Radiation dose for this scan was reduced  using automated exposure control, adjustment of the mA and/or kV  according to patient size, or iterative reconstruction technique.    COMPARISON: None.    FINDINGS:  There is splenic rupture with active bleeding. There is  moderate perisplenic hematoma and mild additional hemorrhage extending  down the left retroperitoneum. There is also mild hemoperitoneum. Mild  fatty liver infiltration. There is a fat-containing left inguinal  hernia. Colonic diverticulosis. Prostate enlargement. There is a focal  density at the posteromedial aspect of the right lower lobe which has  transverse dimensions of 3.7 x 1.3 cm. This could represent focal  pneumonia or rounded atelectasis, but a mass lesion cannot be  excluded. Bilateral calcified pleural plaques, with a pattern  suggesting previous asbestos exposure.     [Critical Result: Splenic rupture with active bleeding]    Finding was identified on 5/10/2019 10:29 AM.     Dr. Gill was contacted by me on 5/10/2019 10:30 AM and verbalized  understanding of the critical result.  Nothing else acute is seen in  the upper abdominal organs.       Impression    IMPRESSION:  1. Splenic rupture with active bleeding. Retroperitoneal hemorrhage  and mild hemoperitoneum.  2. 3.7  cm right lower lobe density, as above. If indicated clinically,  a complete chest CT could be performed. Follow up is necessary, as  below. Calcified pleural plaques presumably related to previous  asbestos exposure.  3. Additional findings discussed above.    Recommendations for an incidental lung nodule > 8mm:    Low risk patients: Consider follow-up CT in 3 months, PET/CT, and/or  tissue sampling.    High risk patients: Same as for low risk patients.    *Low Risk: Minimal or absent history of smoking or other known risk  factors.  *Nonsolid (ground-glass) or partly solid nodules may require longer  follow-up to exclude indolent adenocarcinoma.  *Recommendations based on Guidelines for the Management of Incidental  Pulmonary Nodules Detected at CT: From the Fleischner Society 2017,  Radiology 2017.   XR Chest Port 1 View    Narrative    CHEST ONE VIEW PORTABLE  5/10/2019 10:40 AM     HISTORY:  Fall, left lower chest/left upper quadrant abdominal pain.    COMPARISON: None available.      Impression    IMPRESSION:    1. Calcified bilateral pleural plaques, which may well relate to  previous asbestos exposure.  2. Suboptimal inspiration with hypoventilatory changes.  3. No pneumothorax.    ARAMIS CUMMINS MD   IR Visceral Angiogram    Narrative    Procedures 5/10/2019:  1. Ultrasound guidance for needle access.  2. Celiac and splenic artery catheterizations and angiograms.  3. Splenic artery segmental artery catheterization, angiogram with  particle and coil embolization.  4. Minx device deployment for right common femoral arteriotomy  closure.    History: Splenic laceration with active hemorrhage.    Comparison: CT 5/10/2018    Staff: Luis Angel Benson M.D.    Fellow: Riley Rubio M.D.    I, LUIS ANGEL BENSON MD, attest that I was present in the procedure room for  the entire procedure.    Medications:   1. Fentanyl 100 mcg IV  2. Versed 1 mg IV   3. 30 mg Toradol  4. 8 cc 1% lidocaine.    Moderate sedation administered by the IR  nurse at the supervision of  the attending. Vital signs and oxygenation continuously monitored. The  patient remained stable throughout the procedure.    Attending face-to-face time: 100 minutes of direct face-to-face  evaluation    Fluoroscopy time: 20.5 minutes.    Fluoroscopy dose (air kerma): 2607 mGy    Contrast: 90 ml Visipaque 320    Findings/procedure:    Prior to the procedure, both verbal and written informed consent  obtained from the patient. Patient placed in the supine position on  the interventional table. The right inguinal region was prepped and  draped in the usual sterile fashion. Timeout was performed.    Fluoroscopy was used to glenda the right femoral head. Ultrasound used  to identify a patent right common femoral artery. 1% lidocaine was  used for local anesthesia. Under direct ultrasound guidance a 21-gauge  micropuncture needle was used to access the patent right common  femoral artery.    Ultrasound image documenting right common femoral arterial patency and  needle arteriotomy was saved in the patient's record.    Needle was exchanged over an 0.018 wire for a micropuncture sheath and  ultimately an 0.035 wire and a 5 [employer]is Brite Tip sheath.    Celiac artery was catheterized with a C2 glide catheter and a 0.035  angled Glidewire. Celiac artery angiogram was performed. This  demonstrated conventional celiac artery anatomy and puddling of  contrast in the region of the splenic parenchyma.    The catheter was advanced to the mid splenic artery. Focal dissection  was noted with a small amount of extravasation. Base catheter could  not be advanced beyond this point. Progreat microcatheter and wire  were advanced to the more distal splenic artery and angiograms were  performed in multiple obliquities. This demonstrated at least 2 areas  of extravasation. Very distal catheterization was not technically  feasible. Both a 018 V18  wire and a 014 Synchro 2 wires were used in  an attempt to  exchange the base catheter were advanced the  microcatheter which were unsuccessful. Progreat microwire and catheter  were then placed in the segmental branch of the splenic artery.  Particle embolization was performed using a Gelfoam slurry. This was  performed to 4 beat stasis with notable pruning of the splenic  arterial tree. Angiogram through the base catheter demonstrated region  of vessel irregularity and contrast puddling. 3 3/2 Tornado coils were  placed in the region of this vessel irregularity contrast puddling.  Angiogram performed through the base catheter. No extravasation was  visualized.    Catheters were removed. Limited right iliofemoral angiography was  performed demonstrating adequate puncture site for closure device  deployment and no immediate consultation.    MYNX-  closure device was deployed in comminution manual  compression. A small hematoma developed and an additional 10 minutes  of manual compression were applied and hemostasis obtained. Hematoma  compressed away.    The patient tolerated the procedure well. No immediate complication.      Impression    Impression:  1. Splenic artery catheterization and angiogram demonstrating active  extravasation. Active extravasation resolved status post particle and  coil embolization.    2. Small iatrogenic splenic artery dissection which was not  flow-limiting. This will likely be self-limited and of no clinical  consequence.    Plan:   - Postoperative care as ordered with 6 hours of right straight leg  bedrest.    - CT angiogram of the abdomen in days to weeks to evaluate for  residual pseudoaneurysms.    I have personally reviewed the examination and initial interpretation  and I agree with the findings.    FREDI BENSON MD       =========================================

## 2019-05-11 NOTE — PLAN OF CARE
Neuro- A/Ox4, afebrile. C/o pain to abdomen on L side, managed with PRN oxy x1  and scheduled tylenol. Up with SBA.   Pulm- Sating 95% on RA. Lung sounds clear.   CV- HR in the 80's, sinus rhythm, no ectopy. BP stable.   GI/- Unable to void- bladder scanned for 271 and 325, straight cath this AM for 500mL. No BM, passing gas per pt. Distended abdomen-remains unchanged.   Labs- hgb 11, Plt 72.     Plan- Follow POC and update SICU with changes/concerns.

## 2019-05-11 NOTE — PROGRESS NOTES
"SPIRITUAL HEALTH SERVICES  SPIRITUAL ASSESSMENT Progress Note  [facility] ([place]) 7B     REFERRAL SOURCE: Request on admission.    Visited pt and his wife, oriented them to [facility] and had a short reflective conversation. Pt said that he is feeling much better and \"God willing I will be home tomorrow.\" He said that he is planning for go for a walk now and thanked the [facility] for stopping by.     PLAN: No follow up planned. [facility] remains available if the need arise.    Imelda Grey  Chaplain Resident  Pager  [phone]    "

## 2019-05-11 NOTE — PLAN OF CARE
Patient arrived safely from ICU A&O, pleasant, up SBA from wheel chair to chair, reporting managed pain. Patient tolerated regular diet for lunch,  covered via S.S. Patient working with therapy this afternoon- ambulating hallways outside room at this time. Patient due to void.

## 2019-05-11 NOTE — PROGRESS NOTES
05/11/19 1324   Quick Adds   Type of Visit Initial PT Evaluation   Living Environment   Lives With spouse   Living Arrangements house   Home Accessibility stairs to enter home;stairs within home   Number of Stairs, Main Entrance 1   Stair Railings, Main Entrance none   Number of Stairs, Within Home, Primary 10   Stair Railings, Within Home, Primary railing on right side (ascending)   Transportation Anticipated family or friend will provide   Living Environment Comment Pt lives with his wife, bed/bath on second floor, walk in shower    Self-Care   Usual Activity Tolerance good   Current Activity Tolerance good   Regular Exercise Yes   Activity/Exercise Type walking   Exercise Amount/Frequency daily   Equipment Currently Used at Home none   Activity/Exercise/Self-Care Comment pt active walker at baseline, recently has been having issues with R knee immobility due to varicose vein- getting it removed, currently getting work up at [facility]   Functional Level Prior   Ambulation 0-->independent   Transferring 0-->independent   Toileting 0-->independent   Bathing 0-->independent   Communication 0-->understands/communicates without difficulty   Swallowing 0-->swallows foods/liquids without difficulty   Cognition 0 - no cognition issues reported   Fall history within last six months yes   Number of times patient has fallen within last six months 1   Which of the above functional risks had a recent onset or change? fall history   Prior Functional Level Comment pt admitted s/p fall, reports taking a step back off a ledge accidentally outside [facility] due to construction    General Information   Onset of Illness/Injury or Date of Surgery - Date 05/10/19   Referring Physician Mehdi Orta MD   Patient/Family Goals Statement to go home   Pertinent History of Current Problem (include personal factors and/or comorbidities that impact the POC) 78 year old male pmhx of DMII and HTN s/p ground level this am with splenic  laceration s/p embolization with IR 5/10/19.   Precautions/Limitations fall precautions  (R groin incision )   Heart Disease Risk Factors High blood pressure;Diabetes   General Observations pt up ad laith in room, alert, pleasant   General Info Comments activity orders: up with assist    Cognitive Status Examination   Orientation orientation to person, place and time   Level of Consciousness alert   Follows Commands and Answers Questions 100% of the time   Personal Safety and Judgment intact   Memory intact   Pain Assessment   Patient Currently in Pain Yes, see Vital Sign flowsheet   Integumentary/Edema   Integumentary/Edema Comments R groin incision    Posture    Posture Not impaired   Range of Motion (ROM)   ROM Quick Adds No deficits were identified   Strength   Manual Muscle Testing Quick Adds No deficits were identified   Bed Mobility   Bed Mobility Comments not assessed, pt up in chair    Transfer Skills   Transfer Comments IND   Gait   Gait Comments SBA-IND   Balance   Balance Comments mild lateral instability, no LOB   Sensory Examination   Sensory Perception no deficits were identified   General Therapy Interventions   Planned Therapy Interventions progressive activity/exercise;home program guidelines;risk factor education   Clinical Impression   Criteria for Skilled Therapeutic Intervention yes, treatment indicated   PT Diagnosis at risk for deconditioning    Influenced by the following impairments SOB, at risk for deconditioning    Functional limitations due to impairments impaired activity tolerance    Clinical Presentation Stable/Uncomplicated   Clinical Presentation Rationale pt with clear presentation    Clinical Decision Making (Complexity) Low complexity   Therapy Frequency` daily   Predicted Duration of Therapy Intervention (days/wks) PT one time    Anticipated Discharge Disposition Home with Assist   Risk & Benefits of therapy have been explained Yes   Patient, Family & other staff in agreement with  "plan of care Yes   Clinical Impression Comments Pt moving well, at his baseline, some SOB but SpO2 >90%   [facility] AM-PAC TM \"6 Clicks\"   2016, Trustees of [facility], under license to [employer].  All rights reserved.   6 Clicks Short Forms Basic Mobility Inpatient Short Form   [facility] AM-PAC  \"6 Clicks\" V.2 Basic Mobility Inpatient Short Form   1. Turning from your back to your side while in a flat bed without using bedrails? 4 - None   2. Moving from lying on your back to sitting on the side of a flat bed without using bedrails? 4 - None   3. Moving to and from a bed to a chair (including a wheelchair)? 4 - None   4. Standing up from a chair using your arms (e.g., wheelchair, or bedside chair)? 4 - None   5. To walk in hospital room? 4 - None   6. Climbing 3-5 steps with a railing? 4 - None   Basic Mobility Raw Score (Score out of 24.Lower scores equate to lower levels of function) 24   Total Evaluation Time   Total Evaluation Time (Minutes) 5     "

## 2019-05-11 NOTE — PROGRESS NOTES
Interventional Radiology Progress Note     May 11, 2019    Subjective: Jayden Don is a 78-year-old male postop day 1 status post splenic artery embolization for active extravasation status post splenic laceration from a fall.  He reports doing well this morning.  He reports some left lower quadrant abdominal pain which is only elicited when he presses on it.  He does also report some abdominal distention.  He reports otherwise feeling well.  He is eating and drinking normally.  He has some urinary retention requiring straight catheterization but is not taking his home tamsulosin.  He has not yet had a stool.    Objective: /72   Pulse 94   Temp 98.8  F (37.1  C) (Oral)   Resp 22   Wt 77.1 kg (169 lb 14.4 oz)   SpO2 98%   BMI 30.10 kg/m      Last hemoglobins stable at approximately 11, 11.1.      Intake/Output Summary (Last 24 hours) at 5/11/2019 1142  Last data filed at 5/11/2019 1100  Gross per 24 hour   Intake 2145 ml   Output 500 ml   Net 1645 ml       Imaging: No new imaging    Physical Exam:  Gen: Alert and oriented x4.  No apparent distress.  Sitting in a chair.  CV: Mildly tachycardic with heart rates in the 90s.  Clear to auscultation no murmurs rubs or gallops.  Chest: Clear to auscultation bilaterally.  Abdomen: Soft.  Mildly distended.  Minimal left lower quadrant tenderness.  Extremities: Small amount of bruising in the right upper thigh.  Puncture site is soft and nontender.  Palpable right lower extremity pulses.  Neuro: Grossly intact    Assessment/Plan: 78 year old yo male postop day 1 status post splenic artery embolization.  He is doing well.  No significant signs of active bleeding although he is mildly tachycardic.  CT angiogram of the abdomen should be obtained in 1 to 2 weeks as an outpatient to evaluate for residual pseudoaneurysms.  If his hemoglobin trends downward a CT angiogram that is his anxiolytic and is okay having radiology  Failure.  Denies should be obtained as an  inpatient.  If he demonstrates s clinical signs and symptoms of active bleeding Mr. Don should be brought directly to angiography.          Riley Rubio  Interventional Radiology Fellow  May 11, 2019    [phone] After Hours

## 2019-05-11 NOTE — PROGRESS NOTES
[facility], [place]  Tertiary Survey Progress Note     Date of Service (when I saw the patient): 05/11/2019     Assessment & Plan   Trauma mechanism:ground level fall  Time/date of injury: 5/10/2019 at 0715   Known Injuries:  1. Grade III splenic injury with active extravascation  Other diagnoses:   1. Acute pain  2. DM2  3. Acute blood loss anemia  4. leukocytosis  5. HTN  6. BPH  7. Calcified pleural plaques, right lower lobe focal density measuring 3.7 x 1.3cm noted on CT chest- follow up recommended outpatient in 3 months- disclosed to patient        Procedure:   05/10/19- S/P splenic embolization     Plan:  1. Tertiary exam completed today, no other injuries identified.  2. Splenic laceration: S/P splenic laceration 05/11/2019  1. Acute blood loss anemia/ thrombocytopenia: Hemoglobin 14.3 at initial presentation dropped to 11.9- 1 unit PRBC given prior to procedure-->drifting 11.0 today. Platelets 104-->72 today. No antiplatelet therapies PTA, no platelets transfused. Mild LUQ pain, distended, soft to palpation, +flatus  2. CBC daily and transfuse as needed for Hbg <7 or if concern for active bleed. No need for platelet transfusion now, monitor.  3. Hold chemical DVT prophylaxis x 48 hours post injury, PCD's for now, up in room.  4. Leukocytosis: WBC 30.6 on admission--> 13.8, most likely 2/2 trauma, no overt signs of infection noted. CXR without infiltrates, afebrile, Send UA/UC  5. Will require post splenectomy vaccines prior to discharge  6. CT angiogram of the abdomen should be obtained in 1 to 2 weeks as an outpatient to evaluate for residual pseudoaneurysms- will order at DC  3. Acute pain: PRN Tylenol, Oxycodone, Lidoderm patches, Hydromorphone for breakthrough pain  4. Resp: no acute issues, routine pulmonary toilet  1. Continue outpatient follow up with Pulmonologist for pleural plaques and RUL nodule.  5. Cardiac: Hx hypertension: Continue to hold home antihypertensives  for now, plan to resume as BP tolerates.  6. GI: OK for regular diet, bowel regimen ordered  7. : Required straight cath x1 today for retention. Resume home Flomax. Strict/ I/O  8. Endocrine:   1. Hx DM2/ Stress hyperglycemia: No home insulin or oral hyperglycemics. Hbg A1C 7.6  1. Continue Novolog insulin sliding scale for now with  blood glucose checks.  9. Up ad laith  10. PT/OT     Code status: Full code .      General Cares:    PPI/H2 blocker:  N/A   DVT prophylaxis: Mechanical for now   Bowel Regimen/Date of last stool: PTA/ ordered   Pulmonary toilet: early mobility, IS   ETOH screen completed yes   Lines / drains: none    Discharge goals:     Adequate pain management: yes    VSS x24 hours: ongoing    Hemoglobin stable x 48 hours: ongoing    Ambulating safely and/or therapy evals complete: pending    Drains/lines removed or plan in place to manage: yes    Teaching done: Ongoing    Other:  OK to transfer to floor today    Interval History   Reports LUQ pain tolerable. Denies other pain, denies shortness of breath  Review of Systems     Skin: negative  Eyes: negative  Ears/Nose/Throat: negative  Respiratory: No shortness of breath, dyspnea on exertion, cough, or hemoptysis  Cardiovascular: negative  Gastrointestinal: negative  Genitourinary: Occasional difficulty with bladder emptying  Musculoskeletal: negative  Neurologic: negative  Psychiatric: negative  Hematologic/Lymphatic/Immunologic: negative  Endocrine: negative     Physical Exam     Naz Coma Scale - Total 15/15  Eye Response (E): 4   4= spontaneous, 3= to verbal/voice, 2= to pain, 1= No response   Verbal Response (V): 5   5= Orientated, converses, 4= Confused, converses, 3= Inappropriate words, 2= Incomprehensible sounds, 1=No response   Motor Response (M): 6   6= Obeys commands, 5= Localizes to pain, 4= Withdrawal to pain, 3=Fexion to pain, 2= Extension to pain, 1= No response   Physical Exam   Constitutional: He is oriented to person, place, and  time. He appears well-developed.   HENT:   Head: Normocephalic and atraumatic.   Eyes: Pupils are equal, round, and reactive to light.   Neck: Normal range of motion.   Cardiovascular: Normal rate and regular rhythm.   Pulmonary/Chest: Effort normal and breath sounds normal.   Abdominal: Soft. Bowel sounds are normal.   LUQ tender to palpation   Musculoskeletal: Normal range of motion.   Neurological: He is alert and oriented to person, place, and time.   Skin: Skin is warm and dry. Capillary refill takes less than 2 seconds.   Psychiatric: He has a normal mood and affect. His behavior is normal.       Temp: 98.2  F (36.8  C) Temp src: Oral BP: 122/76 Pulse: 94 Heart Rate: 89 Resp: 15 SpO2: 94 % O2 Device: None (Room air) Oxygen Delivery: 2 LPM  Vitals:    05/10/19 1135   Weight: 77.1 kg (169 lb 14.4 oz)     Vital Signs with Ranges  Temp:  [97.3  F (36.3  C)-99.5  F (37.5  C)] 98.2  F (36.8  C)  Pulse:  [] 94  Heart Rate:  [] 89  Resp:  [10-30] 15  BP: ()/(28-82) 122/76  SpO2:  [90 %-100 %] 94 %  I/O last 3 completed shifts:  In: 1765 [P.O.:25; I.V.:1740]  Out: 500 [Urine:500]      PARMINDER Napoles CNP  To contact the trauma service use job code pager [phone],   Numeric texts or alpha text through [facility]

## 2019-05-11 NOTE — PLAN OF CARE
OT 4A/7B: Cancel and DEFER.  Acknowledge order placed for OT eval and treat.  Upon chart review and discussion with PT, no acute OT needs identified.  Pt is ambulating independently, is active and independent with all ADLs at baseline.  Pt has good family support at home prn.  Please refer to PT note for further details on discharge recommendations.  Will discontinue OT orders.

## 2019-05-11 NOTE — PLAN OF CARE
PT 7B: PT evaluation completed, treatment initiated.   Discharge Planner PT   Patient plan for discharge: home   Current status: pt IND with transfers, ambulates 500ft without AD, some mild lateral instability but no overt LOB. Pt completes 4 stairs with SBA without use of railings. SOB with activity, SpO2 95% before and after walking on room air.   Barriers to return to prior living situation: none   Recommendations for discharge: home with assist from wife as needed.   Rationale for recommendations: pt moving at his baseline mobility, no further PT needs indicated, PT orders will be completed.        Entered by: Divina Griffin 05/11/2019 2:16 PM     This pt is safe and appropriate to walk in hallways, pt should ambulate 3-4x daily while inpatient to prevent deconditioning. Pt IND with gait, may need SBA/assist for IV pole management if IV running.     Physical Therapy Discharge Summary    Reason for therapy discharge:    All goals and outcomes met, no further needs identified.    Progress towards therapy goal(s). See goals on Care Plan in [facility] electronic health record for goal details.  Goals met    Therapy recommendation(s):    No further formal therapy needed, pt instructed with walking program while inpatient.

## 2019-05-12 ENCOUNTER — APPOINTMENT (OUTPATIENT)
Dept: GENERAL RADIOLOGY | Facility: CLINIC | Age: 79
DRG: 982 | End: 2019-05-12
Attending: NURSE PRACTITIONER
Payer: COMMERCIAL

## 2019-05-12 ENCOUNTER — APPOINTMENT (OUTPATIENT)
Dept: ULTRASOUND IMAGING | Facility: CLINIC | Age: 79
DRG: 982 | End: 2019-05-12
Attending: NURSE PRACTITIONER
Payer: COMMERCIAL

## 2019-05-12 ENCOUNTER — APPOINTMENT (OUTPATIENT)
Dept: GENERAL RADIOLOGY | Facility: CLINIC | Age: 79
DRG: 982 | End: 2019-05-12
Attending: SURGERY
Payer: COMMERCIAL

## 2019-05-12 ENCOUNTER — APPOINTMENT (OUTPATIENT)
Dept: CT IMAGING | Facility: CLINIC | Age: 79
DRG: 982 | End: 2019-05-12
Attending: NURSE PRACTITIONER
Payer: COMMERCIAL

## 2019-05-12 LAB
ALBUMIN UR-MCNC: 10 MG/DL
ANION GAP SERPL CALCULATED.3IONS-SCNC: 6 MMOL/L (ref 3–14)
APPEARANCE UR: CLEAR
BACTERIA #/AREA URNS HPF: ABNORMAL /HPF
BILIRUB UR QL STRIP: NEGATIVE
BUN SERPL-MCNC: 19 MG/DL (ref 7–30)
CALCIUM SERPL-MCNC: 7.9 MG/DL (ref 8.5–10.1)
CHLORIDE SERPL-SCNC: 102 MMOL/L (ref 94–109)
CO2 SERPL-SCNC: 27 MMOL/L (ref 20–32)
COLOR UR AUTO: YELLOW
CREAT SERPL-MCNC: 0.69 MG/DL (ref 0.66–1.25)
ERYTHROCYTE [DISTWIDTH] IN BLOOD BY AUTOMATED COUNT: 13.2 % (ref 10–15)
ERYTHROCYTE [DISTWIDTH] IN BLOOD BY AUTOMATED COUNT: 13.3 % (ref 10–15)
GFR SERPL CREATININE-BSD FRML MDRD: >90 ML/MIN/{1.73_M2}
GLUCOSE BLDC GLUCOMTR-MCNC: 188 MG/DL (ref 70–99)
GLUCOSE BLDC GLUCOMTR-MCNC: 190 MG/DL (ref 70–99)
GLUCOSE BLDC GLUCOMTR-MCNC: 199 MG/DL (ref 70–99)
GLUCOSE BLDC GLUCOMTR-MCNC: 200 MG/DL (ref 70–99)
GLUCOSE BLDC GLUCOMTR-MCNC: 207 MG/DL (ref 70–99)
GLUCOSE SERPL-MCNC: 192 MG/DL (ref 70–99)
GLUCOSE UR STRIP-MCNC: NEGATIVE MG/DL
HCT VFR BLD AUTO: 25.3 % (ref 40–53)
HCT VFR BLD AUTO: 26.8 % (ref 40–53)
HGB BLD-MCNC: 8.5 G/DL (ref 13.3–17.7)
HGB BLD-MCNC: 9 G/DL (ref 13.3–17.7)
HGB BLD-MCNC: 9.1 G/DL (ref 13.3–17.7)
HGB UR QL STRIP: NEGATIVE
KETONES UR STRIP-MCNC: 10 MG/DL
LACTATE BLD-SCNC: 1.4 MMOL/L (ref 0.7–2)
LEUKOCYTE ESTERASE UR QL STRIP: NEGATIVE
MCH RBC QN AUTO: 30.5 PG (ref 26.5–33)
MCH RBC QN AUTO: 30.7 PG (ref 26.5–33)
MCHC RBC AUTO-ENTMCNC: 33.6 G/DL (ref 31.5–36.5)
MCHC RBC AUTO-ENTMCNC: 33.6 G/DL (ref 31.5–36.5)
MCV RBC AUTO: 91 FL (ref 78–100)
MCV RBC AUTO: 91 FL (ref 78–100)
MUCOUS THREADS #/AREA URNS LPF: PRESENT /LPF
NITRATE UR QL: NEGATIVE
PH UR STRIP: 5.5 PH (ref 5–7)
PLATELET # BLD AUTO: 71 10E9/L (ref 150–450)
PLATELET # BLD AUTO: 78 10E9/L (ref 150–450)
POTASSIUM SERPL-SCNC: 3.9 MMOL/L (ref 3.4–5.3)
PROCALCITONIN SERPL-MCNC: 0.16 NG/ML
RADIOLOGIST FLAGS: NORMAL
RBC # BLD AUTO: 2.77 10E12/L (ref 4.4–5.9)
RBC # BLD AUTO: 2.95 10E12/L (ref 4.4–5.9)
RBC #/AREA URNS AUTO: 1 /HPF (ref 0–2)
SODIUM SERPL-SCNC: 136 MMOL/L (ref 133–144)
SOURCE: ABNORMAL
SP GR UR STRIP: 1.03 (ref 1–1.03)
UROBILINOGEN UR STRIP-MCNC: NORMAL MG/DL (ref 0–2)
WBC # BLD AUTO: 12.6 10E9/L (ref 4–11)
WBC # BLD AUTO: 15.8 10E9/L (ref 4–11)
WBC #/AREA URNS AUTO: 1 /HPF (ref 0–5)

## 2019-05-12 PROCEDURE — 85027 COMPLETE CBC AUTOMATED: CPT | Performed by: NURSE PRACTITIONER

## 2019-05-12 PROCEDURE — 40000141 ZZH STATISTIC PERIPHERAL IV START W/O US GUIDANCE

## 2019-05-12 PROCEDURE — 40000802 ZZH SITE CHECK

## 2019-05-12 PROCEDURE — 83605 ASSAY OF LACTIC ACID: CPT | Performed by: SURGERY

## 2019-05-12 PROCEDURE — 74174 CTA ABD&PLVS W/CONTRAST: CPT

## 2019-05-12 PROCEDURE — 25000132 ZZH RX MED GY IP 250 OP 250 PS 637: Performed by: NURSE PRACTITIONER

## 2019-05-12 PROCEDURE — 84145 PROCALCITONIN (PCT): CPT | Performed by: NURSE PRACTITIONER

## 2019-05-12 PROCEDURE — 25000132 ZZH RX MED GY IP 250 OP 250 PS 637

## 2019-05-12 PROCEDURE — 40000986 XR ABDOMEN PORT 1 VW

## 2019-05-12 PROCEDURE — 25000132 ZZH RX MED GY IP 250 OP 250 PS 637: Performed by: STUDENT IN AN ORGANIZED HEALTH CARE EDUCATION/TRAINING PROGRAM

## 2019-05-12 PROCEDURE — 36415 COLL VENOUS BLD VENIPUNCTURE: CPT | Performed by: NURSE PRACTITIONER

## 2019-05-12 PROCEDURE — 80048 BASIC METABOLIC PNL TOTAL CA: CPT

## 2019-05-12 PROCEDURE — 99232 SBSQ HOSP IP/OBS MODERATE 35: CPT | Performed by: NURSE PRACTITIONER

## 2019-05-12 PROCEDURE — 36415 COLL VENOUS BLD VENIPUNCTURE: CPT

## 2019-05-12 PROCEDURE — 00000146 ZZHCL STATISTIC GLUCOSE BY METER IP

## 2019-05-12 PROCEDURE — 25000125 ZZHC RX 250: Performed by: SURGERY

## 2019-05-12 PROCEDURE — 85027 COMPLETE CBC AUTOMATED: CPT

## 2019-05-12 PROCEDURE — 12000001 ZZH R&B MED SURG/OB UMMC

## 2019-05-12 PROCEDURE — 84145 PROCALCITONIN (PCT): CPT

## 2019-05-12 PROCEDURE — 71045 X-RAY EXAM CHEST 1 VIEW: CPT

## 2019-05-12 PROCEDURE — 25000128 H RX IP 250 OP 636: Performed by: SURGERY

## 2019-05-12 PROCEDURE — 93970 EXTREMITY STUDY: CPT

## 2019-05-12 PROCEDURE — 85018 HEMOGLOBIN: CPT | Performed by: NURSE PRACTITIONER

## 2019-05-12 PROCEDURE — 81001 URINALYSIS AUTO W/SCOPE: CPT | Performed by: NURSE PRACTITIONER

## 2019-05-12 RX ORDER — LANOLIN ALCOHOL/MO/W.PET/CERES
3 CREAM (GRAM) TOPICAL
Status: DISCONTINUED | OUTPATIENT
Start: 2019-05-12 | End: 2019-05-16 | Stop reason: HOSPADM

## 2019-05-12 RX ORDER — DEXTROSE MONOHYDRATE, SODIUM CHLORIDE, AND POTASSIUM CHLORIDE 50; 1.49; 4.5 G/1000ML; G/1000ML; G/1000ML
INJECTION, SOLUTION INTRAVENOUS CONTINUOUS
Status: DISCONTINUED | OUTPATIENT
Start: 2019-05-12 | End: 2019-05-13

## 2019-05-12 RX ORDER — BISACODYL 10 MG
10 SUPPOSITORY, RECTAL RECTAL ONCE
Status: COMPLETED | OUTPATIENT
Start: 2019-05-12 | End: 2019-05-12

## 2019-05-12 RX ORDER — IOPAMIDOL 755 MG/ML
100 INJECTION, SOLUTION INTRAVASCULAR ONCE
Status: COMPLETED | OUTPATIENT
Start: 2019-05-12 | End: 2019-05-12

## 2019-05-12 RX ADMIN — POLYETHYLENE GLYCOL 3350 17 G: 17 POWDER, FOR SOLUTION ORAL at 08:08

## 2019-05-12 RX ADMIN — SENNOSIDES AND DOCUSATE SODIUM 2 TABLET: 8.6; 5 TABLET ORAL at 08:08

## 2019-05-12 RX ADMIN — ACETAMINOPHEN 975 MG: 325 TABLET, FILM COATED ORAL at 08:08

## 2019-05-12 RX ADMIN — METHOCARBAMOL 500 MG: 500 TABLET, FILM COATED ORAL at 12:53

## 2019-05-12 RX ADMIN — TAMSULOSIN HYDROCHLORIDE 0.4 MG: 0.4 CAPSULE ORAL at 22:33

## 2019-05-12 RX ADMIN — BISACODYL 10 MG: 10 SUPPOSITORY RECTAL at 08:10

## 2019-05-12 RX ADMIN — IOPAMIDOL 100 ML: 755 INJECTION, SOLUTION INTRAVENOUS at 14:03

## 2019-05-12 RX ADMIN — LIDOCAINE 1 PATCH: 560 PATCH PERCUTANEOUS; TOPICAL; TRANSDERMAL at 20:30

## 2019-05-12 RX ADMIN — METHOCARBAMOL 500 MG: 500 TABLET, FILM COATED ORAL at 22:33

## 2019-05-12 RX ADMIN — MELATONIN TAB 3 MG 3 MG: 3 TAB at 22:33

## 2019-05-12 RX ADMIN — SODIUM CHLORIDE, PRESERVATIVE FREE 100 ML: 5 INJECTION INTRAVENOUS at 14:03

## 2019-05-12 RX ADMIN — METHOCARBAMOL 500 MG: 500 TABLET, FILM COATED ORAL at 04:08

## 2019-05-12 RX ADMIN — DOCUSATE SODIUM 286 ML: 50 LIQUID ORAL at 17:29

## 2019-05-12 ASSESSMENT — ACTIVITIES OF DAILY LIVING (ADL)
ADLS_ACUITY_SCORE: 13
ADLS_ACUITY_SCORE: 11
ADLS_ACUITY_SCORE: 13

## 2019-05-12 NOTE — PLAN OF CARE
Blood pressure 145/79, pulse 94, temperature 98.7  F (37.1  C), temperature source Oral, resp. rate 16, weight 77.1 kg (169 lb 14.4 oz), SpO2 95 %.   Patient with distended abd,  hypo bowel sounds, states passing small amt of flatus, no bm (had portable abd XR on prior shift).  Complained of being SOB, O2 sats in low 90's on room air.  Put 2 liters O2 per NC on, O2 sats in high 90's and patient more comfortable.  Lung sounds diminished.  Voided x2, UA sent.  Rt groin with slight oozing, area bruised.   RLE with positive pedal pulse, denies numbness or tingling.  Alert and oriented x4.  Continue with POC.

## 2019-05-12 NOTE — PLAN OF CARE
/69 (BP Location: Right arm)   Pulse 94   Temp 99.2  F (37.3  C) (Oral)   Resp 16   Wt 77.1 kg (169 lb 14.4 oz)   SpO2 93%   BMI 30.10 kg/m      Activity: Independent/SBA, ambulates to bathroom  Neuros: A & O x4, denies numbness and tingling  Cardiac: Tachy most of the shift, no edema present  Respiratory: Sats stable on RA, complains of SOB related to abdominal distention  GI: +BS, +flatus, no BM. Abdomen round, distended, pt complains of fullness and constipation  : Voids spontaneously, UA needed  Diet: Regular, appears to tolerate  Skin: Warm, dry, intact. Bruising from fall and splenic embolization  Lines: L PIV SL (2 other PIV removed this shift)  Incisions/Drains: None  Labs: BG before meals and at bedtime (147, 161)  Pain/nausea:  Pain is tolerable, scheduled robaxin given. Pt denies nausea.  New changes this shift:  New onset of SOB and abdominal distention. MD ordered abdominal xray and milk of magnesia to promote bowel function.  Plan: Continue to monitor and follow POC

## 2019-05-12 NOTE — PROGRESS NOTES
[facility], [place]  Trauma Service Progress Note    Date of Service (when I saw the patient): 05/12/2019     Assessment & Plan   Trauma mechanism:ground level fall  Time/date of injury: 5/10/2019 at 0715   Known Injuries:  1. Grade III splenic injury with active extravascation  Other diagnoses:   1. Acute pain  2. DM2  3. Acute blood loss anemia  4. leukocytosis  5. HTN  6. BPH  7. Calcified pleural plaques, right lower lobe focal density measuring 3.7 x 1.3cm noted on CT chest- follow up recommended outpatient in 3 months- disclosed to patient  8. Concern for ileus        Procedure:   05/10/19- S/P splenic embolization     Plan:  1. Tertiary exam completed today, no other injuries identified.  2. Splenic laceration: S/P splenic laceration 05/11/2019  1. Acute blood loss anemia/ thrombocytopenia: Hemoglobin 14.3 at initial presentation dropped to 11.9- 1 unit PRBC given prior to procedure--dropped 2gms in 24 hours. Platelets 104-->72 today. No antiplatelet therapies PTA, no platelets transfused. Mild LUQ pain, more distended today +flatus. Complained of feeling short of breath, lightheaded with mobility and bloated.  2. CBC every 12 hours,and transfuse as needed for Hbg <7 or if concern for active bleed. No need for platelet transfusion now, monitor. Discussed repeat CT abdomen with staff, will hold off for now and monitor.  3. Hold chemical DVT prophylaxis given ongoing hemoglobin drop  4. Leukocytosis: WBC 30.6 on admission--> downtrending. UA without concern for UTI. However given tachycardia, low grade fever, SOB and new supplemental oxygen requirement CXR obtained today. CXR indicative of new bilateral effusions and atelectasis. Etiologies include pneumonia, atelectasis, pulmonary embolism. Discussed CT PE with staff, will be in favor of holding for now, obtain bilateral lower extremity ultrasounds, aggressive pulmonary toileting and monitor. Unable to anticoagulate given splenic  rupture, downtrending hemoglobin  5. Obtain Orthostatics  6. Will require post splenectomy vaccines prior to discharge  7. CT angiogram of the abdomen to evaluate for ongoing bleed now upon further discussions with interventional radiology given tachycardia, distension, 2 gram hemoglobin drop  3. Acute pain: PRN Tylenol, Oxycodone, Lidoderm patches, Hydromorphone for breakthrough pain  4. Resp: Bilateral effusions, atelectasis on CXR, RT to assist with acapella use, aggressive pulmonary toilet  1. Continue outpatient follow up with Pulmonologist for pleural plaques and RUL nodule.  5. Cardiac: Hx hypertension: Continue to hold home antihypertensives for now, plan to resume as BP tolerates.  6. GI  1. Concern for ileus: Abdominal xray with non obstructive gas pattern, abdominal distention. Medium BM with suppository today, denies nausea or vomiting  2. Keep NPO. MIVF while NPO.  3. Place nasogastric tube- LIWS  4. Pink lady enema x1  5. Hold narcotics for now  7. : Required straight cath x1 today for retention. Resume home Flomax. Strict/ I/O  8. Endocrine:   1. Hx DM2/ Stress hyperglycemia: No home insulin or oral hyperglycemics. Hbg A1C 7.6  1. Continue Novolog insulin sliding scale for now with  blood glucose checks.  9. Up ad laith  10. PT/OT     Code status: Full code .      General Cares:               PPI/H2 blocker:  N/A              DVT prophylaxis: Mechanical for now              Bowel Regimen/Date of last stool: see above              Pulmonary toilet: early mobility, IS, acapella              ETOH screen completed yes              Lines / drains: none     Discharge goals:     Adequate pain management: yes    VSS x24 hours: ongoing    Hemoglobin stable x 48 hours: ongoing    Ambulating safely and/or therapy evals complete: yes    Drains/lines removed or plan in place to manage: yes    Teaching done: Ongoing    Other:  Dispo: pending stable hemoglobin, improvement in bowel status    Interval History    Reported feeling lightheaded, distended, short of breath. New supplemental oxygen requirements. Had a medium size BM with supp today. States he has not been drinking enough  ROS x 8 negative with exception of those things listed in interval hx    Physical Exam   Temp: 99.4  F (37.4  C) Temp src: Oral BP: 151/74   Heart Rate: 106 Resp: 16 SpO2: 98 % O2 Device: Nasal cannula Oxygen Delivery: 2 LPM  Vitals:    05/10/19 1135   Weight: 77.1 kg (169 lb 14.4 oz)     Vital Signs with Ranges  Temp:  [97.6  F (36.4  C)-99.4  F (37.4  C)] 99.4  F (37.4  C)  Heart Rate:  [] 106  Resp:  [16-22] 16  BP: (126-151)/(69-79) 151/74  SpO2:  [90 %-98 %] 98 %  I/O last 3 completed shifts:  In: 1200 [P.O.:1060; I.V.:140]  Out: 175 [Urine:175]    Naz Coma Scale - Total 15/15    Constitutional: Awake, alert, cooperative, no apparent distress.  Eyes: Lids and lashes normal, pupils equal, round and reactive to light, extra ocular muscles intact, sclera clear, conjunctiva normal.  ENT: Normocephalic, atraumatic  Respiratory: dyspnea on exertion, dim posteriorly.  Cardiovascular:  regular rate and rhythm, normal S1 and S2, tachy  GI: Distended, LUQ tenderness  Genitourinary:  Not seen  Skin:  Normal skin color, no redness, warmth, or swelling, no ecchymosis, no abrasions, and no jaundice.  Musculoskeletal: There is no redness, warmth, or swelling of the joints.  Pedal pulse palpated.  Neurologic: Awake, alert, oriented. Cranial nerves II-XII are grossly intact.  Strength and sensory is intact. No focal deficits.  Neuropsychiatric: Calm, normal eye contact, alert, affect appropriate to situation, oriented, thought process normal.    PARMINDER Napoles CNP  To contact the trauma service use job code pager [phone],   Numeric texts or alpha text through [facility]

## 2019-05-13 LAB
ANION GAP SERPL CALCULATED.3IONS-SCNC: 8 MMOL/L (ref 3–14)
BUN SERPL-MCNC: 11 MG/DL (ref 7–30)
CALCIUM SERPL-MCNC: 7.5 MG/DL (ref 8.5–10.1)
CHLORIDE SERPL-SCNC: 103 MMOL/L (ref 94–109)
CO2 SERPL-SCNC: 26 MMOL/L (ref 20–32)
CREAT SERPL-MCNC: 0.6 MG/DL (ref 0.66–1.25)
ERYTHROCYTE [DISTWIDTH] IN BLOOD BY AUTOMATED COUNT: 13.3 % (ref 10–15)
GFR SERPL CREATININE-BSD FRML MDRD: >90 ML/MIN/{1.73_M2}
GLUCOSE BLDC GLUCOMTR-MCNC: 161 MG/DL (ref 70–99)
GLUCOSE BLDC GLUCOMTR-MCNC: 168 MG/DL (ref 70–99)
GLUCOSE BLDC GLUCOMTR-MCNC: 196 MG/DL (ref 70–99)
GLUCOSE BLDC GLUCOMTR-MCNC: 232 MG/DL (ref 70–99)
GLUCOSE SERPL-MCNC: 195 MG/DL (ref 70–99)
HCT VFR BLD AUTO: 24 % (ref 40–53)
HGB BLD-MCNC: 8.1 G/DL (ref 13.3–17.7)
MCH RBC QN AUTO: 30.6 PG (ref 26.5–33)
MCHC RBC AUTO-ENTMCNC: 33.8 G/DL (ref 31.5–36.5)
MCV RBC AUTO: 91 FL (ref 78–100)
PLATELET # BLD AUTO: 66 10E9/L (ref 150–450)
POTASSIUM SERPL-SCNC: 3.9 MMOL/L (ref 3.4–5.3)
RBC # BLD AUTO: 2.65 10E12/L (ref 4.4–5.9)
SODIUM SERPL-SCNC: 137 MMOL/L (ref 133–144)
WBC # BLD AUTO: 15.3 10E9/L (ref 4–11)

## 2019-05-13 PROCEDURE — 25800030 ZZH RX IP 258 OP 636: Performed by: NURSE PRACTITIONER

## 2019-05-13 PROCEDURE — 25000128 H RX IP 250 OP 636: Performed by: NURSE PRACTITIONER

## 2019-05-13 PROCEDURE — 85027 COMPLETE CBC AUTOMATED: CPT

## 2019-05-13 PROCEDURE — 99233 SBSQ HOSP IP/OBS HIGH 50: CPT | Performed by: NURSE PRACTITIONER

## 2019-05-13 PROCEDURE — 25000132 ZZH RX MED GY IP 250 OP 250 PS 637: Performed by: NURSE PRACTITIONER

## 2019-05-13 PROCEDURE — 80048 BASIC METABOLIC PNL TOTAL CA: CPT

## 2019-05-13 PROCEDURE — 25000132 ZZH RX MED GY IP 250 OP 250 PS 637

## 2019-05-13 PROCEDURE — 40000556 ZZH STATISTIC PERIPHERAL IV START W US GUIDANCE

## 2019-05-13 PROCEDURE — 00000146 ZZHCL STATISTIC GLUCOSE BY METER IP

## 2019-05-13 PROCEDURE — 12000001 ZZH R&B MED SURG/OB UMMC

## 2019-05-13 PROCEDURE — 36415 COLL VENOUS BLD VENIPUNCTURE: CPT

## 2019-05-13 RX ORDER — FUROSEMIDE 10 MG/ML
10 INJECTION INTRAMUSCULAR; INTRAVENOUS ONCE
Status: COMPLETED | OUTPATIENT
Start: 2019-05-13 | End: 2019-05-13

## 2019-05-13 RX ORDER — METHOCARBAMOL 500 MG/1
500 TABLET, FILM COATED ORAL EVERY 6 HOURS PRN
Status: DISCONTINUED | OUTPATIENT
Start: 2019-05-13 | End: 2019-05-16 | Stop reason: HOSPADM

## 2019-05-13 RX ADMIN — SIMVASTATIN 20 MG: 10 TABLET, FILM COATED ORAL at 20:19

## 2019-05-13 RX ADMIN — TAMSULOSIN HYDROCHLORIDE 0.4 MG: 0.4 CAPSULE ORAL at 22:23

## 2019-05-13 RX ADMIN — LIDOCAINE 1 PATCH: 560 PATCH PERCUTANEOUS; TOPICAL; TRANSDERMAL at 20:19

## 2019-05-13 RX ADMIN — METHOCARBAMOL 500 MG: 500 TABLET, FILM COATED ORAL at 04:28

## 2019-05-13 RX ADMIN — FUROSEMIDE 10 MG: 10 INJECTION, SOLUTION INTRAVENOUS at 10:57

## 2019-05-13 RX ADMIN — POTASSIUM CHLORIDE, DEXTROSE MONOHYDRATE AND SODIUM CHLORIDE: 150; 5; 450 INJECTION, SOLUTION INTRAVENOUS at 04:22

## 2019-05-13 ASSESSMENT — ACTIVITIES OF DAILY LIVING (ADL)
ADLS_ACUITY_SCORE: 13
ADLS_ACUITY_SCORE: 14

## 2019-05-13 NOTE — PLAN OF CARE
/72 (BP Location: Right arm)   Pulse 94   Temp 100.5  F (38.1  C) (Oral)   Resp 16   Wt 78.4 kg (172 lb 14.4 oz)   SpO2 99%   BMI 30.63 kg/m     This shift: team concern for ileus  Neuro: AOx4, pleasant,   Cardiac: VSS,  Respiratory: on 3L on oxygen for comfort   GI/: abd distended, suppository given, enema given, several bm on this shift, abd soft, still having discomfort. Adequate urine output with no savings  Diet/Appetite: NPO, NG LIS for decompression  Skin: right groin bruised  LDA: Dex5%KCl at 75ml/hr  Labs: hgb: 9.0, CT  Activity: Ax1, ambulate in the vogt  Pain: Narcotics on hold for now per trauma team  Plan: continue to monitor hgb and follow POC

## 2019-05-13 NOTE — PLAN OF CARE
Blood pressure 130/68, pulse 94, temperature 97.8  F (36.6  C), resp. rate 18, weight 78.4 kg (172 lb 14.4 oz), SpO2 98 %.   Patient with distended abd but less firm.  Hypo bowel sounds, patient states positive flatus, no bm this shift.  NG to LIS but has pulled only 50 ml.  Lungs diminished, O2 sats high 90's on 3 liters.  Tmax 100.9, tachy in low 100's.  Triggered septic protocol, lactic 1.4.  Voiding spontaneously, adequate amts.  Alert and oriented x4 but impulsive, bed alarm on.  Glc 207/196.   Patient refused tylenol.  Continue with POC.

## 2019-05-13 NOTE — PROGRESS NOTES
[facility], [place]  Trauma Service Progress Note    Date of Service (when I saw the patient): 05/13/2019     Assessment & Plan     Trauma Mechanism: Ground level fall  Known Injuries:  1. Grade III splenic injury with extravasation                   Other diagnoses:  1. Acute traumatic pain  2. Concern for ileus  3. Acute blood loss anemia  4. Leukocytosis  5. DM2  6. HTN  7. BPH  8. Calcified pleural plaques, right lower lobe focal density noted on CT chest - previously disclosed to patient    Procedure(s): splenic embolization 5/10/19    Plan:  1. Admitted to the trauma service. Teritary survey completed 5/11/19 with no additional injuries identified.    2. Splenic laceration s/p embolization on 5/11/19  3. Acute blood loss anemia    Repeat CT abdomen/pelvis 5/12/19 with increase perisplenic hematoma with foci of gas, left abdominal retroperitoneal hemorrhage not significantly changed, nothing to suggest active bleeding, small volume hemoperitoneum in lower abdomen increased    Repeat CBC tomorrow, transfuse for hemoglobin <7    Holding chemical DVT prophylaxis given dropping hemoglogin    Repeat CT angiogram in 1-2 weeks as outpatient to evaluate for residual pseudoaneurysms, needs to be ordered at discharge    Follow-up TBD    Will need splenectomy vaccinations prior to discharge:      Splenic Vaccination: Post Splenectomy or Spleen injury   Indications:      All patients status post splenectomy      All patients with <50% intact spleen    Pneumovax 0.5mL IM   Will need to be done prior to discharge from the hospital,    --Pts will need a revaccination with PPSV in 5 years   Haemophilus vaccine (Hib) 0.5 mL IM     Will need to be done prior to discharge from the hospital,     --No revaccination needed   Meningococcal Vaccine (Menactra) 0.5 mL IM     Will need to be done prior to discharge from the hospital    Will need a second dose of meningococcal vaccine at least 2 months  apart    --Revaccination every 5 years    References:  http://www.cdc.gov/mmwr/preview/mmwrhtml/bm2641y1.htm    4. Concern for ileus    Passing flatus, NG tube removed this morning    Okay for clear liquid diet    Stop MIVF    5. Acute traumatic pain    Scheduled acetaminophen and lidocaine patches    Methocarbamol available prn    Minimizing narcotics given concern for ileus    6. New hypoxia, suspect may be due to volume overload given CT results    Wean oxygen to maintain SpO2 >92%    Pulmonary hygiene with IS, OOB    Furosemide 10 mg IV x1    7. DM2 and stress induced hyperglycemia    Sliding scale insulin    8. HTN    Holding home triamterene-hydrochlorothiazide    General Cares:   PPI/H2 blocker: none indicated  DVT prophylaxis: SCDs  Bowel regimen: senna and miralax  Pulmonary toilet: IS, OOB  Lines / drains: PIV  Patient's wife updated at the bedside    Code status:  Full code     Discharge goals:     Adequate pain management: yes    VSS x24 hours: yes    Hemoglobin stable x 48 hours: no    Ambulating safely and/or therapy evals complete: yes    Drains/lines removed or plan in place to manage: PIV  Expected D/C date: 1-2 days pending stability of hemoglobin    Interval History   Pain is controlled, currently rates it at 4/10 in his left abdomen and side. Mild shortness of breath when he's getting back to bed. No nausea, feels abdominal distention is improved from yesterday. Biggest complaint this morning was the NG tube. No numbness/tingling.     Physical Exam   Temp: 97.2  F (36.2  C) Temp src: Oral BP: 134/73   Heart Rate: 103 Resp: 18 SpO2: 96 % O2 Device: Nasal cannula Oxygen Delivery: 3 LPM  Vitals:    05/10/19 1135 05/12/19 1638   Weight: 77.1 kg (169 lb 14.4 oz) 78.4 kg (172 lb 14.4 oz)     Vital Signs with Ranges  Temp:  [97.2  F (36.2  C)-100.9  F (38.3  C)] 97.2  F (36.2  C)  Heart Rate:  [101-111] 103  Resp:  [16-18] 18  BP: (130-143)/(68-75) 134/73  SpO2:  [90 %-99 %] 96 %  I/O last 3 completed  shifts:  In: 1146 [I.V.:1086; NG/GT:60]  Out: 1100 [Urine:1050; Emesis/NG output:50]    Naz Coma Scale - Total 15/15    Constitutional: Awake, alert, cooperative, no apparent distress  Eyes: No icterus, lids and lashes normal  ENT: Mucous membranes moist; head normocephalic, atraumatic  Respiratory: No increased work of breathing, clear to auscultation bilaterally without crackles or wheezing  Cardiovascular: Regular rate and rhythm, S1/S2, no m/r/g; pedal pulses 2+, no pitting edema in the lower extremities  GI: Abdomen rounded, bowel sounds present, appears slightly distended; soft and non-tender to palpation, no guarding  Genitourinary: No urine assessed  Skin: Skin color normal with ecchymosis of right groin (IR access site) and left side/flank  Musculoskeletal: No deformities  Neurologic: Awake, alert, oriented. No focal deficits. Strength 5/5 bilaterally; sensation intact bilaterally  Neuropsychiatric: Calm    PARMINDER Chaudhari CNP  To contact the trauma service use job code pager [phone],   Numeric texts or alpha text through [facility]

## 2019-05-14 LAB
ABO + RH BLD: NORMAL
ABO + RH BLD: NORMAL
ANION GAP SERPL CALCULATED.3IONS-SCNC: 5 MMOL/L (ref 3–14)
BLD GP AB SCN SERPL QL: NORMAL
BLD PROD TYP BPU: NORMAL
BLD PROD TYP BPU: NORMAL
BLD UNIT ID BPU: 0
BLOOD BANK CMNT PATIENT-IMP: NORMAL
BLOOD PRODUCT CODE: NORMAL
BPU ID: NORMAL
BUN SERPL-MCNC: 14 MG/DL (ref 7–30)
CALCIUM SERPL-MCNC: 7.7 MG/DL (ref 8.5–10.1)
CHLORIDE SERPL-SCNC: 101 MMOL/L (ref 94–109)
CO2 SERPL-SCNC: 26 MMOL/L (ref 20–32)
CREAT SERPL-MCNC: 0.6 MG/DL (ref 0.66–1.25)
ERYTHROCYTE [DISTWIDTH] IN BLOOD BY AUTOMATED COUNT: 13.4 % (ref 10–15)
GFR SERPL CREATININE-BSD FRML MDRD: >90 ML/MIN/{1.73_M2}
GLUCOSE BLDC GLUCOMTR-MCNC: 100 MG/DL (ref 70–99)
GLUCOSE BLDC GLUCOMTR-MCNC: 145 MG/DL (ref 70–99)
GLUCOSE BLDC GLUCOMTR-MCNC: 147 MG/DL (ref 70–99)
GLUCOSE BLDC GLUCOMTR-MCNC: 165 MG/DL (ref 70–99)
GLUCOSE SERPL-MCNC: 163 MG/DL (ref 70–99)
HCT VFR BLD AUTO: 21.5 % (ref 40–53)
HGB BLD-MCNC: 7.1 G/DL (ref 13.3–17.7)
HGB BLD-MCNC: 8.6 G/DL (ref 13.3–17.7)
INR PPP: 1.13 (ref 0.86–1.14)
LACTATE BLD-SCNC: 0.9 MMOL/L (ref 0.7–2)
MCH RBC QN AUTO: 30.3 PG (ref 26.5–33)
MCHC RBC AUTO-ENTMCNC: 33 G/DL (ref 31.5–36.5)
MCV RBC AUTO: 92 FL (ref 78–100)
NUM BPU REQUESTED: 1
PLATELET # BLD AUTO: 86 10E9/L (ref 150–450)
POTASSIUM SERPL-SCNC: 3.7 MMOL/L (ref 3.4–5.3)
RBC # BLD AUTO: 2.34 10E12/L (ref 4.4–5.9)
SODIUM SERPL-SCNC: 132 MMOL/L (ref 133–144)
SPECIMEN EXP DATE BLD: NORMAL
TRANSFUSION STATUS PATIENT QL: NORMAL
TRANSFUSION STATUS PATIENT QL: NORMAL
WBC # BLD AUTO: 14.3 10E9/L (ref 4–11)

## 2019-05-14 PROCEDURE — 85027 COMPLETE CBC AUTOMATED: CPT | Performed by: NURSE PRACTITIONER

## 2019-05-14 PROCEDURE — 36415 COLL VENOUS BLD VENIPUNCTURE: CPT | Performed by: NURSE PRACTITIONER

## 2019-05-14 PROCEDURE — 86900 BLOOD TYPING SEROLOGIC ABO: CPT | Performed by: NURSE PRACTITIONER

## 2019-05-14 PROCEDURE — 12000001 ZZH R&B MED SURG/OB UMMC

## 2019-05-14 PROCEDURE — 80048 BASIC METABOLIC PNL TOTAL CA: CPT | Performed by: NURSE PRACTITIONER

## 2019-05-14 PROCEDURE — 25000132 ZZH RX MED GY IP 250 OP 250 PS 637: Performed by: NURSE PRACTITIONER

## 2019-05-14 PROCEDURE — 25000132 ZZH RX MED GY IP 250 OP 250 PS 637: Performed by: STUDENT IN AN ORGANIZED HEALTH CARE EDUCATION/TRAINING PROGRAM

## 2019-05-14 PROCEDURE — P9016 RBC LEUKOCYTES REDUCED: HCPCS | Performed by: NURSE PRACTITIONER

## 2019-05-14 PROCEDURE — 86901 BLOOD TYPING SEROLOGIC RH(D): CPT | Performed by: NURSE PRACTITIONER

## 2019-05-14 PROCEDURE — 85018 HEMOGLOBIN: CPT | Performed by: NURSE PRACTITIONER

## 2019-05-14 PROCEDURE — 86923 COMPATIBILITY TEST ELECTRIC: CPT | Performed by: NURSE PRACTITIONER

## 2019-05-14 PROCEDURE — 99233 SBSQ HOSP IP/OBS HIGH 50: CPT | Performed by: NURSE PRACTITIONER

## 2019-05-14 PROCEDURE — 86850 RBC ANTIBODY SCREEN: CPT | Performed by: NURSE PRACTITIONER

## 2019-05-14 PROCEDURE — 83605 ASSAY OF LACTIC ACID: CPT | Performed by: SURGERY

## 2019-05-14 PROCEDURE — 36415 COLL VENOUS BLD VENIPUNCTURE: CPT | Performed by: SURGERY

## 2019-05-14 PROCEDURE — 85610 PROTHROMBIN TIME: CPT | Performed by: NURSE PRACTITIONER

## 2019-05-14 PROCEDURE — 00000146 ZZHCL STATISTIC GLUCOSE BY METER IP

## 2019-05-14 RX ADMIN — METHOCARBAMOL 500 MG: 500 TABLET, FILM COATED ORAL at 04:40

## 2019-05-14 RX ADMIN — TAMSULOSIN HYDROCHLORIDE 0.4 MG: 0.4 CAPSULE ORAL at 22:30

## 2019-05-14 RX ADMIN — MELATONIN TAB 3 MG 3 MG: 3 TAB at 22:30

## 2019-05-14 ASSESSMENT — ACTIVITIES OF DAILY LIVING (ADL)
ADLS_ACUITY_SCORE: 14

## 2019-05-14 NOTE — PLAN OF CARE
Vitals:    05/13/19 0811 05/13/19 1140 05/13/19 1144 05/13/19 1608   BP: 134/73   143/71   BP Location: Left arm   Left arm   Pulse:       Resp: 18   18   Temp: 97.2  F (36.2  C)   97.8  F (36.6  C)   TempSrc: Oral   Oral   SpO2: 97% 90% 96% 94%   Weight:        Afebrile BP stable tachycardic, sating 92% on room air, lungs sound is clear,  Belly distended, passing gas, up to bathroom had two small stool,   Right groin puncture site bruised, left hip bruised, left arm swollen from IV infiltration,  NG removed, started clears, tolerating, decline tylenol, pt resting, continue with plan of care.

## 2019-05-14 NOTE — PLAN OF CARE
Vitals:    05/13/19 1950 05/13/19 2000 05/13/19 2226 05/14/19 0828   BP:   137/75 136/70   BP Location:   Right arm Left arm   Pulse:   99    Resp:   18 18   Temp:   99.3  F (37.4  C) 98.7  F (37.1  C)   TempSrc:   Oral Oral   SpO2: (!) 86% 93% 97% 98%   Weight:       T-max 99.3 BP stable, sating 98 on 2 lit O2 lungs clear and expiratory wheezy, belly distended, +BS passing gas and voiding adequate, right groin puncture site intact,  Hgb 7.1, one unit RBC infused, recheck 8.6, pt stated minimal abdominal discomfort, decline tylenol, lactic triggered 0.9,   Continue with plan of care.

## 2019-05-14 NOTE — PLAN OF CARE
/75 (BP Location: Right arm)   Pulse 99   Temp 99.3  F (37.4  C) (Oral)   Resp 18   Wt 78.4 kg (172 lb 14.4 oz)   SpO2 97%   BMI 30.63 kg/m      Time: 1900-0730      Reason for admission: Fall w/ splenic laceration.   Vitals: Afeb, slightly tachy, OVSS. O2 sats 97% on 2L NC. Pt c/o SOB at beginning of shift. Sats 86% on RA, placed on 2L NC.    Activity: Up ab laith/ SBA.   Pain: Reports pain on L abd/side d/t fall. Given Robaxin x1.   Neuro: A&Ox3. Calls appropriately. CMS intact. Denies N/T.   Cardiac: Slightly tachy.   Respiratory: LS clear & exp wheezes. Denies SOB after placed on oxygen.   GI: + BS, + gas, no BM. LBM 5/13.   : Voiding adequately, not saving.   Diet: CLD, tolerating, denies N/V.   Incisions/Drains: R groin incision CDI. Bruising noted around site and on L abd/hip.    IV Access: PIV SL.   Labs/Imaging:  & 165.    New changes this shift: Reported some SOB on RA, Sats below 90%. Placed on 2L satting well. Reports worsening pain on L abd/hip tonight. Given Robaxin x1.     Continue to monitor and follow POC

## 2019-05-14 NOTE — PROGRESS NOTES
[facility], [place]  Trauma Service Progress Note    Date of Service (when I saw the patient): 05/14/2019     Assessment & Plan   Trauma Mechanism: Ground level fall  Known Injuries:  1. Grade III splenic injury with extravasation                   Other diagnoses:  1. Acute traumatic pain  2. Concern for ileus  3. Acute blood loss anemia  4. Leukocytosis  5. DM2  6. HTN  7. BPH  8. Calcified pleural plaques, right lower lobe focal density noted on CT chest - previously disclosed to patient     Procedure(s): splenic embolization 5/10/19    Changes for Today:  Transfuse 1 unit PRBCs  Hemoglobin q8 hours after transfusion (scheduled for 1700)  NPO     Plan:  1. Admitted to the trauma service. Teritary survey completed 5/11/19 with no additional injuries identified.     2. Splenic laceration s/p embolization on 5/11/19  3. Acute blood loss anemia  ? Repeat CT abdomen/pelvis 5/12/19 with increase perisplenic hematoma with foci of gas, left abdominal retroperitoneal hemorrhage not significantly changed, nothing to suggest active bleeding, small volume hemoperitoneum in lower abdomen increased  ? Hemoglobin dropped to 7.1 this morning, transfuse 1 unit PRBCs now  ? Recheck hemoglobin a couple hours after transfusion (ordered for 1700) and then q8 hours  ? Discussed possibly need for splenectomy if inappropriate response to transfusion  ? NPO now  ? Holding chemical DVT prophylaxis given dropping hemoglogin  ? Repeat CT angiogram in 1-2 weeks as outpatient to evaluate for residual pseudoaneurysms, needs to be ordered at discharge  ? Follow-up at discharge TBD  ? Will need splenectomy vaccinations prior to discharge:            Splenic Vaccination: Post Splenectomy or Spleen injury         Indications:            All patients status post splenectomy            All patients with <50% intact spleen          Pneumovax 0.5mL IM   Will need to be done prior to discharge from the hospital,                       --Pts will need a revaccination with PPSV in 5 years         Haemophilus vaccine (Hib) 0.5 mL IM     Will need to be done prior to discharge from the hospital,     --No revaccination needed         Meningococcal Vaccine (Menactra) 0.5 mL IM     Will need to be done prior to discharge from the hospital    Will need a second dose of meningococcal vaccine at least 2 months apart    --Revaccination every 5 years          References:  http://www.cdc.gov/mmwr/preview/mmwrhtml/rg9534j3.htm     4. Concern for ileus  ? Passing flatus, had two BMs yesterday  ? Had been tolerating clears yesterday     5. Acute traumatic pain  ? Scheduled acetaminophen and lidocaine patches  ? Methocarbamol available prn  ? Minimizing narcotics given concern for ileus     6. New hypoxia, suspect may be due to volume overload given CT results. Received 1 dose furosemide 10 mg IV yesterday  ? Wean oxygen to maintain SpO2 >92%, likely does not need to 2L he is on this morning  ? Pulmonary hygiene with IS, OOB     7. DM2 and stress induced hyperglycemia  ? Sliding scale insulin, will hold off on increasing to high resistance scale given he is now newly NPO after having had a clear liquid diet     8. HTN  ? Holding home triamterene-hydrochlorothiazide     General Cares:   PPI/H2 blocker: none indicated  DVT prophylaxis: SCDs  Bowel regimen: senna and miralax  Pulmonary toilet: IS, OOB  Lines / drains: PIV     Code status:  Full code              Discharge goals:     Adequate pain management: yes    VSS x24 hours: yes    Hemoglobin stable x 48 hours: no    Ambulating safely and/or therapy evals complete: yes    Drains/lines removed or plan in place to manage: PIV  Expected D/C date: 1-2 days pending stability of hemoglobin     Interval History   Had an episode overnight where he bent over to  his urinal that had fallen on the floor and had pain and nausea after. He since has felt a little lightheaded when up, which he wasn't yesterday  during the day. This morning complains of left abdominal/side pain 6/10 and mild shortness of breath. He has an appetite this morning. Still passing flatus, no additional BMs overnight (two small ones during the day yesterday). No numbness/tingling anywhere.     Physical Exam   Temp: 98.7  F (37.1  C) Temp src: Oral BP: 136/70 Pulse: 99 Heart Rate: 94 Resp: 18 SpO2: 98 % O2 Device: Nasal cannula Oxygen Delivery: 2 LPM  Vitals:    05/10/19 1135 05/12/19 1638   Weight: 77.1 kg (169 lb 14.4 oz) 78.4 kg (172 lb 14.4 oz)     Vital Signs with Ranges  Temp:  [97.8  F (36.6  C)-99.3  F (37.4  C)] 98.7  F (37.1  C)  Pulse:  [99] 99  Heart Rate:  [] 94  Resp:  [18] 18  BP: (136-143)/(70-75) 136/70  SpO2:  [86 %-98 %] 98 %  I/O last 3 completed shifts:  In: 1240 [P.O.:1240]  Out: -     Dawson Coma Scale - Total 15/15    Constitutional: Awake, alert, cooperative, no apparent distress  Eyes: No icterus, lids and lashes normal  ENT: Mucous membranes moist; head normocephalic, atraumatic  Respiratory: No increased work of breathing, clear to auscultation bilaterally without crackles or wheezing  Cardiovascular: Regular rate and rhythm, S1/S2, no m/r/g; pedal pulses 2+, no pitting edema in the lower extremities  GI: Abdomen rounded, bowel sounds present. Slightly distended, soft and non-tender to palpation, no guarding   Genitourinary: No urine assessed  Skin: Skin color normal with ecchymosis of left flank and right groin   Musculoskeletal: No deformities  Neurologic: Awake, alert, oriented. No focal deficits. Strength 5/5 bilaterally; sensation intact bilaterally  Neuropsychiatric: Calm    PARMINDER Chaudhari CNP  To contact the trauma service use job code pager [phone],   Numeric texts or alpha text through [facility]

## 2019-05-15 LAB
ANION GAP SERPL CALCULATED.3IONS-SCNC: 7 MMOL/L (ref 3–14)
BUN SERPL-MCNC: 14 MG/DL (ref 7–30)
CALCIUM SERPL-MCNC: 7.8 MG/DL (ref 8.5–10.1)
CHLORIDE SERPL-SCNC: 102 MMOL/L (ref 94–109)
CO2 SERPL-SCNC: 25 MMOL/L (ref 20–32)
CREAT SERPL-MCNC: 0.6 MG/DL (ref 0.66–1.25)
GFR SERPL CREATININE-BSD FRML MDRD: >90 ML/MIN/{1.73_M2}
GLUCOSE BLDC GLUCOMTR-MCNC: 118 MG/DL (ref 70–99)
GLUCOSE BLDC GLUCOMTR-MCNC: 133 MG/DL (ref 70–99)
GLUCOSE BLDC GLUCOMTR-MCNC: 154 MG/DL (ref 70–99)
GLUCOSE BLDC GLUCOMTR-MCNC: 246 MG/DL (ref 70–99)
GLUCOSE SERPL-MCNC: 131 MG/DL (ref 70–99)
HGB BLD-MCNC: 8.6 G/DL (ref 13.3–17.7)
HGB BLD-MCNC: 8.8 G/DL (ref 13.3–17.7)
HGB BLD-MCNC: 9 G/DL (ref 13.3–17.7)
LACTATE BLD-SCNC: 1.1 MMOL/L (ref 0.7–2)
POTASSIUM SERPL-SCNC: 3.8 MMOL/L (ref 3.4–5.3)
SODIUM SERPL-SCNC: 135 MMOL/L (ref 133–144)

## 2019-05-15 PROCEDURE — 36415 COLL VENOUS BLD VENIPUNCTURE: CPT | Performed by: NURSE PRACTITIONER

## 2019-05-15 PROCEDURE — 12000001 ZZH R&B MED SURG/OB UMMC

## 2019-05-15 PROCEDURE — 36415 COLL VENOUS BLD VENIPUNCTURE: CPT | Performed by: SURGERY

## 2019-05-15 PROCEDURE — 25000132 ZZH RX MED GY IP 250 OP 250 PS 637: Performed by: NURSE PRACTITIONER

## 2019-05-15 PROCEDURE — 80048 BASIC METABOLIC PNL TOTAL CA: CPT | Performed by: NURSE PRACTITIONER

## 2019-05-15 PROCEDURE — 00000146 ZZHCL STATISTIC GLUCOSE BY METER IP

## 2019-05-15 PROCEDURE — 99232 SBSQ HOSP IP/OBS MODERATE 35: CPT | Performed by: NURSE PRACTITIONER

## 2019-05-15 PROCEDURE — 83605 ASSAY OF LACTIC ACID: CPT | Performed by: SURGERY

## 2019-05-15 PROCEDURE — 85018 HEMOGLOBIN: CPT | Performed by: NURSE PRACTITIONER

## 2019-05-15 PROCEDURE — 25000132 ZZH RX MED GY IP 250 OP 250 PS 637: Performed by: STUDENT IN AN ORGANIZED HEALTH CARE EDUCATION/TRAINING PROGRAM

## 2019-05-15 RX ORDER — POLYETHYLENE GLYCOL 3350 17 G/17G
17 POWDER, FOR SOLUTION ORAL DAILY
Qty: 14 PACKET | Refills: 0 | Status: SHIPPED | OUTPATIENT
Start: 2019-05-16 | End: 2019-09-05

## 2019-05-15 RX ORDER — METHOCARBAMOL 500 MG/1
500 TABLET, FILM COATED ORAL EVERY 6 HOURS PRN
Qty: 30 TABLET | Refills: 0 | Status: SHIPPED | OUTPATIENT
Start: 2019-05-15 | End: 2019-05-16

## 2019-05-15 RX ORDER — ACETAMINOPHEN 325 MG/1
650 TABLET ORAL EVERY 6 HOURS PRN
COMMUNITY
Start: 2019-05-15 | End: 2019-05-16

## 2019-05-15 RX ORDER — TAMSULOSIN HYDROCHLORIDE 0.4 MG/1
0.4 CAPSULE ORAL AT BEDTIME
Qty: 30 CAPSULE | Refills: 0 | Status: SHIPPED | OUTPATIENT
Start: 2019-05-15

## 2019-05-15 RX ORDER — LORATADINE 10 MG/1
10 TABLET ORAL DAILY
COMMUNITY
Start: 2019-05-16

## 2019-05-15 RX ORDER — LORATADINE 10 MG/1
10 TABLET ORAL DAILY
Status: DISCONTINUED | OUTPATIENT
Start: 2019-05-15 | End: 2019-05-16 | Stop reason: HOSPADM

## 2019-05-15 RX ORDER — TRIAMTERENE AND HYDROCHLOROTHIAZIDE 37.5; 25 MG/1; MG/1
1 TABLET ORAL DAILY
Status: DISCONTINUED | OUTPATIENT
Start: 2019-05-15 | End: 2019-05-16 | Stop reason: HOSPADM

## 2019-05-15 RX ORDER — POLYETHYLENE GLYCOL 3350 17 G/17G
17 POWDER, FOR SOLUTION ORAL DAILY
Status: DISCONTINUED | OUTPATIENT
Start: 2019-05-15 | End: 2019-05-16 | Stop reason: HOSPADM

## 2019-05-15 RX ADMIN — MELATONIN TAB 3 MG 3 MG: 3 TAB at 21:26

## 2019-05-15 RX ADMIN — Medication 1 HALF-TAB: at 14:55

## 2019-05-15 RX ADMIN — LORATADINE 10 MG: 10 TABLET ORAL at 15:49

## 2019-05-15 RX ADMIN — METHOCARBAMOL 500 MG: 500 TABLET, FILM COATED ORAL at 02:27

## 2019-05-15 RX ADMIN — TAMSULOSIN HYDROCHLORIDE 0.4 MG: 0.4 CAPSULE ORAL at 21:26

## 2019-05-15 RX ADMIN — LIDOCAINE 1 PATCH: 560 PATCH PERCUTANEOUS; TOPICAL; TRANSDERMAL at 21:25

## 2019-05-15 RX ADMIN — POLYETHYLENE GLYCOL 3350 17 G: 17 POWDER, FOR SOLUTION ORAL at 13:31

## 2019-05-15 RX ADMIN — SIMVASTATIN 20 MG: 10 TABLET, FILM COATED ORAL at 21:26

## 2019-05-15 ASSESSMENT — ACTIVITIES OF DAILY LIVING (ADL)
ADLS_ACUITY_SCORE: 13
ADLS_ACUITY_SCORE: 14
ADLS_ACUITY_SCORE: 14
ADLS_ACUITY_SCORE: 13
ADLS_ACUITY_SCORE: 14
ADLS_ACUITY_SCORE: 13

## 2019-05-15 NOTE — DISCHARGE SUMMARY
"[facility], [place]    Discharge Summary  Trauma Surgery Service    Date of Admission:  5/10/2019  Date of Discharge:  5/16/2019  Discharging Provider: Thais Goss CNP  Date of Service (when I saw the patient): 05/16/19    Primary Provider: Violet Guzman  Primary Care clinic: [facility] [address]   [place] [address]  Phone: [phone]  Fax number: [phone]     Discharge Diagnoses      Grade III splenic laceration  Acute blood loss anemia  Acute traumatic pain  Paralytic ileus  Benign prostatic hyperplasia     Hospital Course     Jayden Don is a 78 year old male who presents after a ground level fall that occurred while he was on his way to a 0730 appointment with his nurse practitioner for a pre-op evaluation for varicose veins. While at the appointment, he was examined by the NP and the patient reports that he was doing okay. Upon returning home at 0845, the pain continued. The pain is L flank pain that radiates to the abdomen. Rates it as 5/10 intensity now, \"radiating pain\" that comes and goes. He also reports feeling bloated. Patient reports no dizziness, tingling, numbness. No dizziness or lightheadedness prior to the fall. At this time, also notes left shoulder pain \"feels like I landed on it.\"   CT imaging of his abdomen and pelvis were concerning for a splenic laceration this his transfer here. Prior to transfer his hemoglobin dropped about 2.5 grams. He was given 1 unit of PRBC en route.  His hospital course and plan below.    Ground Level fall  The patient sustained the above injury as a result of fall.  He was seen by the trauma nurse practitioner and injury prevention education was performed.  The mechanism of injury and factors contributing to the accident were discussed with the patient.  Strategies on how to prevent future accidents were reviewed.  The patient underwent tertiary examination to evaluate for additional injuries.  The systematic " review did not find any other injuries.    Grade III splenic laceration  Jayden Don was urgently taken to Interventional Radiology on arrival for a diagnostic angiogram with subsequent embolization on 05/10/2019. See note below for details of the procedure. During his stay his hemoglobin was monitored closely. He had a repeat CT angiogram on 05/13 given concern for ongoing hemorrhage with the results below:  IMPRESSION:   1.  Grossly unchanged grade III splenic laceration status post  intravascular coiling of the splenic artery.   Adjacent perisplenic  hematoma has increased and now contains some foci of gas.  Left  abdominal retroperitoneal hemorrhage is not significantly changed.  There is no contrast extravasation to suggest active bleeding.  2. Increased small volume of hemoperitoneum in the lower abdomen and  pelvis since 5/10/2019.  3. Stable 3.7 subcentimeter right lower lobe opacity, indeterminate.   Short interval follow-up chest CT in 1 - 3 months is recommended if  prior imaging to document stability is unavailable.    4. Calcified pleural plaques bilaterally presumably related to  previous asbestos exposure.  5. Loculated fluid tracking along the left major fissure and  interlobular septal thickening in both lung bases suggestive of  pulmonary edema.  Increased left basilar subsegmental atelectasis  and/or consolidation.    His hemoglobin continued to drift requiring an additional unit of PRBC 05/14/19. At that time here was discussion for splenectomy if ongoing hemoglobin drop. Prior to discharge his hemoglobin was 8.9    He should also have a CT angiogram in about 2 weeks to evaluate for residual pseudoaneurysms. This was ordered at discharge.    He is recommended to follow up with a trauma provider in  3-4 weeks and with his primary care provider in 7-10days for ongoing medical management, glucose, heart rate, blood pressure and hemoglobin recheck as needed  He is recommended to avoid heavy  lifting, no NSAIDs or Aspirin  for 4 weeks, no contact sports for 3 months.    Acute blood loss anemia  Jayden Don was admitted with a hemoglobin of 14.3 . It is common for blood loss associated with this injury.  Our protocol is to keep hemoglobin >7.0 g/dL.  Over the course of his hospitalization. He has received 2 units of PRBC, and the most recent hemoglobin is 8.9.     Splenic Vaccination: Post Splenectomy or Spleen injury         Indications:            All patients status post splenectomy            All patients with <50% intact spleen          Pneumovax 0.5mL IM   Administered 05/16/19 prior to discharge from the hospital,                      --Pts will need a revaccination with PPSV in 5 years         Haemophilus vaccine (Hib) 0.5 mL IM     Administered 05/16/19 prior to discharge from the hospital,     --No revaccination needed         Meningococcal Vaccine (Menactra) 0.5 mL IM     Administered 05/16/19 prior to discharge from the hospital    Will need a second dose of meningococcal vaccine at least 2 months apart    --Revaccination every 5 years          References:  http://www.cdc.gov/mmwr/preview/mmwrhtml/gl4314m0.htm     Acute pain  Pain was controlled with a multi-modality approach.  The current regimen for Jayden Don includes the following, scheduled acetaminophen and PRN short acting opioids.  Adequate pain control was achieved with this regimen.  We anticipate that they will taper off this regimen over the next several weeks.    Acute pain  # Discharge Pain Plan:   - During his hospitalization, Jayden experienced pain due to splenic injury.  The pain plan for discharge was discussed with Jayden and the plan was created in a collaborative fashion.   - New Opoids: Oxycodone as needed for severe pain- 8 tabs   - Pharmacologic adjuvants:  Acetaminophen and low dose Oxycodone for severe pain only  Concern for ileus- resolved with bowel rest and an aggressive bowel regimen. He was able to  tolerate a diet and have bowel movements prior to discharge.  Therapy Recommendations:   Current status of physical therapies on discharge:   Home with assistance from the wife    Significant Results and Procedures   DATE: 05/10/2019  Procedure(s):  Angiogram with embolization  Surgeon(s): Surgeon(s) and Role: Riley Rubio MD  Interventional Radiology Brief Post Procedure Note     Procedure: IR VISCERAL ANGIOGRAM     Proceduralist: Luis Angel Grant MD     Assistant: IR Fellow Physician, Riley Rubio MD     Time Out: Prior to the start of the procedure and with procedural staff participation, I verbally confirmed the patient s identity using two indicators, relevant allergies, that the procedure was appropriate and matched the consent or emergent situation, and that the correct equipment/implants were available. Immediately prior to starting the procedure I conducted the Time Out with the procedural staff and re-confirmed the patient s name, procedure, and site/side. (The Joint Commission universal protocol was followed.)  Yes            Medications   Medication Event Details Admin User Admin Time   iodixanol (VISIPAQUE 320) injection 150 mL Medication Given Dose: 90 mL; Route: INTRA-ARTERIAL; Scheduled Time: 12:45 PM Howard Marquis ARRT 5/10/2019  2:11 PM   midazolam (VERSED) injection 0.5-2 mg Medication Given Dose: 1 mg; Route: Intravenous Tiara Turner RN 5/10/2019  2:13 PM   ketorolac (TORADOL) injection 30 mg Medication Given Dose: 30 mg; Route: Intravenous Tiara Turner RN 5/10/2019  2:14 PM   lidocaine 1 % 1-30 mL Medication Given by Other Dose: 10 mL; Route: Intradermal Tiara Turner RN 5/10/2019  2:14 PM   fentaNYL (PF) (SUBLIMAZE) injection 25-50 mcg Medication Given Dose: 100 mcg; Route: Intravenous Tiara Turner RN 5/10/2019  2:14 PM   heparin 5,000 units in 0.9% sodium chloride 1000 mL Medication Given Dose: 10,000 Units; Route: TABLE SOLN Tiara Turner RN 5/10/2019  2:15 PM          Sedation: IR Nurse Monitored Care   Post Procedure Summary:  Prior to the start of the procedure and with procedural staff participation, I verbally confirmed the patient s identity using two indicators, relevant allergies, that the procedure was appropriate and matched the consent or emergent situation, and that the correct equipment/implants were available. Immediately prior to starting the procedure I conducted the Time Out with the procedural staff and re-confirmed the patient s name, procedure, and site/side. (The Joint Commission universal protocol was followed.)  Yes                                        Sedatives: Fentanyl and Midazolam (Versed)     Vital signs, airway and pulse oximetry were monitored and remained stable throughout the procedure and sedation was maintained until the procedure was complete.  The patient was monitored by staff until sedation discharge criteria were met.     Patient tolerance: Patient tolerated the procedure well with no immediate complications.     Time of sedation in minutes: See dictation minutes from beginning to end of physician one to one monitoring.              Findings: Splenic artery extravasation resolved s/p particle and coil embolization     Estimated Blood Loss: Minimal     Fluoroscopy Time: 28.5 minute(s)     SPECIMENS: None     Complications: 1. None      Condition: Stable     Plan: Post op observation as ordered.     Comments: See dictated procedure note for full details.     Riley Rubio MD      Code Status   Full Code    SUBJECTIVE: Discussed discharge today with the patient and his wife. He states he was able to tolerate his diet, ambulated in the vogt and he had a small bowel movement today    Physical Exam   Temp: 97.6  F (36.4  C) Temp src: Oral BP: 134/60   Heart Rate: 89 Resp: 18 SpO2: 96 % O2 Device: Nasal cannula Oxygen Delivery: 1 LPM  Vitals:    05/10/19 1135 05/12/19 1638   Weight: 77.1 kg (169 lb 14.4 oz) 78.4 kg (172 lb 14.4 oz)      Vital Signs with Ranges  Temp:  [97.6  F (36.4  C)-100.3  F (37.9  C)] 97.6  F (36.4  C)  Heart Rate:  [] 89  Resp:  [16-18] 18  BP: (134-161)/(60-86) 134/60  SpO2:  [91 %-97 %] 96 %  I/O last 3 completed shifts:  In: 1020 [P.O.:1020]  Out: 200 [Urine:200]    [place] Coma Scale - Total 15/15     Constitutional: Awake, alert, cooperative, no apparent distress.  Eyes: Lids and lashes normal, pupils equal, round and reactive to light, extra ocular muscles intact, sclera clear, conjunctiva normal.  ENT: Normocephalic, atraumatic  Respiratory: No increased work of breathing, good air exchange, clear to auscultation bilaterally  Cardiovascular:  regular rate and rhythm, normal S1 and S2,  murmur.   GI: Rounded, soft to palpation, normal bowel sounds, abdomen soft,  non-tender, no guarding  Genitourinary:  Not seen  Skin:  Large left flank hematoma/ecchymosis, left upper arm ecchymotic, no redness, warmth, or swelling, no ecchymosis, no abrasions, and no jaundice.  Musculoskeletal: There is no redness, warmth, or swelling of the joints.  Pedal pulse palpated.  Neurologic: Awake, alert, oriented. Cranial nerves II-XII are grossly intact.  Strength and sensory is intact. No focal deficits.  Neuropsychiatric: Calm, normal eye contact, alert, affect appropriate to situation, oriented, thought process normal.    Discharge Disposition   Discharged to home  Condition at discharge: Stable  Discharge VS: Blood pressure 134/60, pulse 103, temperature 97.6  F (36.4  C), temperature source Oral, resp. rate 18, weight 78.4 kg (172 lb 14.4 oz), SpO2 96 %.    Consultations This Hospital Stay   INTERVENTIONAL RADIOLOGY ADULT/PEDS IP CONSULT  PHYSICAL THERAPY ADULT IP CONSULT  OCCUPATIONAL THERAPY ADULT IP CONSULT  VASCULAR ACCESS CARE ADULT IP CONSULT  VASCULAR ACCESS CARE ADULT IP CONSULT    Discharge Orders      CTA Abdomen Pelvis with Contrast     Reason for your hospital stay    Grade III splenic laceration     Activity     Your activity upon discharge: activity as tolerated  No heavy activity for 4 weeks.  No aspirin ibuprofen or naproxen for 4 weeks  No contact sports for 3 months  No ATV, snowmobile, motorcycles, jet skis or bicycles for 3 months  Do not drink alcohol while taking pain medications  No herbal supplements unless approved by your doctor     When to contact your care team    Call or seek emergency treatement if you have any of the following: sudden or increased left sided pain, fast heart rate,   increased shortness of breath, increased swelling, lightheaded, fever or dizziness.     Adult [facility] Follow-up and recommended labs and tests    Follow up with your primary care provider for continued medical care, blood sugar, blood pressure, heart rate and hospital follow up in 7-10 days    Medication Therapy Management Services  If you have any questions regarding your medications after discharge, this service is available to you.  Please call:  [phone] or [phone] (toll-free)  [facility]  [address] [place] [id number]  [email or web address]    Trauma Clinic in 3-4 weeks with Dr. Latif or other available trauma provider  [facility]  [address]  [address] [place] [address]   Appointments: [phone]    You have been involved in a recent trauma incident resulting in an injury.  Studies show us that people affected by trauma have higher levels of post-traumatic stress disorder (PTSD) and/or depressive symptoms during the year following an injury.     Please consider the following.  Have you:  Had migraines about the event(s) or thought about the event(s) when you didn't want to?  Tried hard not to think about the event(s) or went out of your way to avoid situations that reminded you of the event(s)?  Been constantly on guard, watchful, or easily startled?  Felt numb or detached from people, activities, or your surroundings?   Felt  "guilty or unable to stop blaming yourself or others for the event(s) or any problems the event (s) may have caused?    If you answered \"yes\" to 3 or more of these questions, or if you simply want to discuss any of your feelings further, we recommend that you talk with your Primary Care Provider or a mental health professional.      Appointments on [place] and/or [facility] (with [facility] or [facility] provider or service). Call [phone] if you haven't heard regarding these appointments within 7 days of discharge.     Diet    Follow this diet upon discharge: Orders Placed This Encounter      Advance Diet as Tolerated: Regular Diet Adult     Discharge Medications   Current Discharge Medication List      START taking these medications    Details   acetaminophen (TYLENOL) 325 MG tablet Take 3 tablets (975 mg) by mouth every 8 hours as needed for mild pain    Associated Diagnoses: Laceration of spleen, initial encounter      loratadine (CLARITIN) 10 MG tablet Take 1 tablet (10 mg) by mouth daily    Associated Diagnoses: Seasonal allergic rhinitis due to pollen      oxyCODONE (ROXICODONE) 5 MG tablet Take 0.5 tablets (2.5 mg) by mouth every 8 hours as needed for severe pain  Qty: 8 tablet, Refills: 0    Associated Diagnoses: Laceration of spleen, initial encounter      polyethylene glycol (MIRALAX/GLYCOLAX) packet Take 17 g by mouth daily  Qty: 14 packet, Refills: 0    Associated Diagnoses: Laceration of spleen, initial encounter      tamsulosin (FLOMAX) 0.4 MG capsule Take 1 capsule (0.4 mg) by mouth At Bedtime  Qty: 30 capsule, Refills: 0    Associated Diagnoses: Benign prostatic hyperplasia without lower urinary tract symptoms         CONTINUE these medications which have NOT CHANGED    Details   ANDROGEL 50 MG/5GM TD PACK 1 pack topically q day  Qty: 30, Refills: 11    Comments: last fill date 11/3/03      GLUCOSAMINE-CHONDROITIN 750-600 MG OR TABS 1 tablet at bedtime      LOTRISONE 1-0.05 % EX CREA apply as " directed  Qty: 30 gm tube, Refills: 3    Associated Diagnoses: Dermatophytosis of foot      TRIAMTERENE-HCTZ 37.5-25 MG OR CAPS 1 CAPSULE DAILY  Qty: 60, Refills: 0      VIAGRA 100 MG OR TABS 1 TABLET BY MOUTH AS DIRECTED  Qty: 6, Refills: 6      VITAMIN E 800 IU OR CAPS 1 tablet daily      ZITHROMAX Z-CHETAN 250 MG OR CAPS 2 tablets day 1, 1 tablet days 2-5  Qty: 6, Refills: 0         STOP taking these medications       ASPIRIN 325 MG OR TBEC Comments:   Reason for Stopping:             Allergies   Allergies   Allergen Reactions     Crestor [Rosuvastatin] Other (See Comments)     Joint Pain     No Known Drug Allergies      Data   Most Recent 3 CBC's:  Recent Labs   Lab Test 05/16/19  0636 05/15/19  1414 05/15/19  0655  05/14/19  0631 05/13/19  0654   WBC 12.3*  --   --   --  14.3* 15.3*   HGB 8.9* 9.0* 8.8*   < > 7.1* 8.1*   MCV 89  --   --   --  92 91   *  --   --   --  86* 66*    < > = values in this interval not displayed.      Most Recent 3 BMP's:  Recent Labs   Lab Test 05/16/19  0636 05/15/19  0655 05/14/19  0631   * 135 132*   POTASSIUM 3.4 3.8 3.7   CHLORIDE 96 102 101   CO2 26 25 26   BUN 12 14 14   CR 0.53* 0.60* 0.60*   ANIONGAP 7 7 5   VOLODYMYR 7.9* 7.8* 7.7*   * 131* 163*     Most Recent 2 LFT's:No lab results found.  Most Recent INR's and Anticoagulation Dosing History:  Anticoagulation Dose History     Recent Dosing and Labs Latest Ref Rng & Units 5/10/2019 5/10/2019 5/10/2019 5/14/2019    INR 0.86 - 1.14 0.98 1.08 1.08 1.13        Most Recent 3 Troponin's:No lab results found.  Most Recent 6 Bacteria Isolates From Any Culture (See EPIC Reports for Culture Details):No lab results found.  Most Recent TSH, T4 and A1c Labs:  Recent Labs   Lab Test 05/11/19  0348   A1C 7.6*     Results for orders placed or performed during the hospital encounter of 05/10/19   IR Visceral Angiogram    Narrative    Procedures 5/10/2019:  1. Ultrasound guidance for needle access.  2. Celiac and splenic artery  catheterizations and angiograms.  3. Splenic artery segmental artery catheterization, angiogram with  particle and coil embolization.  4. Minx device deployment for right common femoral arteriotomy  closure.    History: Splenic laceration with active hemorrhage.    Comparison: CT 5/10/2018    Staff: Fredi Benson M.D.    Fellow: Riley Rubio M.D.    I, FREDI BENSON MD, attest that I was present in the procedure room for  the entire procedure.    Medications:   1. Fentanyl 100 mcg IV  2. Versed 1 mg IV   3. 30 mg Toradol  4. 8 cc 1% lidocaine.    Moderate sedation administered by the IR nurse at the supervision of  the attending. Vital signs and oxygenation continuously monitored. The  patient remained stable throughout the procedure.    Attending face-to-face time: 100 minutes of direct face-to-face  evaluation    Fluoroscopy time: 20.5 minutes.    Fluoroscopy dose (air kerma): 2607 mGy    Contrast: 90 ml Visipaque 320    Findings/procedure:    Prior to the procedure, both verbal and written informed consent  obtained from the patient. Patient placed in the supine position on  the interventional table. The right inguinal region was prepped and  draped in the usual sterile fashion. Timeout was performed.    Fluoroscopy was used to glenda the right femoral head. Ultrasound used  to identify a patent right common femoral artery. 1% lidocaine was  used for local anesthesia. Under direct ultrasound guidance a 21-gauge  micropuncture needle was used to access the patent right common  femoral artery.    Ultrasound image documenting right common femoral arterial patency and  needle arteriotomy was saved in the patient's record.    Needle was exchanged over an 0.018 wire for a micropuncture sheath and  ultimately an 0.035 wire and a 5 Khmer Cordis Brite Tip sheath.    Celiac artery was catheterized with a C2 glide catheter and a 0.035  angled Glidewire. Celiac artery angiogram was performed. This  demonstrated conventional  celiac artery anatomy and puddling of  contrast in the region of the splenic parenchyma.    The catheter was advanced to the mid splenic artery. Focal dissection  was noted with a small amount of extravasation. Base catheter could  not be advanced beyond this point. Progreat microcatheter and wire  were advanced to the more distal splenic artery and angiograms were  performed in multiple obliquities. This demonstrated at least 2 areas  of extravasation. Very distal catheterization was not technically  feasible. Both a 018 V18  wire and a 014 Synchro 2 wires were used in  an attempt to exchange the base catheter were advanced the  microcatheter which were unsuccessful. Progreat microwire and catheter  were then placed in the segmental branch of the splenic artery.  Particle embolization was performed using a Gelfoam slurry. This was  performed to 4 beat stasis with notable pruning of the splenic  arterial tree. Angiogram through the base catheter demonstrated region  of vessel irregularity and contrast puddling. 3 3/2 Tornado coils were  placed in the region of this vessel irregularity contrast puddling.  Angiogram performed through the base catheter. No extravasation was  visualized.    Catheters were removed. Limited right iliofemoral angiography was  performed demonstrating adequate puncture site for closure device  deployment and no immediate consultation.    MYNX-  closure device was deployed in comminution manual  compression. A small hematoma developed and an additional 10 minutes  of manual compression were applied and hemostasis obtained. Hematoma  compressed away.    The patient tolerated the procedure well. No immediate complication.      Impression    Impression:  1. Splenic artery catheterization and angiogram demonstrating active  extravasation. Active extravasation resolved status post particle and  coil embolization.    2. Small iatrogenic splenic artery dissection which was not  flow-limiting.  This will likely be self-limited and of no clinical  consequence.    Plan:   - Postoperative care as ordered with 6 hours of right straight leg  bedrest.    - CT angiogram of the abdomen in days to weeks to evaluate for  residual pseudoaneurysms.    I have personally reviewed the examination and initial interpretation  and I agree with the findings.    FREDI BENSON MD   XR Abdomen Port 1 View    Narrative    Examination:  XR ABDOMEN PORT 1 VW 5/11/2019 10:37 PM     Comparison: 5/10/2010    History: abdominal distension    Technique: Supine radiograph of the abdomen    Findings:   Metallic radiopacity left upper quadrant. Calcifications along the  right greater than left pleura, stable. The visualized air filled  small intestine and colon are not dilated. There is no free air.  No  pneumatosis. No portal venous gas.The lung bases are unremarkable.       Impression    Impression:  Non obstructed bowel gas pattern    I have personally reviewed the examination and initial interpretation  and I agree with the findings.    DAE MARINELLI MD   XR Chest Port 1 View    Narrative    Exam: XR CHEST PORT 1 VW, 5/12/2019 8:39 AM    Indication: shortness of breath    Comparison: Chest x-ray 5/10/2019    Findings:   Upright AP radiograph of the chest. Cardiomediastinal silhouette is  normal in size. Pulmonary vasculature is within normal limits. No  pneumothorax. Streaky bilateral perihilar and bibasilar opacities and  low lung volumes. Calcified pleural plaques. No pneumothorax. The  upper abdomen is unremarkable.      Impression    Impression:   1. Streaky bilateral perihilar and bibasilar opacities, favored to  represent atelectasis in the setting of low lung volumes. Differential  also includes infection.  2. Bilateral calcified pleural plaques, which may be related to prior  asbestos exposure.    I have personally reviewed the examination and initial interpretation  and I agree with the findings.    DAE MARINELLI MD    US Lower Extremity Venous Duplex Bilateral    Narrative    EXAMINATION: DOPPLER VENOUS ULTRASOUND OF BILATERAL LOWER EXTREMITIES,  5/12/2019 1:15 PM     COMPARISON: None.    HISTORY: Trauma. Fall. Evaluate for DVT.    TECHNIQUE:  Gray-scale evaluation with compression, spectral flow and  color Doppler assessment of the deep venous system of both legs from  groin to knee, and then at the ankles.    FINDINGS:  In both lower extremities, the common femoral, femoral, popliteal and  posterior tibial veins demonstrate normal compressibility and blood  flow.    Incidentally noted and partially visualized right suprapatellar  effusion.  Similar appearing effusion in the left suprapatellar  region.      Impression    IMPRESSION:    1.  No evidence of deep venous thrombosis in either lower extremity.  2.  Incidentally noted bilateral suprapatellar effusions.    CASE JARVIS MD   CTA Abdomen Pelvis with Contrast     Value    Radiologist flags Lung nodule    Narrative    EXAMINATION: CTA ABDOMEN PELVIS WITH CONTRAST, 5/12/2019 2:35 PM    TECHNIQUE:  Helical CT images from the lung bases through the  symphysis pubis were obtained before and after the administration of  IV contrast. Delayed images were obtained.  Source images reviewed as  well as 3D and multi-planar reconstructions at a 3D workstation.    Contrast dose: iopamidol (ISOVUE-370) solution 100 mL      COMPARISON: CT abdomen pelvis 5/10/2019    HISTORY: Abdomen-pelvis trauma, minor, blunt; concern for ongoing  splenic bleeding    FINDINGS:    Abdomen and pelvis: Redemonstration of the ruptured spleen with an  adjacent left retroperitoneal hematoma, with several splenic artery  embolization coils that are new since the prior exam on 5/10/2019.   Moderate perisplenic hematoma remains, mildly increased since the  comparison and now with some tiny foci of gas.  There is no  extravasation of contrast to suggest active bleeding. Mild fatty  infiltration of the liver. No  focal liver lesions. Trace perihepatic  ascites. Fatty atrophy of the pancreas. Cortical defects in both  kidneys, likely related to prior insult from infarction or infection.  No renal stones or hydronephrosis. Urinary bladder is unremarkable.  Hyperdense fluid tracking along the left paracolic gutter into the  pelvis has increased since 5/10/2019. The small and large bowel are  normal in caliber. Colonic diverticulosis, without evidence of acute  diverticulitis. Fat-containing left inguinal hernia, containing  dependent hyperdense fluid. Tiny fat-containing umbilical hernia.  Atherosclerotic calcifications of the abdominal aorta, without  aneurysmal dilatation. There are no enlarged inguinal or  intra-abdominal lymph nodes. Hyperdense sludge and/or vicarious  excretion of contrast.  No gallbladder wall thickening.    Aortobiiliac atherosclerotic calcification as well as noncalcified  plaque without aneurysmal dilation. Patent celiac axis, superior  mesenteric artery, and inferior mesenteric artery. Patent renal  arteries.    Lung bases: Heart is normal in size. No pericardial effusion. Coronary  artery calcifications. Calcified pleural plaques bilaterally. There is  a 3.7 x 1.4 cm consolidation in the posterior medial right lower lobe.  Fluid tracking along the fissures and mild intralobular septal  thickening in both lung bases.  Left basilar atelectasis/consolidation  has increased.    Bones and soft tissues: Degenerative changes of the spine, with disc  space narrowing and intradiscal gas at L5-S1. No acute osseous  abnormalities or suspicious osseous lesions.      Impression    IMPRESSION:   1.  Grossly unchanged grade III splenic laceration status post  intravascular coiling of the splenic artery.   Adjacent perisplenic  hematoma has increased and now contains some foci of gas.  Left  abdominal retroperitoneal hemorrhage is not significantly changed.  There is no contrast extravasation to suggest active  bleeding.  2. Increased small volume of hemoperitoneum in the lower abdomen and  pelvis since 5/10/2019.  3. Stable 3.7 subcentimeter right lower lobe opacity, indeterminate.   Short interval follow-up chest CT in 1 - 3 months is recommended if  prior imaging to document stability is unavailable.    4. Calcified pleural plaques bilaterally presumably related to  previous asbestos exposure.  5. Loculated fluid tracking along the left major fissure and  interlobular septal thickening in both lung bases suggestive of  pulmonary edema.  Increased left basilar subsegmental atelectasis  and/or consolidation.  6.  Additional incidental and unchanged findings as above.    [Recommend Follow Up: Lung nodule]    This report will be copied to the [facility] to ensure a  provider acknowledges the finding.     I have personally reviewed the examination and initial interpretation  and I agree with the findings.    CASE JARVIS MD   XR Abdomen Port 1 View    Narrative    Exam: XR ABDOMEN PORT 1 VW, 5/12/2019 6:05 PM    Indication: check ng placement    Comparison: CT abdomen pelvis 5/12/2019 at 2:23 PM    Findings:   Portable supine radiograph the abdomen. Gastric tube tip and sidehole  project over the stomach. Nonobstructive bowel gas pattern. Residual  contrast in the urinary bladder.    Embolization coil material  projects over left upper quadrant.      Impression    Impression: Nasogastric tube tip and sidehole project over the  stomach.    I have personally reviewed the examination and initial interpretation  and I agree with the findings.    CASE JARVIS MD       Time Spent on this Encounter   I, Thais Goss, personally saw the patient today and spent less than or equal to 30 minutes discharging this patient.    We appreciate the opportunity to care for your patient while in the hospital.  Should you have any questions about their injuries or this discharge summary our contact information is below.    Trauma  Services  [facility]   Department of Critical Care and Acute Care Surgery  [address] [place] [address] [address]  [place] [address]  Office: [phone]

## 2019-05-15 NOTE — PLAN OF CARE
1930-2330    ../71 (BP Location: Left arm)   Pulse 103   Temp 98.8  F (37.1  C) (Oral)   Resp 18   Wt 78.4 kg (172 lb 14.4 oz)   SpO2 98%   BMI 30.63 kg/m        ..Activity: up to commode with standby assist   Neuros: alert and orientated x 4, calm and cooperative   Cardiac: tachy,   Respiratory: some shortness of breath with exertion, 93-98% on 2 L NC   GI:  abdomen distended/tender   : voiding spont (jaswinder urine)   Diet: NPO except ice and sips with meds   Skin: right abdomen/flank with large bruise, groin puncture site intact   Lines: PIV   Incisions/Drains: no drains, right groin puncture site CDI   Labs: pending   Pain/nausea: no nausea, denied need for pain meds   New changes this shift: got 1 unit of blood on days for hgb of 7.1, hgb 8/6 after blood   Plan:  hgb in AM, possible intervention if hgb continues to decrease

## 2019-05-15 NOTE — PLAN OF CARE
/79 (BP Location: Left arm)   Pulse 103   Temp 99.1  F (37.3  C) (Oral)   Resp 18   Wt 78.4 kg (172 lb 14.4 oz)   SpO2 97%   BMI 30.63 kg/m      Time: 5614-9181     Reason for admission: Fall w/ splenic laceration.   Vitals: Afeb, slightly tachy, OVSS. O2 sats 97% on 2L NC.  Activity: Up w/ SBA.   Pain: Reports pain on L abd/side. Given Robaxin x1.   Neuro: A&Ox3. Calls appropriately. CMS intact. Denies N/T.   Cardiac: Slightly tachy.   Respiratory: LS clear & dim. Denies SOB.   GI: Abd distended, + BS, + gas, no BM. LBM 5/14.   : Voiding adequately at [facility] [place].   Diet: NPO. Denies N/V.   Incisions/Drains: R groin incision CDI. Bruising noted around site and on L abd/hip.    IV Access: PIV SL.   Labs/Imaging:      New changes this shift: None.     Plan: NPO for possible splenectomy pending hgb.      Continue to monitor and follow POC

## 2019-05-15 NOTE — PLAN OF CARE
Vitals:    05/14/19 1900 05/14/19 2235 05/15/19 0437 05/15/19 0825   BP: 143/71 145/79 170/88 141/78   BP Location: Left arm Left arm Right arm Right arm   Pulse: 103      Resp: 18 18 18 18   Temp: 98.8  F (37.1  C) 99.1  F (37.3  C)  99  F (37.2  C)   TempSrc: Oral Oral  Oral   SpO2: 98% 97% 96% 91%   Weight:       Afebrile BP stable tachycardic, sating 92% on room air, belly distended,   +BS voiding adequate, left hip bruised, right groin bruise, blood glucose 131 this AM, Hgb 8.8, recheck 9.0 up and ambulated, started clear tolerating good, and tolerating regular, triggered lactic,   Denies pain, decline tylenol, wife is at bed side, continue with plan of care.   Unknown if ever smoked

## 2019-05-15 NOTE — PROGRESS NOTES
[facility], [place]  Trauma Service Progress Note    Date of Service (when I saw the patient): 05/15/2019     Assessment & Plan   Trauma Mechanism: Ground level fall  Known Injuries:  1. Grade III splenic injury with extravasation     Other diagnoses:  1. Acute traumatic pain  2. Concern for ileus  3. Acute blood loss anemia  4. Leukocytosis  5. DM2  6. HTN  7. BPH  8. Calcified pleural plaques, right lower lobe focal density noted on CT chest - previously disclosed to patient  9. Left flank hematoma     Procedure(s): splenic embolization 5/10/19     Changes for Today:  Transfuse 1 unit PRBCs  Hemoglobin q8 hours after transfusion (scheduled for 1700)  NPO     Plan:  1. Admitted to the trauma service. Teritary survey completed 5/11/19 with no additional injuries identified.     2. Splenic laceration s/p embolization on 5/11/19  3. Acute blood loss anemia  ? Repeat CT abdomen/pelvis 5/12/19 with increase perisplenic hematoma with foci of gas, small volume hemoperitoneum in lower abdomen increased  ? Received 1 unit PRBC with hemoglobin drop, 8.6 post transfusion, 8.8 today. Recheck later today and in the morning  ? Discussed with IR today if further drop in hemoglobin will be in favor of a repeat angiogram with embolization  ? Holding chemical DVT prophylaxis for now, ambulate in vogt, SCD's in bed  ? Repeat CT angiogram in 1-2 weeks as outpatient to evaluate for residual pseudoaneurysms, needs to be ordered at discharge  ? Left flank hematoma: not evident day 1 and 2 of hospitalization, patient reports delayed bruising at baseline, CT angiogram 05/13 with unchanged left abdominal retroperitoneal hemorrhage and no contrast extravasation to suggest active bleeding  ? Follow-up at discharge TBD  ? Will need splenectomy vaccinations prior to discharge:            Splenic Vaccination: Post Splenectomy or Spleen injury         Indications:            All patients status post splenectomy             All patients with <50% intact spleen          Pneumovax 0.5mL IM   Will need to be done prior to discharge from the hospital,                      --Pts will need a revaccination with PPSV in 5 years         Haemophilus vaccine (Hib) 0.5 mL IM     Will need to be done prior to discharge from the hospital,     --No revaccination needed         Meningococcal Vaccine (Menactra) 0.5 mL IM     Will need to be done prior to discharge from the hospital    Will need a second dose of meningococcal vaccine at least 2 months apart    --Revaccination every 5 years          References:  http://www.cdc.gov/mmwr/preview/mmwrhtml/ag9190p6.htm     4. Concern for ileus  ? Less distended, denies nausea, states he feels hungry, +flatus  ? Last BM Monday per patient, will add Miralax  Daily, OK to resume diet today.     5. Acute traumatic pain  ? Scheduled acetaminophen and lidocaine patches  ? Methocarbamol available prn  ? Minimizing narcotics given concern for ileus     6. Respiratory:   ? 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 Received a dose of Lasix yesterday given concern for pulmonary edema, 1000ml output x 24 hours, weaned to room air today. Hold on any intravenous hydration, OK to resume diet,  ? Ambulate in vogt TID  ? Pulmonary hygiene with IS     7. DM2 and stress induced hyperglycemia  Sliding scale insulin + added carb coverage    8. HTN  ? Resume home triamterene-hydrochlorothiazide given elevated BP's  ? Plan reviewed with wife and patient     General Cares:   PPI/H2 blocker: none indicated  DVT prophylaxis: SCDs  Bowel regimen: senna and miralax  Pulmonary toilet: IS, OOB  Lines / drains: PIV     Code status:  Full code              Discharge goals:     Adequate pain management: yes    VSS x24 hours: yes    Hemoglobin stable x 48 hours: ongoing    Ambulating safely and/or therapy evals complete: yes    Drains/lines removed or plan in place to  manage: PIV  Expected D/C date: Thursday vs Friday pending stable hemoglobin, improvement in resp status    Interval History   States he is hungry and would like to eat. Reports minimal left upper and left flank pain. Denies nausea or vomiting. Mild dyspnea on exertion, on room air.  ROS x 8 negative with exception of those things listed in interval hx    Physical Exam   Temp: 99  F (37.2  C) Temp src: Oral BP: 141/78 Pulse: 103 Heart Rate: 103 Resp: 18 SpO2: 91 % O2 Device: None (Room air) Oxygen Delivery: 2 LPM  Vitals:    05/10/19 1135 05/12/19 1638   Weight: 77.1 kg (169 lb 14.4 oz) 78.4 kg (172 lb 14.4 oz)     Vital Signs with Ranges  Temp:  [98.4  F (36.9  C)-100.4  F (38  C)] 99  F (37.2  C)  Pulse:  [103] 103  Heart Rate:  [] 103  Resp:  [18-20] 18  BP: (128-170)/(67-88) 141/78  SpO2:  [91 %-98 %] 91 %  I/O last 3 completed shifts:  In: 30 [P.O.:30]  Out: 1270 [Urine:1270]    Naz Coma Scale - Total 15/15    Constitutional: Awake, alert, cooperative, no apparent distress.  Eyes: Lids and lashes normal, pupils equal, round and reactive to light, extra ocular muscles intact, sclera clear, conjunctiva normal.  ENT: Normocephalic, atraumatic  Respiratory: No increased work of breathing, good air exchange, clear to auscultation bilaterally  Cardiovascular:  regular rate and rhythm, normal S1 and S2,  murmur.   GI: Rounded, soft to palpation, normal bowel sounds, abdomen soft,  non-tender, no guarding  Genitourinary:  Not seen  Skin:  Large left flank hematoma/ecchymosis, left upper arm ecchymotic, no redness, warmth, or swelling, no ecchymosis, no abrasions, and no jaundice.  Musculoskeletal: There is no redness, warmth, or swelling of the joints.  Pedal pulse palpated.  Neurologic: Awake, alert, oriented. Cranial nerves II-XII are grossly intact.  Strength and sensory is intact. No focal deficits.  Neuropsychiatric: Calm, normal eye contact, alert, affect appropriate to situation, oriented, thought  process normal.    PARMINDER Napoles CNP  To contact the trauma service use job code pager [phone],   Numeric texts or alpha text through [facility]

## 2019-05-16 VITALS
RESPIRATION RATE: 18 BRPM | DIASTOLIC BLOOD PRESSURE: 60 MMHG | SYSTOLIC BLOOD PRESSURE: 134 MMHG | TEMPERATURE: 97.6 F | HEART RATE: 103 BPM | WEIGHT: 172.9 LBS | BODY MASS INDEX: 30.63 KG/M2 | OXYGEN SATURATION: 96 %

## 2019-05-16 LAB
ANION GAP SERPL CALCULATED.3IONS-SCNC: 7 MMOL/L (ref 3–14)
BUN SERPL-MCNC: 12 MG/DL (ref 7–30)
CALCIUM SERPL-MCNC: 7.9 MG/DL (ref 8.5–10.1)
CHLORIDE SERPL-SCNC: 96 MMOL/L (ref 94–109)
CO2 SERPL-SCNC: 26 MMOL/L (ref 20–32)
CREAT SERPL-MCNC: 0.53 MG/DL (ref 0.66–1.25)
ERYTHROCYTE [DISTWIDTH] IN BLOOD BY AUTOMATED COUNT: 13.8 % (ref 10–15)
GFR SERPL CREATININE-BSD FRML MDRD: >90 ML/MIN/{1.73_M2}
GLUCOSE BLDC GLUCOMTR-MCNC: 147 MG/DL (ref 70–99)
GLUCOSE BLDC GLUCOMTR-MCNC: 152 MG/DL (ref 70–99)
GLUCOSE BLDC GLUCOMTR-MCNC: 251 MG/DL (ref 70–99)
GLUCOSE SERPL-MCNC: 162 MG/DL (ref 70–99)
HCT VFR BLD AUTO: 26.9 % (ref 40–53)
HGB BLD-MCNC: 8.9 G/DL (ref 13.3–17.7)
MCH RBC QN AUTO: 29.6 PG (ref 26.5–33)
MCHC RBC AUTO-ENTMCNC: 33.1 G/DL (ref 31.5–36.5)
MCV RBC AUTO: 89 FL (ref 78–100)
PLATELET # BLD AUTO: 102 10E9/L (ref 150–450)
POTASSIUM SERPL-SCNC: 3.4 MMOL/L (ref 3.4–5.3)
RBC # BLD AUTO: 3.01 10E12/L (ref 4.4–5.9)
SODIUM SERPL-SCNC: 130 MMOL/L (ref 133–144)
WBC # BLD AUTO: 12.3 10E9/L (ref 4–11)

## 2019-05-16 PROCEDURE — 25000128 H RX IP 250 OP 636: Performed by: NURSE PRACTITIONER

## 2019-05-16 PROCEDURE — 36415 COLL VENOUS BLD VENIPUNCTURE: CPT | Performed by: NURSE PRACTITIONER

## 2019-05-16 PROCEDURE — 00000146 ZZHCL STATISTIC GLUCOSE BY METER IP

## 2019-05-16 PROCEDURE — 90648 HIB PRP-T VACCINE 4 DOSE IM: CPT | Performed by: NURSE PRACTITIONER

## 2019-05-16 PROCEDURE — 85027 COMPLETE CBC AUTOMATED: CPT | Performed by: NURSE PRACTITIONER

## 2019-05-16 PROCEDURE — 80048 BASIC METABOLIC PNL TOTAL CA: CPT | Performed by: NURSE PRACTITIONER

## 2019-05-16 PROCEDURE — 90734 MENACWYD/MENACWYCRM VACC IM: CPT | Performed by: NURSE PRACTITIONER

## 2019-05-16 PROCEDURE — 25000581 ZZH RX MED A9270 GY (STAT IND- M) 250: Performed by: NURSE PRACTITIONER

## 2019-05-16 PROCEDURE — 99238 HOSP IP/OBS DSCHRG MGMT 30/<: CPT | Performed by: NURSE PRACTITIONER

## 2019-05-16 PROCEDURE — 90670 PCV13 VACCINE IM: CPT | Performed by: NURSE PRACTITIONER

## 2019-05-16 PROCEDURE — 25000132 ZZH RX MED GY IP 250 OP 250 PS 637: Performed by: NURSE PRACTITIONER

## 2019-05-16 RX ORDER — OXYCODONE HYDROCHLORIDE 5 MG/1
2.5 TABLET ORAL EVERY 8 HOURS PRN
Qty: 8 TABLET | Refills: 0 | Status: ON HOLD | OUTPATIENT
Start: 2019-05-16 | End: 2019-05-28

## 2019-05-16 RX ORDER — ACETAMINOPHEN 325 MG/1
975 TABLET ORAL EVERY 8 HOURS PRN
COMMUNITY
Start: 2019-05-16 | End: 2020-11-13

## 2019-05-16 RX ADMIN — Medication 1 HALF-TAB: at 09:11

## 2019-05-16 RX ADMIN — Medication 0.5 ML: at 12:00

## 2019-05-16 RX ADMIN — POLYETHYLENE GLYCOL 3350 17 G: 17 POWDER, FOR SOLUTION ORAL at 09:01

## 2019-05-16 RX ADMIN — PNEUMOCOCCAL 13-VALENT CONJUGATE VACCINE 0.5 ML: 2.2; 2.2; 2.2; 2.2; 2.2; 4.4; 2.2; 2.2; 2.2; 2.2; 2.2; 2.2; 2.2 INJECTION, SUSPENSION INTRAMUSCULAR at 11:58

## 2019-05-16 RX ADMIN — METHOCARBAMOL 500 MG: 500 TABLET, FILM COATED ORAL at 00:31

## 2019-05-16 RX ADMIN — HAEMOPHILUS B POLYSACCHARIDE CONJUGATE VACCINE FOR INJ 0.5 ML: RECON SOLN at 12:01

## 2019-05-16 RX ADMIN — METHOCARBAMOL 500 MG: 500 TABLET, FILM COATED ORAL at 09:11

## 2019-05-16 ASSESSMENT — ACTIVITIES OF DAILY LIVING (ADL)
ADLS_ACUITY_SCORE: 13

## 2019-05-16 NOTE — PLAN OF CARE
1930-0730    ../86 (BP Location: Left arm)   Pulse 103   Temp 98.6  F (37  C) (Oral)   Resp 18   Wt 78.4 kg (172 lb 14.4 oz)   SpO2 97%   BMI 30.63 kg/m        ..Activity: up ad laith   Neuros: alert and orientated x 4, calm and cooperative   Cardiac: tachy at times (100-104)   Respiratory: O2 sats 90% on RA, 96% on 1 L NC, some dyspnea with exertion   GI:  abdomen distended, tolerating water overnight, passing gas, but no BM this shift   : voiding spont (not saving)   Diet: regular   Skin: right groin puncture site CDI, bruising through out bilateral hip/groin  areas  Lines: PIV   Incisions/Drains: right groin puncture site, no drains   Labs: pending   Pain/nausea: abdominal discomfort, but refusing tylenol overnight, has lido patch to left hip, no nausea   New changes this shift: none   Plan: continue POC

## 2019-05-16 NOTE — PLAN OF CARE
Time: 6751-4010  Reason for admission: Spleen Injury   VS: /60 (BP Location: Left arm)   Pulse 103   Temp 97.6  F (36.4  C) (Oral)   Resp 18   Wt 78.4 kg (172 lb 14.4 oz)   SpO2 96%   BMI 30.63 kg/m     Activity: up ad laith  Neuros: AA & Ox4  Cardiac: WDL  Respiratory: denies SOB, lungs diminished in the bases  GI/: +bS, passing flatus, 1 soft medium BM, voiding adequately  Diet: tolerating diet  Skin: bruising on the right and left hip  Lines: PIV discontinued   Labs: hgb-8.9,   New changes this shift: discharged home, discharge instructions explained, reports understanding instructions, IM injections, education on IM administration given, spouse at the bedside  follow POC.

## 2019-05-17 ENCOUNTER — TELEPHONE (OUTPATIENT)
Dept: SURGERY | Facility: CLINIC | Age: 79
End: 2019-05-17

## 2019-05-17 ENCOUNTER — EXTERNAL ORDER RESULTS (OUTPATIENT)
Dept: SURGERY | Facility: CLINIC | Age: 79
End: 2019-05-17

## 2019-05-17 DIAGNOSIS — S36.039D LACERATION OF SPLEEN, SUBSEQUENT ENCOUNTER: Primary | ICD-10-CM

## 2019-05-17 NOTE — TELEPHONE ENCOUNTER
----- Message from Jamilah Kumar RN sent at 5/17/2019  7:53 AM CDT -----  Татьяна,    Please contact patient and arrange a follow up clinic visit with a Trauma Provider in 3-4 weeks. He will need a lab visit first.  DX:  Splenic laceration    Please let me know when this is scheduled so that I can add the Hgb order.    Thanks,  Jamilah  
Per task, this writer called the patient to discuss scheduling an appointment for him to see a general surgeon for a hospital follow up This writer spoke to the patient's wife and she let this writer know their paperwork said Dr. Ivan Latif. This writer let her know that any trauma provider would be appropriate. She expressed understanding. This writer confirmed appointment on June / 04 / 2019 at 10:30 AM with Dr. Ivan Latif. This writer also scheduled a lab appointment at 10:00 am. She agreed to this plan. This information will be routed to the general surgery nurse as requested.  
Yes

## 2019-05-23 ENCOUNTER — DOCUMENTATION ONLY (OUTPATIENT)
Dept: CARE COORDINATION | Facility: CLINIC | Age: 79
End: 2019-05-23

## 2019-05-24 ENCOUNTER — TRANSFERRED RECORDS (OUTPATIENT)
Dept: HEALTH INFORMATION MANAGEMENT | Facility: CLINIC | Age: 79
End: 2019-05-24

## 2019-05-24 ENCOUNTER — HOSPITAL ENCOUNTER (INPATIENT)
Facility: CLINIC | Age: 79
LOS: 3 days | Discharge: HOME | DRG: 863 | End: 2019-05-28
Attending: EMERGENCY MEDICINE | Admitting: SURGERY
Payer: COMMERCIAL

## 2019-05-24 ENCOUNTER — PATIENT OUTREACH (OUTPATIENT)
Dept: SURGERY | Facility: CLINIC | Age: 79
End: 2019-05-24

## 2019-05-24 DIAGNOSIS — S36.00XA INJURY OF SPLEEN, INITIAL ENCOUNTER: ICD-10-CM

## 2019-05-24 DIAGNOSIS — S36.00XA: ICD-10-CM

## 2019-05-24 DIAGNOSIS — S36.039D LACERATION OF SPLEEN, SUBSEQUENT ENCOUNTER: Primary | ICD-10-CM

## 2019-05-24 DIAGNOSIS — S36.039A LACERATION OF SPLEEN, INITIAL ENCOUNTER: ICD-10-CM

## 2019-05-24 DIAGNOSIS — D73.3 SPLENIC ABSCESS: ICD-10-CM

## 2019-05-24 LAB
ALBUMIN SERPL-MCNC: 2.8 G/DL (ref 3.4–5)
ALP SERPL-CCNC: 78 U/L (ref 40–150)
ALT SERPL W P-5'-P-CCNC: 31 U/L (ref 0–70)
ANION GAP SERPL CALCULATED.3IONS-SCNC: 9 MMOL/L (ref 3–14)
AST SERPL W P-5'-P-CCNC: 32 U/L (ref 0–45)
BASOPHILS # BLD AUTO: 0 10E9/L (ref 0–0.2)
BASOPHILS NFR BLD AUTO: 0.3 %
BILIRUB SERPL-MCNC: 1.3 MG/DL (ref 0.2–1.3)
BUN SERPL-MCNC: 8 MG/DL (ref 7–30)
CALCIUM SERPL-MCNC: 8.8 MG/DL (ref 8.5–10.1)
CHLORIDE SERPL-SCNC: 96 MMOL/L (ref 94–109)
CO2 SERPL-SCNC: 27 MMOL/L (ref 20–32)
CREAT SERPL-MCNC: 0.64 MG/DL (ref 0.66–1.25)
DIFFERENTIAL METHOD BLD: ABNORMAL
EOSINOPHIL # BLD AUTO: 0.1 10E9/L (ref 0–0.7)
EOSINOPHIL NFR BLD AUTO: 0.4 %
ERYTHROCYTE [DISTWIDTH] IN BLOOD BY AUTOMATED COUNT: 13.7 % (ref 10–15)
GFR SERPL CREATININE-BSD FRML MDRD: >90 ML/MIN/{1.73_M2}
GLUCOSE SERPL-MCNC: 184 MG/DL (ref 70–99)
HCT VFR BLD AUTO: 32.7 % (ref 40–53)
HGB BLD-MCNC: 10.5 G/DL (ref 13.3–17.7)
IMM GRANULOCYTES # BLD: 0.1 10E9/L (ref 0–0.4)
IMM GRANULOCYTES NFR BLD: 0.5 %
LYMPHOCYTES # BLD AUTO: 0.7 10E9/L (ref 0.8–5.3)
LYMPHOCYTES NFR BLD AUTO: 4.9 %
MCH RBC QN AUTO: 29.3 PG (ref 26.5–33)
MCHC RBC AUTO-ENTMCNC: 32.1 G/DL (ref 31.5–36.5)
MCV RBC AUTO: 91 FL (ref 78–100)
MONOCYTES # BLD AUTO: 0.9 10E9/L (ref 0–1.3)
MONOCYTES NFR BLD AUTO: 6.2 %
NEUTROPHILS # BLD AUTO: 12.9 10E9/L (ref 1.6–8.3)
NEUTROPHILS NFR BLD AUTO: 87.7 %
NRBC # BLD AUTO: 0 10*3/UL
NRBC BLD AUTO-RTO: 0 /100
PLATELET # BLD AUTO: 218 10E9/L (ref 150–450)
POTASSIUM SERPL-SCNC: 4.2 MMOL/L (ref 3.4–5.3)
PROT SERPL-MCNC: 7.3 G/DL (ref 6.8–8.8)
RBC # BLD AUTO: 3.58 10E12/L (ref 4.4–5.9)
SODIUM SERPL-SCNC: 133 MMOL/L (ref 133–144)
WBC # BLD AUTO: 14.7 10E9/L (ref 4–11)

## 2019-05-24 PROCEDURE — 25000132 ZZH RX MED GY IP 250 OP 250 PS 637: Performed by: STUDENT IN AN ORGANIZED HEALTH CARE EDUCATION/TRAINING PROGRAM

## 2019-05-24 PROCEDURE — 99285 EMERGENCY DEPT VISIT HI MDM: CPT | Performed by: EMERGENCY MEDICINE

## 2019-05-24 PROCEDURE — 80053 COMPREHEN METABOLIC PANEL: CPT | Performed by: EMERGENCY MEDICINE

## 2019-05-24 PROCEDURE — 00000146 ZZHCL STATISTIC GLUCOSE BY METER IP

## 2019-05-24 PROCEDURE — 85025 COMPLETE CBC W/AUTO DIFF WBC: CPT | Performed by: EMERGENCY MEDICINE

## 2019-05-24 PROCEDURE — G0378 HOSPITAL OBSERVATION PER HR: HCPCS

## 2019-05-24 PROCEDURE — 25800030 ZZH RX IP 258 OP 636: Performed by: STUDENT IN AN ORGANIZED HEALTH CARE EDUCATION/TRAINING PROGRAM

## 2019-05-24 PROCEDURE — 87040 BLOOD CULTURE FOR BACTERIA: CPT | Performed by: EMERGENCY MEDICINE

## 2019-05-24 PROCEDURE — 99285 EMERGENCY DEPT VISIT HI MDM: CPT | Mod: Z6 | Performed by: EMERGENCY MEDICINE

## 2019-05-24 RX ORDER — ACETAMINOPHEN 325 MG/1
650 TABLET ORAL EVERY 4 HOURS PRN
Status: DISCONTINUED | OUTPATIENT
Start: 2019-05-24 | End: 2019-05-28 | Stop reason: HOSPADM

## 2019-05-24 RX ORDER — HYDROMORPHONE HCL/0.9% NACL/PF 0.2MG/0.2
0.2 SYRINGE (ML) INTRAVENOUS
Status: DISCONTINUED | OUTPATIENT
Start: 2019-05-24 | End: 2019-05-27

## 2019-05-24 RX ORDER — ONDANSETRON 2 MG/ML
4 INJECTION INTRAMUSCULAR; INTRAVENOUS EVERY 6 HOURS PRN
Status: DISCONTINUED | OUTPATIENT
Start: 2019-05-24 | End: 2019-05-28 | Stop reason: HOSPADM

## 2019-05-24 RX ORDER — AMOXICILLIN 250 MG
1-2 CAPSULE ORAL 2 TIMES DAILY
Status: DISCONTINUED | OUTPATIENT
Start: 2019-05-25 | End: 2019-05-26

## 2019-05-24 RX ORDER — TRIAMTERENE/HYDROCHLOROTHIAZID 37.5-25 MG
1 TABLET ORAL DAILY
Status: DISCONTINUED | OUTPATIENT
Start: 2019-05-25 | End: 2019-05-28 | Stop reason: HOSPADM

## 2019-05-24 RX ORDER — SODIUM CHLORIDE, SODIUM LACTATE, POTASSIUM CHLORIDE, CALCIUM CHLORIDE 600; 310; 30; 20 MG/100ML; MG/100ML; MG/100ML; MG/100ML
INJECTION, SOLUTION INTRAVENOUS CONTINUOUS
Status: DISCONTINUED | OUTPATIENT
Start: 2019-05-25 | End: 2019-05-28 | Stop reason: HOSPADM

## 2019-05-24 RX ORDER — NALOXONE HYDROCHLORIDE 0.4 MG/ML
.1-.4 INJECTION, SOLUTION INTRAMUSCULAR; INTRAVENOUS; SUBCUTANEOUS
Status: DISCONTINUED | OUTPATIENT
Start: 2019-05-24 | End: 2019-05-28 | Stop reason: HOSPADM

## 2019-05-24 RX ORDER — PROCHLORPERAZINE 25 MG
12.5 SUPPOSITORY, RECTAL RECTAL EVERY 12 HOURS PRN
Status: DISCONTINUED | OUTPATIENT
Start: 2019-05-24 | End: 2019-05-28 | Stop reason: HOSPADM

## 2019-05-24 RX ORDER — ONDANSETRON 4 MG/1
4 TABLET, ORALLY DISINTEGRATING ORAL EVERY 6 HOURS PRN
Status: DISCONTINUED | OUTPATIENT
Start: 2019-05-24 | End: 2019-05-28 | Stop reason: HOSPADM

## 2019-05-24 RX ORDER — PROCHLORPERAZINE MALEATE 5 MG
5 TABLET ORAL EVERY 6 HOURS PRN
Status: DISCONTINUED | OUTPATIENT
Start: 2019-05-24 | End: 2019-05-28 | Stop reason: HOSPADM

## 2019-05-24 RX ORDER — TAMSULOSIN HYDROCHLORIDE 0.4 MG/1
0.4 CAPSULE ORAL AT BEDTIME
Status: DISCONTINUED | OUTPATIENT
Start: 2019-05-25 | End: 2019-05-28 | Stop reason: HOSPADM

## 2019-05-24 RX ORDER — LORATADINE 10 MG/1
10 TABLET ORAL DAILY
Status: DISCONTINUED | OUTPATIENT
Start: 2019-05-25 | End: 2019-05-28 | Stop reason: HOSPADM

## 2019-05-24 RX ORDER — OXYCODONE HYDROCHLORIDE 5 MG/1
5-10 TABLET ORAL
Status: DISCONTINUED | OUTPATIENT
Start: 2019-05-24 | End: 2019-05-27

## 2019-05-24 RX ADMIN — SODIUM CHLORIDE, POTASSIUM CHLORIDE, SODIUM LACTATE AND CALCIUM CHLORIDE: 600; 310; 30; 20 INJECTION, SOLUTION INTRAVENOUS at 23:43

## 2019-05-24 RX ADMIN — SENNOSIDES AND DOCUSATE SODIUM 2 TABLET: 8.6; 5 TABLET ORAL at 23:42

## 2019-05-24 RX ADMIN — TAMSULOSIN HYDROCHLORIDE 0.4 MG: 0.4 CAPSULE ORAL at 23:42

## 2019-05-24 ASSESSMENT — ENCOUNTER SYMPTOMS
FEVER: 0
NAUSEA: 1
ABDOMINAL PAIN: 1
SHORTNESS OF BREATH: 0
ABDOMINAL DISTENTION: 1

## 2019-05-24 ASSESSMENT — MIFFLIN-ST. JEOR
SCORE: 1367.21
SCORE: 1358.6

## 2019-05-24 NOTE — PROGRESS NOTES
Received call from DOUGLAS Nassar, at [facility] regarding patient that was recently discharged from [facility] Trauma Team with a splenic laceration.  Provider requesting to speak to staff regarding possible direct admission due to new findings.  Cesario was provided with the Resident Pager ([phone]) and  ([phone]) to reach the team.

## 2019-05-24 NOTE — ED TRIAGE NOTES
Pt c/o abd pain that started on 5/6/19 after falling. Today, he went to  and a CT scan showed a spleenic hematoma. He had an ablation for this on 5/6/19 during admission. States pain is getting worse despite taking Oxycodone x2. L PIV in place from  today.

## 2019-05-25 ENCOUNTER — APPOINTMENT (OUTPATIENT)
Dept: INTERVENTIONAL RADIOLOGY/VASCULAR | Facility: CLINIC | Age: 79
DRG: 863 | End: 2019-05-25
Attending: RADIOLOGY
Payer: COMMERCIAL

## 2019-05-25 PROBLEM — D73.3 SPLENIC ABSCESS: Status: ACTIVE | Noted: 2019-05-25

## 2019-05-25 LAB
ANION GAP SERPL CALCULATED.3IONS-SCNC: 8 MMOL/L (ref 3–14)
BUN SERPL-MCNC: 8 MG/DL (ref 7–30)
CALCIUM SERPL-MCNC: 8.9 MG/DL (ref 8.5–10.1)
CHLORIDE SERPL-SCNC: 99 MMOL/L (ref 94–109)
CO2 SERPL-SCNC: 26 MMOL/L (ref 20–32)
CREAT SERPL-MCNC: 0.66 MG/DL (ref 0.66–1.25)
ERYTHROCYTE [DISTWIDTH] IN BLOOD BY AUTOMATED COUNT: 13.9 % (ref 10–15)
GFR SERPL CREATININE-BSD FRML MDRD: >90 ML/MIN/{1.73_M2}
GLUCOSE BLDC GLUCOMTR-MCNC: 149 MG/DL (ref 70–99)
GLUCOSE BLDC GLUCOMTR-MCNC: 160 MG/DL (ref 70–99)
GLUCOSE SERPL-MCNC: 182 MG/DL (ref 70–99)
GRAM STN SPEC: ABNORMAL
GRAM STN SPEC: ABNORMAL
HCT VFR BLD AUTO: 32.3 % (ref 40–53)
HGB BLD-MCNC: 10.3 G/DL (ref 13.3–17.7)
LACTATE BLD-SCNC: 1 MMOL/L (ref 0.7–2)
MAGNESIUM SERPL-MCNC: 1.9 MG/DL (ref 1.6–2.3)
MCH RBC QN AUTO: 29.3 PG (ref 26.5–33)
MCHC RBC AUTO-ENTMCNC: 31.9 G/DL (ref 31.5–36.5)
MCV RBC AUTO: 92 FL (ref 78–100)
PHOSPHATE SERPL-MCNC: 3.5 MG/DL (ref 2.5–4.5)
PLATELET # BLD AUTO: 208 10E9/L (ref 150–450)
POTASSIUM SERPL-SCNC: 4 MMOL/L (ref 3.4–5.3)
RBC # BLD AUTO: 3.52 10E12/L (ref 4.4–5.9)
SODIUM SERPL-SCNC: 133 MMOL/L (ref 133–144)
SPECIMEN SOURCE: ABNORMAL
WBC # BLD AUTO: 13.9 10E9/L (ref 4–11)

## 2019-05-25 PROCEDURE — 36415 COLL VENOUS BLD VENIPUNCTURE: CPT | Performed by: STUDENT IN AN ORGANIZED HEALTH CARE EDUCATION/TRAINING PROGRAM

## 2019-05-25 PROCEDURE — 36415 COLL VENOUS BLD VENIPUNCTURE: CPT | Performed by: SURGERY

## 2019-05-25 PROCEDURE — 84100 ASSAY OF PHOSPHORUS: CPT | Performed by: STUDENT IN AN ORGANIZED HEALTH CARE EDUCATION/TRAINING PROGRAM

## 2019-05-25 PROCEDURE — 27211039 ZZH NEEDLE CR2

## 2019-05-25 PROCEDURE — 40000141 ZZH STATISTIC PERIPHERAL IV START W/O US GUIDANCE

## 2019-05-25 PROCEDURE — 99153 MOD SED SAME PHYS/QHP EA: CPT

## 2019-05-25 PROCEDURE — 00000146 ZZHCL STATISTIC GLUCOSE BY METER IP

## 2019-05-25 PROCEDURE — 87070 CULTURE OTHR SPECIMN AEROBIC: CPT | Performed by: RADIOLOGY

## 2019-05-25 PROCEDURE — 27210732 ZZH ACCESSORY CR1

## 2019-05-25 PROCEDURE — 25000125 ZZHC RX 250: Performed by: RADIOLOGY

## 2019-05-25 PROCEDURE — 27210903 ZZH KIT CR5

## 2019-05-25 PROCEDURE — 25000128 H RX IP 250 OP 636: Performed by: SURGERY

## 2019-05-25 PROCEDURE — 83605 ASSAY OF LACTIC ACID: CPT | Performed by: SURGERY

## 2019-05-25 PROCEDURE — 99152 MOD SED SAME PHYS/QHP 5/>YRS: CPT

## 2019-05-25 PROCEDURE — 83735 ASSAY OF MAGNESIUM: CPT | Performed by: STUDENT IN AN ORGANIZED HEALTH CARE EDUCATION/TRAINING PROGRAM

## 2019-05-25 PROCEDURE — 87075 CULTR BACTERIA EXCEPT BLOOD: CPT | Performed by: RADIOLOGY

## 2019-05-25 PROCEDURE — 49406 IMAGE CATH FLUID PERI/RETRO: CPT

## 2019-05-25 PROCEDURE — C1729 CATH, DRAINAGE: HCPCS

## 2019-05-25 PROCEDURE — 25800030 ZZH RX IP 258 OP 636: Performed by: STUDENT IN AN ORGANIZED HEALTH CARE EDUCATION/TRAINING PROGRAM

## 2019-05-25 PROCEDURE — 87076 CULTURE ANAEROBE IDENT EACH: CPT | Performed by: RADIOLOGY

## 2019-05-25 PROCEDURE — 87205 SMEAR GRAM STAIN: CPT | Performed by: RADIOLOGY

## 2019-05-25 PROCEDURE — 25000132 ZZH RX MED GY IP 250 OP 250 PS 637: Performed by: STUDENT IN AN ORGANIZED HEALTH CARE EDUCATION/TRAINING PROGRAM

## 2019-05-25 PROCEDURE — 25000128 H RX IP 250 OP 636: Performed by: RADIOLOGY

## 2019-05-25 PROCEDURE — 96361 HYDRATE IV INFUSION ADD-ON: CPT

## 2019-05-25 PROCEDURE — C1769 GUIDE WIRE: HCPCS

## 2019-05-25 PROCEDURE — 079P30Z DRAINAGE OF SPLEEN WITH DRAINAGE DEVICE, PERCUTANEOUS APPROACH: ICD-10-PCS | Performed by: RADIOLOGY

## 2019-05-25 PROCEDURE — 80048 BASIC METABOLIC PNL TOTAL CA: CPT | Performed by: STUDENT IN AN ORGANIZED HEALTH CARE EDUCATION/TRAINING PROGRAM

## 2019-05-25 PROCEDURE — G0378 HOSPITAL OBSERVATION PER HR: HCPCS

## 2019-05-25 PROCEDURE — 12000001 ZZH R&B MED SURG/OB UMMC

## 2019-05-25 PROCEDURE — 96374 THER/PROPH/DIAG INJ IV PUSH: CPT | Mod: 59

## 2019-05-25 PROCEDURE — 85027 COMPLETE CBC AUTOMATED: CPT | Performed by: STUDENT IN AN ORGANIZED HEALTH CARE EDUCATION/TRAINING PROGRAM

## 2019-05-25 PROCEDURE — 25000128 H RX IP 250 OP 636: Performed by: STUDENT IN AN ORGANIZED HEALTH CARE EDUCATION/TRAINING PROGRAM

## 2019-05-25 RX ORDER — FENTANYL CITRATE 50 UG/ML
25-50 INJECTION, SOLUTION INTRAMUSCULAR; INTRAVENOUS EVERY 5 MIN PRN
Status: DISCONTINUED | OUTPATIENT
Start: 2019-05-25 | End: 2019-05-25 | Stop reason: HOSPADM

## 2019-05-25 RX ORDER — FLUMAZENIL 0.1 MG/ML
0.2 INJECTION, SOLUTION INTRAVENOUS
Status: DISCONTINUED | OUTPATIENT
Start: 2019-05-25 | End: 2019-05-25 | Stop reason: HOSPADM

## 2019-05-25 RX ORDER — PIPERACILLIN SODIUM, TAZOBACTAM SODIUM 4; .5 G/20ML; G/20ML
4.5 INJECTION, POWDER, LYOPHILIZED, FOR SOLUTION INTRAVENOUS EVERY 6 HOURS
Status: DISCONTINUED | OUTPATIENT
Start: 2019-05-25 | End: 2019-05-28 | Stop reason: HOSPADM

## 2019-05-25 RX ORDER — NALOXONE HYDROCHLORIDE 0.4 MG/ML
.1-.4 INJECTION, SOLUTION INTRAMUSCULAR; INTRAVENOUS; SUBCUTANEOUS
Status: DISCONTINUED | OUTPATIENT
Start: 2019-05-25 | End: 2019-05-25 | Stop reason: HOSPADM

## 2019-05-25 RX ORDER — CEFAZOLIN SODIUM 2 G/100ML
2 INJECTION, SOLUTION INTRAVENOUS
Status: COMPLETED | OUTPATIENT
Start: 2019-05-25 | End: 2019-05-25

## 2019-05-25 RX ORDER — PIPERACILLIN SODIUM, TAZOBACTAM SODIUM 3; .375 G/15ML; G/15ML
3.38 INJECTION, POWDER, LYOPHILIZED, FOR SOLUTION INTRAVENOUS EVERY 6 HOURS
Status: DISCONTINUED | OUTPATIENT
Start: 2019-05-25 | End: 2019-05-25

## 2019-05-25 RX ADMIN — PIPERACILLIN SODIUM,TAZOBACTAM SODIUM 4.5 G: 4; .5 INJECTION, POWDER, FOR SOLUTION INTRAVENOUS at 10:45

## 2019-05-25 RX ADMIN — TRIAMTERENE AND HYDROCHLOROTHIAZIDE 1 TABLET: 37.5; 25 TABLET ORAL at 08:42

## 2019-05-25 RX ADMIN — ACETAMINOPHEN 650 MG: 325 TABLET, FILM COATED ORAL at 06:09

## 2019-05-25 RX ADMIN — Medication 0.2 MG: at 17:32

## 2019-05-25 RX ADMIN — MIDAZOLAM 2 MG: 1 INJECTION INTRAMUSCULAR; INTRAVENOUS at 16:11

## 2019-05-25 RX ADMIN — FENTANYL CITRATE 100 MCG: 50 INJECTION INTRAMUSCULAR; INTRAVENOUS at 16:11

## 2019-05-25 RX ADMIN — LORATADINE 10 MG: 10 TABLET ORAL at 08:42

## 2019-05-25 RX ADMIN — LIDOCAINE HYDROCHLORIDE 30 ML: 10 INJECTION, SOLUTION INFILTRATION; PERINEURAL at 16:11

## 2019-05-25 RX ADMIN — SENNOSIDES AND DOCUSATE SODIUM 2 TABLET: 8.6; 5 TABLET ORAL at 08:42

## 2019-05-25 RX ADMIN — CEFAZOLIN SODIUM 2 G: 2 INJECTION, SOLUTION INTRAVENOUS at 14:47

## 2019-05-25 RX ADMIN — SODIUM CHLORIDE, POTASSIUM CHLORIDE, SODIUM LACTATE AND CALCIUM CHLORIDE: 600; 310; 30; 20 INJECTION, SOLUTION INTRAVENOUS at 12:20

## 2019-05-25 RX ADMIN — PIPERACILLIN SODIUM,TAZOBACTAM SODIUM 4.5 G: 4; .5 INJECTION, POWDER, FOR SOLUTION INTRAVENOUS at 23:11

## 2019-05-25 RX ADMIN — OXYCODONE HYDROCHLORIDE 5 MG: 5 TABLET ORAL at 18:54

## 2019-05-25 RX ADMIN — ACETAMINOPHEN 650 MG: 325 TABLET, FILM COATED ORAL at 18:54

## 2019-05-25 RX ADMIN — PIPERACILLIN SODIUM,TAZOBACTAM SODIUM 4.5 G: 4; .5 INJECTION, POWDER, FOR SOLUTION INTRAVENOUS at 17:32

## 2019-05-25 ASSESSMENT — ACTIVITIES OF DAILY LIVING (ADL): ADLS_ACUITY_SCORE: 11

## 2019-05-25 NOTE — ED NOTES
[facility], [place]   ED Nurse to Floor Handoff     Jayden Don is a 78 year old male who speaks English and lives with family members,  in a home  They arrived in the ED by car from home    ED Chief Complaint: Abdominal Pain    ED Dx;   Final diagnoses:   None         Needed?: No    Allergies:   Allergies   Allergen Reactions     Crestor [Rosuvastatin] Other (See Comments)     Joint Pain     No Known Drug Allergies    .  Past Medical Hx:   Past Medical History:   Diagnosis Date     Allergic state      Benign prostatic hyperplasia      Diabetes mellitus type 2, controlled, without complications (H)     controlled with diet and exercise      HTN (hypertension)     controlled with medication     Macular degeneration of right eye     receives injections q8w     Obstructive chronic bronchitis with exacerbation (H)       Baseline Mental status: WDL  Current Mental Status changes: at basesline    Infection present or suspected this encounter: yes, other  Sepsis suspected: No  Isolation type: No active isolations     Activity level - Baseline/Home:  Independent  Activity Level - Current:   Stand with Assist    Bariatric equipment needed?: No    In the ED these meds were given: Medications - No data to display    Drips running?  No    Home pump  No    Current LDAs  Peripheral IV 05/13/19 Right;Anterior Lower forearm (Active)   Number of days: 11       Labs results:   Labs Ordered and Resulted from Time of ED Arrival Up to the Time of Departure from the ED   CBC WITH PLATELETS DIFFERENTIAL - Abnormal; Notable for the following components:       Result Value    WBC 14.7 (*)     RBC Count 3.58 (*)     Hemoglobin 10.5 (*)     Hematocrit 32.7 (*)     Absolute Neutrophil 12.9 (*)     Absolute Lymphocytes 0.7 (*)     All other components within normal limits   COMPREHENSIVE METABOLIC PANEL - Abnormal; Notable for the following components:    Glucose 184 (*)     Creatinine 0.64 (*)      "Albumin 2.8 (*)     All other components within normal limits   BLOOD CULTURE       Imaging Studies: No results found for this or any previous visit (from the past 24 hour(s)).    Recent vital signs:   /63   Temp 98.5  F (36.9  C) (Oral)   Resp 16   Ht 1.6 m (5' 3\")   Wt 74.2 kg (163 lb 9.6 oz)   SpO2 95%   BMI 28.98 kg/m      Naz Coma Scale Score: 15 (05/24/19 1929)       Cardiac Rhythm: Normal Sinus  Pt needs tele? No  Skin/wound Issues: None    Code Status: Full Code    Pain control: good    Nausea control: pt had none    Abnormal labs/tests/findings requiring intervention: see epic    Family present during ED course? Yes   Family Comments/Social Situation comments: n/a    Tasks needing completion: None      [phone] [facility] ED      "

## 2019-05-25 NOTE — H&P
GENERAL SURGERY HISTORY & PHYSICAL    Patient: Jayden Don   MRN: [id number]     Date of Admission: 5/24/2019  Attending Physician: Dr. Sebastian Johnson    CC: Abdominal Pain    HPI: Jayden Don is a 78 year old male with history of DM (controlled w/ diet & exercise), BPH, HTN, and grade IV (radiology read as III) splenic laceration after ground level fall on 5/10/2019, received 1 unit pRBCs and underwent IR embolization with gel foam and coiling. He required one additional unit of blood for low hemoglobin while inpatient and was discharged on 5/16. He presented to [facility] urgent care for abdominal bloating, nausea, fatiuge, and poor PO intake today. He was hemodynamically stable and had a CT abd pelvis (images in PACS) which showed air within subcapsular hematoma and intraperitoneal fluid collection. He was transferred to [facility] ED for further evaluation. Patient reports bloating, nausea, fatigue, no food since 0730 today, chills, dry cough, congestion, and overall feeling unwell. He had a small BM today, loose BM yesterday after laxatives, and is passing gas. No fevers, vomiting, or dizziness. He reports glucose has been somewhat higher but within goal ranges, AM <130, before supper ~106, and 2 hours post-prandial 190. Family reports that he may not have been taking adequate stool softeners.    PMH:  Past Medical History:   Diagnosis Date     Allergic state      Benign prostatic hyperplasia      Diabetes mellitus type 2, controlled, without complications (H)     controlled with diet and exercise      HTN (hypertension)     controlled with medication     Macular degeneration of right eye     receives injections q8w     Obstructive chronic bronchitis with exacerbation (H)      PSH:  Past Surgical History:   Procedure Laterality Date     ENT SURGERY  1989    Surgery on nose      GI SURGERY  05/06/2019    Ablation on spleen      IR VISCERAL ANGIOGRAM  5/10/2019     ORTHOPEDIC SURGERY       OTHER SURGICAL  HISTORY      Sinus/nose surgery many years ago     OTHER SURGICAL HISTORY      Trigger thumb     FH:  Family History   Problem Relation Age of Onset     C.A.D. Mother      Breast Cancer Mother         uterine      Genetic Disorder Mother         machular degeneration     Neurologic Disorder Father         parkinson     Eye Disorder Brother         photophobia     SH:  Social History     Socioeconomic History     Marital status:      Spouse name: Not on file     Number of children: Not on file     Years of education: Not on file     Highest education level: Not on file   Occupational History     Not on file   Social Needs     Financial resource strain: Not on file     Food insecurity:     Worry: Not on file     Inability: Not on file     Transportation needs:     Medical: Not on file     Non-medical: Not on file   Tobacco Use     Smoking status: Former Smoker     Smokeless tobacco: Never Used   Substance and Sexual Activity     Alcohol use: Yes     Comment: moderate     Drug use: Never     Sexual activity: Not on file   Lifestyle     Physical activity:     Days per week: Not on file     Minutes per session: Not on file     Stress: Not on file   Relationships     Social connections:     Talks on phone: Not on file     Gets together: Not on file     Attends Religion service: Not on file     Active member of club or organization: Not on file     Attends meetings of clubs or organizations: Not on file     Relationship status: Not on file     Intimate partner violence:     Fear of current or ex partner: Not on file     Emotionally abused: Not on file     Physically abused: Not on file     Forced sexual activity: Not on file   Other Topics Concern     Not on file   Social History Narrative     Not on file     Allergies:  Allergies   Allergen Reactions     Crestor [Rosuvastatin] Other (See Comments)     Joint Pain     No Known Drug Allergies      Home Meds:  Current Outpatient Medications   Medication Sig Dispense  Refill     acetaminophen (TYLENOL) 325 MG tablet Take 3 tablets (975 mg) by mouth every 8 hours as needed for mild pain       oxyCODONE HCl (OXAYDO) 5 MG TABA Take 5 mg by mouth       polyethylene glycol (MIRALAX/GLYCOLAX) packet Take 17 g by mouth daily 14 packet 0     tamsulosin (FLOMAX) 0.4 MG capsule Take 1 capsule (0.4 mg) by mouth At Bedtime 30 capsule 0     TRIAMTERENE-HCTZ 37.5-25 MG OR CAPS 1 CAPSULE DAILY 60 0     VITAMIN E 800 IU OR CAPS 1 tablet daily       ANDROGEL 50 MG/5GM TD PACK 1 pack topically q day 30 11     GLUCOSAMINE-CHONDROITIN 750-600 MG OR TABS 1 tablet at bedtime       loratadine (CLARITIN) 10 MG tablet Take 1 tablet (10 mg) by mouth daily       LOTRISONE 1-0.05 % EX CREA apply as directed 30 gm tube 3     VIAGRA 100 MG OR TABS 1 TABLET BY MOUTH AS DIRECTED 6 6     ZITHROMAX Z-CHETAN 250 MG OR CAPS 2 tablets day 1, 1 tablet days 2-5 6 0      ROS:   General: chills, no fevers  Eyes: no changes in vision  ENT: congestion, no sore throat  Resp: dry cough  CV: no chest pain  MSK: bilateral ankle swelling  GI: see HPI  Neuro: no dizziness  Endo: no polyuria    Physical Exam:  Temp:  [98.5  F (36.9  C)] 98.5  F (36.9  C)  Heart Rate:  [94] 94  Resp:  [16] 16  BP: (139)/(63) 139/63  SpO2:  [95 %] 95 %    General: AAOx4, NAD, lying comfortably in bed  Eyes: anicteric  CV: regular rate, warm, well-perfused  Pulm: no dyspnea, breathing comfortably on RA, lungs clear to auscultation bilaterally  Abd: large abdomen but soft, mild tenderness to deep palpation in LUQ otherwise nontender, no guarding, no rebound  Extremities: 2+ edema at bilateral ankles  Skin: no rashes  Neuro: moving all extremities spontaneously without apparent deficit, normal gait    Labs:  CBC   Recent Labs   Lab 05/24/19 1941   WBC 14.7*   HGB 10.5*        BMP   Recent Labs   Lab 05/24/19 1941      POTASSIUM 4.2   CHLORIDE 96   CO2 27   BUN 8   CR 0.64*   *     LFTs   Recent Labs   Lab 05/24/19 1941   AST 32    ALT 31   ALKPHOS 78   BILITOTAL 1.3   ALBUMIN 2.8*       Imaging:  [facility] CT Abdomen Pelvis 5/24 (images available in PACS)  Subcapsular splenic hematoma with multiple small foci of gas increased from previous gas seen. Intraperitoneal hemoperitoneum in lower abdomen that is relatively unchanged since 5/13 scan.    Outside read:  1. Evidence of prior splenic lacerations with a large subcapsular splenic hematoma. Air within the subcapsular hematoma may be sequela of prior embolization but may also be seen with superimposed infection or developing abscess.  2. Large hyperdense 10.1 x 9.7 x 6.1 cm collection at the inferior margin of the spleen is indeterminate for additional large subcapsular hematoma or extra splenic blood clot.  3. Small to moderate amount of intraperitoneal hemoperitoneum with a small amount of left retroperitoneal hemorrhage.  4. Interval change of the splenic/perisplenic hematoma, hemoperitoneum and retroperitoneal blood cannot be assessed given the lack of prior outside studies for comparison.    CT Abdomen Pelvis 5/13 [facility]:  1.  Grossly unchanged grade III splenic laceration status post  intravascular coiling of the splenic artery.   Adjacent perisplenic  hematoma has increased and now contains some foci of gas.  Left  abdominal retroperitoneal hemorrhage is not significantly changed.  There is no contrast extravasation to suggest active bleeding.  2. Increased small volume of hemoperitoneum in the lower abdomen and  pelvis since 5/10/2019.  3. Stable 3.7 subcentimeter right lower lobe opacity, indeterminate.   Short interval follow-up chest CT in 1 - 3 months is recommended if  prior imaging to document stability is unavailable.    4. Calcified pleural plaques bilaterally presumably related to  previous asbestos exposure.  5. Loculated fluid tracking along the left major fissure and  interlobular septal thickening in both lung bases suggestive of  pulmonary edema.  Increased left  basilar subsegmental atelectasis  and/or consolidation.  6.  Additional incidental and unchanged findings as above.    ASSESSMENT/PLAN:  Jayden Don is a 78 year old male with hisotry of grade IV (radiology read as III) splenic laceration after ground level fall on 5/10/2019, s/p IR embolization with gel foam and coiling. He required two units of pRBCs and was discharged on 5/16. He presents today for abdominal bloating, nausea, and fatigue. CT scan shows increased foci of air in subcapsular hematoma, small to moderate intraperitoneal hemoperitoneum. CRP >160 (outside lab), WBC 14.7 and chills but no fevers, Hgb 10.5 (from 8.9 on 5/16) normal vital signs, and unremarkable physical exam. Likely post-embolization changes in spleen, some concern for abscess so will admit under observation and re-evaluate in AM. NPO in case IR procedure is pursued.    Neuro:      - Pain: acetaminophen Q4H PRN, oxycodone 5-10 mg Q3H PRN, dilaudid 0.2 mg IV Q2H PRN  CV: PTA maxzide  Pulm: satting on room air  FEN/GI:      - IVF: LR 1000 mL bolus     - Diet: NPO     - Bowel regimen: senna-docusate BID,      - Nausea control: PRN zofran, prn compazine     - AM BMP, phos     - Consider IR consult in AM for aspiration vs drain  Renal: PTA tamsulosin  ID: repeat CBC in AM  PPx: OOB, SCDs  Dispo: admit to observation under general surgery      Patient and plan discussed with Dr. Sebastian Johnson, who will discuss with attending.    Warren Hughes, MS4      ADDENDUM:  I examined the patient with the medical student.  I addended and agree with note.    Emelyn Lozano MD/MPH  Plastic Surgery PGY2

## 2019-05-25 NOTE — PLAN OF CARE
Observation Goals Per Unit Routine  Comments: Monitor Vital Signs  Pain Control: Yes; not requiring pain interventions  Nausea Control: Yes; pt not nauseous  Ambulation: Yes; Pt ambulating to bathroom w/ stand by assist

## 2019-05-25 NOTE — PROGRESS NOTES
Interventional Radiology Pre-Procedure Sedation Assessment   Time of Assessment: 2:34 PM    Expected Level: Moderate Sedation    Indication: Sedation is required for the following type of Procedure: Drain    Sedation and procedural consent: Risks, benefits and alternatives were discussed with Patient    PO Intake: Appropriately NPO for procedure    ASA Class: Class 2 - MILD SYSTEMIC DISEASE, NO ACUTE PROBLEMS, NO FUNCTIONAL LIMITATIONS.    Mallampati: Grade 2:  Soft palate, base of uvula, tonsillar pillars, and portion of posterior pharyngeal wall visible    Lungs: Lungs Clear with good breath sounds bilaterally    Heart: Normal heart sounds and rate    History and physical reviewed and no updates needed. I have reviewed the lab findings, diagnostic data, medications, and the plan for sedation. I have determined this patient to be an appropriate candidate for the planned sedation and procedure and have reassessed the patient IMMEDIATELY PRIOR to sedation and procedure.    Riley Rubio MD

## 2019-05-25 NOTE — PROGRESS NOTES
Patient Name: Jadyen Don  Medical Record Number: [id number]  Today's Date: 5/25/2019    Procedure: image guided drain placement  Proceduralist: Dr. NASRIN Rubio MD, Dr. ISRAEL Irizarry MD    Sedation start time: 1459  Sedation end time: 1612  Sedation medications administered: 2 mg versed, 100 mcg fentanyl  Total sedation time: 73 min    Other medications: Ancef 2G    Procedure start time: 1516  Puncture time: 1521  Procedure end time: 1630    Report given to: RN     Other Notes: Pt arrived to IR room CT2 from . Consent reviewed, pt confirmed. Pt denies any questions or concerns regarding procedure. Pt positioned supine with slight tilt to left side and monitored per protocol. Labs sent as ordered. Site cleansed and dressed per protocol. Pt tolerated procedure without any noted complications. Pt transferred back to .

## 2019-05-25 NOTE — PLAN OF CARE
"/69 (BP Location: Right arm)   Temp 100.4  F (38  C) (Oral)   Resp 18   Ht 1.6 m (5' 3\")   Wt 73.3 kg (161 lb 11.2 oz)   SpO2 97%   BMI 28.64 kg/m         Observation Goals Per Unit Routine  Comments: Monitor Vital Signs  Pain Control: Yes; not requiring pain interventions  Nausea Control: Yes; pt not nauseous  Ambulation: Yes; Pt ambulating to bathroom w/ stand by assist     Pt arrived to unit ~2200. Pt max temp 100.4. PRN tyelnol given. Other VSS. Pt reports abdominal discomfort/bloating. Stool softener given. 1 BM during shift. Pt voiding frequent small amounts. Pt NPO w/ LR @ 100 mL/hr. Possible IR today. Continue to monitor.      "

## 2019-05-25 NOTE — ED PROVIDER NOTES
"  History     Chief Complaint   Patient presents with     Abdominal Pain     HPI  Jayden Don is a 78 year old male with a history of DM Type II and HTN who presents for evaluation of abdominal pain. Per chart review, the patient was hospitalized from 5/10/19-5/16/19 after a splenic laceration secondary to a ground level fall and underwent IR angiogram with embolization. Since his discharge, the patient says he has been uncomfortable with abdominal bloating, nausea, and not eating well. Today, these symptoms worsened so he went to  and CT scan was concerning for air within the hematoma, which may be sequela of prior embolization or superimposed infection or developing abscess (see below). They recommended he come to the emergency department for evaluation. He denies abdominal pain and states he has \"discomfort\" secondary to the abdominal distention. He passed a small bowel movement earlier today. He has been taking 625mg Tylenol every 8 hours at home without significant relief.    CT scan ([facility], 5/24/19):  IMPRESSION:    1. Evidence of prior splenic lacerations with a large subcapsular splenic hematoma. Air within the subcapsular hematoma may be sequela of prior embolization but may also be seen with superimposed infection or developing abscess.  2. Large hyperdense 10.1 x 9.7 x 6.1 cm collection at the inferior margin of the spleen is indeterminate for additional large subcapsular hematoma or extra splenic blood clot.  3. Small to moderate amount of intraperitoneal hemoperitoneum with a small amount of left retroperitoneal hemorrhage.  4. Interval change of the splenic/perisplenic hematoma, hemoperitoneum and retroperitoneal blood cannot be assessed given the lack of prior outside studies for comparison.    Past Medical History:   Diagnosis Date     Allergic state      Benign prostatic hyperplasia      Diabetes mellitus type 2, controlled, without complications (H)     controlled with diet and " exercise      HTN (hypertension)     controlled with medication     Macular degeneration of right eye     receives injections q8w     Obstructive chronic bronchitis with exacerbation (H)        Past Surgical History:   Procedure Laterality Date     ENT SURGERY  1989    Surgery on nose      GI SURGERY  05/06/2019    Ablation on spleen      IR VISCERAL ANGIOGRAM  5/10/2019     ORTHOPEDIC SURGERY       OTHER SURGICAL HISTORY      Sinus/nose surgery many years ago     OTHER SURGICAL HISTORY      Trigger thumb       Family History   Problem Relation Age of Onset     C.A.D. Mother      Breast Cancer Mother         uterine      Genetic Disorder Mother         machular degeneration     Neurologic Disorder Father         parkinson     Eye Disorder Brother         photophobia       Social History     Tobacco Use     Smoking status: Former Smoker     Smokeless tobacco: Never Used   Substance Use Topics     Alcohol use: Yes     Comment: moderate       Current Facility-Administered Medications   Medication     acetaminophen (TYLENOL) tablet 650 mg     HYDROmorphone (DILAUDID) injection 0.2 mg     lactated ringers infusion     loratadine (CLARITIN) tablet 10 mg     naloxone (NARCAN) injection 0.1-0.4 mg     ondansetron (ZOFRAN-ODT) ODT tab 4 mg    Or     ondansetron (ZOFRAN) injection 4 mg     oxyCODONE (ROXICODONE) tablet 5-10 mg     prochlorperazine (COMPAZINE) injection 5 mg    Or     prochlorperazine (COMPAZINE) tablet 5 mg    Or     prochlorperazine (COMPAZINE) Suppository 12.5 mg     senna-docusate (SENOKOT-S/PERICOLACE) 8.6-50 MG per tablet 1-2 tablet     tamsulosin (FLOMAX) capsule 0.4 mg     triamterene-HCTZ (MAXZIDE-25) 37.5-25 MG per tablet 1 tablet        Allergies   Allergen Reactions     Crestor [Rosuvastatin] Other (See Comments)     Joint Pain     No Known Drug Allergies          I have reviewed the Medications, Allergies, Past Medical and Surgical History, and Social History in the Epic system.    Review of  "Systems   Constitutional: Negative for fever.   Respiratory: Negative for shortness of breath.    Cardiovascular: Negative for chest pain.   Gastrointestinal: Positive for abdominal distention, abdominal pain and nausea.   All other systems reviewed and are negative.      Physical Exam   BP: 139/63  Heart Rate: 94  Temp: 98.5  F (36.9  C)  Resp: 16  Height: 160 cm (5' 3\")  Weight: 74.2 kg (163 lb 9.6 oz)  SpO2: 95 %      Physical Exam   Constitutional: He is oriented to person, place, and time. He appears well-developed and well-nourished.   Comfortably resting, lying in bed, NAD, nondiaphoretic, lucid, fully conversant, no  respiratory distress, alert and oriented.     HENT:   Head: Normocephalic and atraumatic.   Eyes: Pupils are equal, round, and reactive to light. EOM are normal.   Neck: Normal range of motion.   Cardiovascular: Normal rate and intact distal pulses.   Pulmonary/Chest: Effort normal. No respiratory distress.   Abdominal: Soft. He exhibits distension.   Ecchymosis on the posterior back leading down to the thigh.  Ecchymosis also wraps around to the left flank   Musculoskeletal: Normal range of motion.   Neurological: He is alert and oriented to person, place, and time.   Skin: Skin is warm and dry. Capillary refill takes less than 2 seconds.   Vitals reviewed.      ED Course      7:27pm: Spoke with General Surgery regarding patient's case and arrival in the ED.    Procedures             Critical Care time:  none     Results for orders placed or performed during the hospital encounter of 05/24/19   CBC with platelets differential   Result Value Ref Range    WBC 14.7 (H) 4.0 - 11.0 10e9/L    RBC Count 3.58 (L) 4.4 - 5.9 10e12/L    Hemoglobin 10.5 (L) 13.3 - 17.7 g/dL    Hematocrit 32.7 (L) 40.0 - 53.0 %    MCV 91 78 - 100 fl    MCH 29.3 26.5 - 33.0 pg    MCHC 32.1 31.5 - 36.5 g/dL    RDW 13.7 10.0 - 15.0 %    Platelet Count 218 150 - 450 10e9/L    Diff Method Automated Method     % Neutrophils 87.7 " %    % Lymphocytes 4.9 %    % Monocytes 6.2 %    % Eosinophils 0.4 %    % Basophils 0.3 %    % Immature Granulocytes 0.5 %    Nucleated RBCs 0 0 /100    Absolute Neutrophil 12.9 (H) 1.6 - 8.3 10e9/L    Absolute Lymphocytes 0.7 (L) 0.8 - 5.3 10e9/L    Absolute Monocytes 0.9 0.0 - 1.3 10e9/L    Absolute Eosinophils 0.1 0.0 - 0.7 10e9/L    Absolute Basophils 0.0 0.0 - 0.2 10e9/L    Abs Immature Granulocytes 0.1 0 - 0.4 10e9/L    Absolute Nucleated RBC 0.0    Comprehensive metabolic panel   Result Value Ref Range    Sodium 133 133 - 144 mmol/L    Potassium 4.2 3.4 - 5.3 mmol/L    Chloride 96 94 - 109 mmol/L    Carbon Dioxide 27 20 - 32 mmol/L    Anion Gap 9 3 - 14 mmol/L    Glucose 184 (H) 70 - 99 mg/dL    Urea Nitrogen 8 7 - 30 mg/dL    Creatinine 0.64 (L) 0.66 - 1.25 mg/dL    GFR Estimate >90 >60 mL/min/[1.73_m2]    GFR Estimate If Black >90 >60 mL/min/[1.73_m2]    Calcium 8.8 8.5 - 10.1 mg/dL    Bilirubin Total 1.3 0.2 - 1.3 mg/dL    Albumin 2.8 (L) 3.4 - 5.0 g/dL    Protein Total 7.3 6.8 - 8.8 g/dL    Alkaline Phosphatase 78 40 - 150 U/L    ALT 31 0 - 70 U/L    AST 32 0 - 45 U/L   Lactic acid level STAT for sepsis protocol   Result Value Ref Range    Lactate for Sepsis Protocol 1.0 0.7 - 2.0 mmol/L   Glucose by meter   Result Value Ref Range    Glucose 149 (H) 70 - 99 mg/dL   Blood Culture ONE site   Result Value Ref Range    Specimen Description Blood Left Arm     Special Requests Received in aerobic bottle only     Culture Micro PENDING                Labs Ordered and Resulted from Time of ED Arrival Up to the Time of Departure from the ED   CBC WITH PLATELETS DIFFERENTIAL - Abnormal; Notable for the following components:       Result Value    WBC 14.7 (*)     RBC Count 3.58 (*)     Hemoglobin 10.5 (*)     Hematocrit 32.7 (*)     Absolute Neutrophil 12.9 (*)     Absolute Lymphocytes 0.7 (*)     All other components within normal limits   COMPREHENSIVE METABOLIC PANEL - Abnormal; Notable for the following  components:    Glucose 184 (*)     Creatinine 0.64 (*)     Albumin 2.8 (*)     All other components within normal limits   BLOOD CULTURE     I have reviewed the patient's prior chart including recent labs, ER visits, and available inpatient discharge summaries if available.           Assessments & Plan (with Medical Decision Making)   This is a 78-year-old male coming into the emergency room with complaints of abdominal distention.  Patient had a spleen that was embolized after a splenic hematoma earlier in the month.  He is now returning from an urgent care where he had a CT showing there was gas within the hematoma which was concerning for possible infection.  He was noted to have an elevated white count as well as an elevated CRP.  On his physical exam he has distended abdomen is otherwise soft.  He has ecchymosis on his left that is extending into his left flank and down his thigh.  He has no other injuries noticed.  His vital signs are otherwise stable.  I did review his labs and his notes from previous.  General surgery was consulted and they evaluated the patient.  At this time their plan is to admit him to the hospital for continued care and management.  There is no other additional requests of the emergency department.  Patient is stable at this time and has no request for pain meds.    I have reviewed the nursing notes.    I have reviewed the findings, diagnosis, plan and need for follow up with the patient.       Medication List      ASK your doctor about these medications    oxyCODONE 5 MG tablet  Commonly known as:  ROXICODONE  2.5 mg, Oral, EVERY 8 HOURS PRN  Ask about: Should I take this medication?            Final diagnoses:   Injury of spleen   IAnish, am serving as a trained medical scribe to document services personally performed by Negro Stone MD, based on the provider's statements to me.      INegro MD, was physically present and have reviewed and verified the accuracy of  "this note documented by Anish Bahena.     \"This dictation was performed with the assistance of voice recognition software and may contain inadvertant transcription  errors,  omissions and/or  inadvertent word substitution.\" --Teto Stone MD       5/24/2019   [facility], [place], EMERGENCY DEPARTMENT     Teto Stone MD  05/25/19 0205    "

## 2019-05-25 NOTE — PROGRESS NOTES
Surgery Progress Note  05/25/2019       Subjective:  - Admitted overnight for malaise, found to have splenic abscess vs hematoma, as well as bacturia  - Feels weak and tired  - Denies fevers, chills  - Endorses longstanding difficulty with urination and follows with urology     Objective:  Temp:  [97.4  F (36.3  C)-100.4  F (38  C)] 100.4  F (38  C)  Heart Rate:  [] 91  Resp:  [16-20] 18  BP: (134-153)/(63-78) 139/69  SpO2:  [95 %-97 %] 97 %    No intake/output data recorded.      Gen: Awake, alert, NAD, resting quietly in bed  Resp: NLB on RA  Abd: soft, distended, slightly tender to palpation in left upper quadrant  Ext: WWP, no edema     Labs:  Recent Labs   Lab 05/24/19 1941   WBC 14.7*   HGB 10.5*          Recent Labs   Lab 05/24/19 1941      POTASSIUM 4.2   CHLORIDE 96   CO2 27   BUN 8   CR 0.64*   *   VOLODYMYR 8.8       Imaging:  CT A/P in PACS     Assessment/Plan:   78 year old male with splenic hematoma vs abscess vs autoinfarct of spleen 15 days s/p IR embolectomy after ground level in which he sustained a grade III/IV splenic laceration. Will consult IR for possible drainage of this area. In the meantime will start ABX given slowly uptrending WBC count, suspicion for abscess.     If IR unable to place drain today, will start diet and begin lovenox.     - Pain control w/ APAP, dilaudid, oxycodone  - CV/Pulm: PTA triamterene / hydrochlorothiazide  - NPO, MIVF  - : continue flomax; bacturia likely secondary to obstructive process, will not treat at this time  - Empiric zosyn 05/25- ongoing  - PPX: hold lovenox until post-IR procedure     Seen, examined, and discussed with chief resident, who will discuss with staff.  - - - - - - - - - - - - - - - - - -  Calderon Davis MD  Surgery Resident PGY1  [phone]

## 2019-05-25 NOTE — CONSULTS
INTERVENTIONAL RADIOLOGY CONSULT    HPI: 78 year old male with history of DM, BPH, HTN, and grade IV splenic laceration after ground level fall on 5/10/2019, CT with active extravasation s/p IR embolization on 5/10. He was discharged on 5/16, presented to [facility] urgent care yesterday c/o abdominal bloating, nausea, fatiuge, and poor PO intake.    Patient is on IR schedule for CT drain placement. Labs WNL for procedure. Patient reportedly NPO since midnight. Consent will be done prior to procedure.    Lab Results   Component Value Date    INR 1.13 05/14/2019    INR 1.08 05/10/2019     Lab Results   Component Value Date     05/25/2019     05/24/2019       Mel Irizarry MD  Interventional Radiology Attending   Service Pager [phone]

## 2019-05-25 NOTE — PLAN OF CARE
"/74 (BP Location: Right arm)   Temp 98.5  F (36.9  C) (Oral)   Resp 18   Ht 1.6 m (5' 3\")   Wt 73.3 kg (161 lb 11.2 oz)   SpO2 92%   BMI 28.64 kg/m      Activity: SBA, up to the bathroom several times  Neuros: A&O x4, denies numbness and tingling  Cardiac: WDL  Respiratory: WDL, denies SOB, stable on RA  GI: +BS, +flatus, 3-4 loose BM  : Frequent voids, not saving  Diet: NPO for procedure  Skin: Warm, dry, intact. Bruising on legs from previous fall  Lines: R PIV,  mL/hr  Incisions/Drains: IR procedure today  Labs: None pending  Pain/nausea: Negligable pain, denies nausea. Declined pain meds.  New changes this shift:  Pt left for IR approximately 1415 for drain placement  Plan: Continue to monitor and follow POC.      "

## 2019-05-26 LAB
ANION GAP SERPL CALCULATED.3IONS-SCNC: 6 MMOL/L (ref 3–14)
BUN SERPL-MCNC: 10 MG/DL (ref 7–30)
CALCIUM SERPL-MCNC: 8.5 MG/DL (ref 8.5–10.1)
CHLORIDE SERPL-SCNC: 102 MMOL/L (ref 94–109)
CO2 SERPL-SCNC: 28 MMOL/L (ref 20–32)
CREAT SERPL-MCNC: 0.82 MG/DL (ref 0.66–1.25)
ERYTHROCYTE [DISTWIDTH] IN BLOOD BY AUTOMATED COUNT: 13.9 % (ref 10–15)
GFR SERPL CREATININE-BSD FRML MDRD: 85 ML/MIN/{1.73_M2}
GLUCOSE BLDC GLUCOMTR-MCNC: 168 MG/DL (ref 70–99)
GLUCOSE SERPL-MCNC: 153 MG/DL (ref 70–99)
HCT VFR BLD AUTO: 32.7 % (ref 40–53)
HGB BLD-MCNC: 10.1 G/DL (ref 13.3–17.7)
LACTATE BLD-SCNC: 0.9 MMOL/L (ref 0.7–2)
MCH RBC QN AUTO: 29.2 PG (ref 26.5–33)
MCHC RBC AUTO-ENTMCNC: 30.9 G/DL (ref 31.5–36.5)
MCV RBC AUTO: 95 FL (ref 78–100)
PLATELET # BLD AUTO: 189 10E9/L (ref 150–450)
POTASSIUM SERPL-SCNC: 3.8 MMOL/L (ref 3.4–5.3)
RBC # BLD AUTO: 3.46 10E12/L (ref 4.4–5.9)
SODIUM SERPL-SCNC: 135 MMOL/L (ref 133–144)
WBC # BLD AUTO: 12.7 10E9/L (ref 4–11)

## 2019-05-26 PROCEDURE — 25000128 H RX IP 250 OP 636: Performed by: NURSE PRACTITIONER

## 2019-05-26 PROCEDURE — 25000132 ZZH RX MED GY IP 250 OP 250 PS 637: Performed by: STUDENT IN AN ORGANIZED HEALTH CARE EDUCATION/TRAINING PROGRAM

## 2019-05-26 PROCEDURE — 36415 COLL VENOUS BLD VENIPUNCTURE: CPT | Performed by: SURGERY

## 2019-05-26 PROCEDURE — 85027 COMPLETE CBC AUTOMATED: CPT | Performed by: NURSE PRACTITIONER

## 2019-05-26 PROCEDURE — 12000001 ZZH R&B MED SURG/OB UMMC

## 2019-05-26 PROCEDURE — 80048 BASIC METABOLIC PNL TOTAL CA: CPT | Performed by: NURSE PRACTITIONER

## 2019-05-26 PROCEDURE — 00000146 ZZHCL STATISTIC GLUCOSE BY METER IP

## 2019-05-26 PROCEDURE — 25800030 ZZH RX IP 258 OP 636: Performed by: STUDENT IN AN ORGANIZED HEALTH CARE EDUCATION/TRAINING PROGRAM

## 2019-05-26 PROCEDURE — 25000128 H RX IP 250 OP 636: Performed by: SURGERY

## 2019-05-26 PROCEDURE — 83605 ASSAY OF LACTIC ACID: CPT | Performed by: SURGERY

## 2019-05-26 PROCEDURE — 36415 COLL VENOUS BLD VENIPUNCTURE: CPT | Performed by: NURSE PRACTITIONER

## 2019-05-26 PROCEDURE — 25000128 H RX IP 250 OP 636: Performed by: STUDENT IN AN ORGANIZED HEALTH CARE EDUCATION/TRAINING PROGRAM

## 2019-05-26 RX ORDER — AMOXICILLIN 250 MG
1-2 CAPSULE ORAL
Status: DISCONTINUED | OUTPATIENT
Start: 2019-05-26 | End: 2019-05-28 | Stop reason: HOSPADM

## 2019-05-26 RX ADMIN — PIPERACILLIN SODIUM,TAZOBACTAM SODIUM 4.5 G: 4; .5 INJECTION, POWDER, FOR SOLUTION INTRAVENOUS at 16:08

## 2019-05-26 RX ADMIN — TRIAMTERENE AND HYDROCHLOROTHIAZIDE 1 TABLET: 37.5; 25 TABLET ORAL at 09:02

## 2019-05-26 RX ADMIN — SODIUM CHLORIDE, POTASSIUM CHLORIDE, SODIUM LACTATE AND CALCIUM CHLORIDE: 600; 310; 30; 20 INJECTION, SOLUTION INTRAVENOUS at 16:54

## 2019-05-26 RX ADMIN — OXYCODONE HYDROCHLORIDE 10 MG: 5 TABLET ORAL at 11:42

## 2019-05-26 RX ADMIN — ACETAMINOPHEN 650 MG: 325 TABLET, FILM COATED ORAL at 16:08

## 2019-05-26 RX ADMIN — ACETAMINOPHEN 650 MG: 325 TABLET, FILM COATED ORAL at 00:39

## 2019-05-26 RX ADMIN — ACETAMINOPHEN 650 MG: 325 TABLET, FILM COATED ORAL at 09:02

## 2019-05-26 RX ADMIN — OXYCODONE HYDROCHLORIDE 10 MG: 5 TABLET ORAL at 06:20

## 2019-05-26 RX ADMIN — TAMSULOSIN HYDROCHLORIDE 0.4 MG: 0.4 CAPSULE ORAL at 21:59

## 2019-05-26 RX ADMIN — LORATADINE 10 MG: 10 TABLET ORAL at 09:03

## 2019-05-26 RX ADMIN — PIPERACILLIN SODIUM,TAZOBACTAM SODIUM 4.5 G: 4; .5 INJECTION, POWDER, FOR SOLUTION INTRAVENOUS at 11:21

## 2019-05-26 RX ADMIN — ONDANSETRON 4 MG: 2 INJECTION INTRAMUSCULAR; INTRAVENOUS at 09:16

## 2019-05-26 RX ADMIN — PIPERACILLIN SODIUM,TAZOBACTAM SODIUM 4.5 G: 4; .5 INJECTION, POWDER, FOR SOLUTION INTRAVENOUS at 21:58

## 2019-05-26 RX ADMIN — TAMSULOSIN HYDROCHLORIDE 0.4 MG: 0.4 CAPSULE ORAL at 00:39

## 2019-05-26 RX ADMIN — OXYCODONE HYDROCHLORIDE 10 MG: 5 TABLET ORAL at 00:39

## 2019-05-26 RX ADMIN — OXYCODONE HYDROCHLORIDE 10 MG: 5 TABLET ORAL at 16:08

## 2019-05-26 RX ADMIN — OXYCODONE HYDROCHLORIDE 10 MG: 5 TABLET ORAL at 19:54

## 2019-05-26 RX ADMIN — PIPERACILLIN SODIUM,TAZOBACTAM SODIUM 4.5 G: 4; .5 INJECTION, POWDER, FOR SOLUTION INTRAVENOUS at 04:57

## 2019-05-26 RX ADMIN — ENOXAPARIN SODIUM 40 MG: 40 INJECTION SUBCUTANEOUS at 14:09

## 2019-05-26 ASSESSMENT — ACTIVITIES OF DAILY LIVING (ADL)
ADLS_ACUITY_SCORE: 11

## 2019-05-26 ASSESSMENT — PAIN DESCRIPTION - DESCRIPTORS: DESCRIPTORS: ACHING

## 2019-05-26 NOTE — PROGRESS NOTES
Surgery Progress Note  05/26/2019       Subjective:  - GO overnight, received drain in LUQ.  - Feels weak and tired  - Denies fevers, chills  - Endorses longstanding difficulty with urination and follows with urology     Objective:  Temp:  [97  F (36.1  C)-99  F (37.2  C)] 98.9  F (37.2  C)  Pulse:  [80-92] 80  Heart Rate:  [] 87  Resp:  [13-18] 16  BP: (117-156)/(50-77) 136/72  SpO2:  [92 %-100 %] 97 %    I/O last 3 completed shifts:  In: 2848.33 [P.O.:250; I.V.:2598.33]  Out: 360 [Urine:300; Drains:60]      Gen: Awake, alert, NAD, resting quietly in bed  Resp: NLB on RA  Abd: soft, distended, slightly tender to palpation in left upper quadrant, drain with old dark blood  Ext: WWP, no edema     Labs:  Recent Labs   Lab 05/25/19  0621 05/24/19 1941   WBC 13.9* 14.7*   HGB 10.3* 10.5*    218       Recent Labs   Lab 05/25/19 0621 05/24/19 1941    133   POTASSIUM 4.0 4.2   CHLORIDE 99 96   CO2 26 27   BUN 8 8   CR 0.66 0.64*   * 184*   VOLODYMYR 8.9 8.8   MAG 1.9  --    PHOS 3.5  --        Imaging:  CT A/P in PACS     Assessment/Plan:   78 year old male with splenic hematoma vs abscess vs autoinfarct of spleen 15 days s/p IR embolectomy after ground level in which he sustained a grade III/IV splenic laceration. IR placed drain in perisplenic fluid collection.      - Pain control w/ APAP, dilaudid, oxycodone  - CV/Pulm: PTA triamterene / hydrochlorothiazide  - Diet as tolerated.  - : continue flomax; bacturia likely secondary to obstructive process, will not treat at this time  - Empiric zosyn 05/25- ongoing. May narrow once cultures finalized with sensitivities.     Discussed with staff.    Cesario Guidry, PGY8  [phone]

## 2019-05-26 NOTE — CONSULTS
[facility] ID SERVICE: NEW CONSULTATION  Patient:  Jayden Don, Date of birth 1940, Medical record number [id number]  Date of Admission: 5/24/2019  Date of Visit:  5/26/2019  Requesting Provider: Sebastian Johnson         Assessment and Recommendations:   Problem List:  - Splenic laceration after a fall on 5/10/2019, s/p IR embolization with gel foam and coiling  - Subcapsular hematoma and an intraperitoneal fluid collection, now s/p IR drainage 5/25  - Fever  - Leucocytosis    Discussion:  78 year old male with a recent splenic laceration incurred after a fall on 5/10/2019, s/p IR embolization with gel foam and coiling, now with subcapsular hematoma s/p IR drainage 5/25. Gram stain from fluid culture shows GPBs resembling diphtheroids, cultures pending.     Differential includes splenic hematoma vs. Infected hematoma. No purulence noted at IR drainage. Splenic hematoma can be the source of leukocytosis and fevers. Would await culture from hematoma drainage. If positive, would treat for abscess. If negative, would likely stop antibiotics and observe off antibiotics.     Okay to continue zosyn for now.     Recommendations:  - Continue Zosyn for now  - Await drain cultures to determine whether this is infectious    Recommendations dw primary team.  Plan of care discussed with Staff ID Physician Dr Rin Baez.  Thank you for this consult. The [facility] ID team will continue to follow.  Please feel free to call with any questions.      Paulo Brito  PGY4 Infectious Diseases Fellow  Pager: [phone]      Attestation:  I have reviewed today's vital signs, medications, labs and imaging.  Floor time: 25 minutes, Face-to-face time: 10 minutes, Total time: 35 minutes    Jayden Don was seen in the hospital by Rin Baez on 05/26/2019, with the fellow, Dr. Paulo Brito MD. [facility] Infectious Diseases Fellow. I reviewed the history & exam. Assessment and plan were jointly made.  I agree  with and have edited the fellow's note and plan of care.      Rin Baez MD.  ID Staff  [phone]         History of Present Illness:     78 year old male with a recent splenic laceration incurred after a fall on 5/10/2019, s/p IR embolization with gel foam and coiling. He was discharged on 5/16. He presented again on 5/24 to [facility] urgent care with abdominal bloating, nausea, fatiuge, and poor PO intake. CT abd pelvis showed a subcapsular hematoma and an intraperitoneal fluid collection. He was transferred to [facility] ED for further evaluation. CRP >160. WBC 14.     On 5/25 he underwent IR guided drainage of hematoma, and a drain was placed. 40 ml output over last 24 hours. Gram stain from fluid culture shows GPBs resembling diphtheroids, cultures pending. Was stared on Zosyn 5/25. ID consulted for antibiotic recommendations. Patient reports postop pain, chills. Denies other complaints. Has been febrile to 101.7.            Review of Systems:   CONSTITUTIONAL:  Chills+, Fevers  EYES: Negative for icterus  ENT:  Negative for oral lesions and sore throat  RESPIRATORY:  Negative for cough and dyspnea  CARDIOVASCULAR:  Negative for chest pain, palpitations  GASTROINTESTINAL:  Negative for nausea, vomiting, diarrhea and constipation. Abdominal pain+  GENITOURINARY:  Negative for dysuria  INTEGUMENT:  Negative for rash and pruritus  NEURO:  Negative for headache       Past Medical History:     Past Medical History:   Diagnosis Date     Allergic state      Benign prostatic hyperplasia      Diabetes mellitus type 2, controlled, without complications (H)     controlled with diet and exercise      HTN (hypertension)     controlled with medication     Macular degeneration of right eye     receives injections q8w     Obstructive chronic bronchitis with exacerbation (H)        Allergies:      Allergies   Allergen Reactions     Crestor [Rosuvastatin] Other (See Comments)     Joint Pain     No Known Drug Allergies           "Recent Antimicrobials::     Zosyn       Family History:   Reviewed and noncontributory.   Family History   Problem Relation Age of Onset     C.A.D. Mother      Breast Cancer Mother         uterine      Genetic Disorder Mother         machular degeneration     Neurologic Disorder Father         parkinson     Eye Disorder Brother         photophobia        Social History:     Social History     Socioeconomic History     Marital status:      Spouse name: Not on file     Number of children: Not on file     Years of education: Not on file     Highest education level: Not on file   Occupational History     Not on file   Social Needs     Financial resource strain: Not on file     Food insecurity:     Worry: Not on file     Inability: Not on file     Transportation needs:     Medical: Not on file     Non-medical: Not on file   Tobacco Use     Smoking status: Former Smoker     Smokeless tobacco: Never Used   Substance and Sexual Activity     Alcohol use: Yes     Comment: moderate     Drug use: Never     Sexual activity: Not on file   Lifestyle     Physical activity:     Days per week: Not on file     Minutes per session: Not on file     Stress: Not on file   Relationships     Social connections:     Talks on phone: Not on file     Gets together: Not on file     Attends Holiness service: Not on file     Active member of club or organization: Not on file     Attends meetings of clubs or organizations: Not on file     Relationship status: Not on file     Intimate partner violence:     Fear of current or ex partner: Not on file     Emotionally abused: Not on file     Physically abused: Not on file     Forced sexual activity: Not on file   Other Topics Concern     Not on file   Social History Narrative     Not on file          Physical Exam:   /65 (BP Location: Right arm)   Pulse 103   Temp 98.7  F (37.1  C) (Oral)   Resp 16   Ht 1.6 m (5' 3\")   Wt 73.3 kg (161 lb 11.2 oz)   SpO2 97%   BMI 28.64 kg/m   "   Exam:  GENERAL:  Well-developed, well-nourished, sitting in bed in no acute distress.   ENT:  Head is normocephalic, atraumatic. Oropharynx is moist without exudates or ulcers.  EYES:  Eyes have anicteric sclerae.    NECK:  Supple.  LUNGS:  Clear to auscultation.  CARDIOVASCULAR:  Regular rate and rhythm with no murmurs, gallops or rubs.  ABDOMEN:  Normal bowel sounds, soft, nontender. Ecchymosis over left flank+ Drain in place, no surrounding erythema/ttp. Thick red bloody fluid in bag.  EXT: Extremities warm and without edema.  SKIN:  No acute rashes. Line in place without any surrounding erythema.  NEUROLOGIC:  Grossly nonfocal.         Laboratory Data:     Creatinine   Date Value Ref Range Status   05/26/2019 0.82 0.66 - 1.25 mg/dL Final   05/25/2019 0.66 0.66 - 1.25 mg/dL Final   05/24/2019 0.64 (L) 0.66 - 1.25 mg/dL Final   05/16/2019 0.53 (L) 0.66 - 1.25 mg/dL Final   05/15/2019 0.60 (L) 0.66 - 1.25 mg/dL Final     WBC   Date Value Ref Range Status   05/26/2019 12.7 (H) 4.0 - 11.0 10e9/L Final   05/25/2019 13.9 (H) 4.0 - 11.0 10e9/L Final   05/24/2019 14.7 (H) 4.0 - 11.0 10e9/L Final   05/16/2019 12.3 (H) 4.0 - 11.0 10e9/L Final   05/14/2019 14.3 (H) 4.0 - 11.0 10e9/L Final     Hemoglobin   Date Value Ref Range Status   05/26/2019 10.1 (L) 13.3 - 17.7 g/dL Final     Platelet Count   Date Value Ref Range Status   05/26/2019 189 150 - 450 10e9/L Final     Lab Results   Component Value Date     05/26/2019    BUN 10 05/26/2019    CO2 28 05/26/2019        Pertinent Recent Microbiology Data:     Recent Labs   Lab 05/25/19  1606 05/24/19  1941   CULT Culture negative monitoring continues  Culture negative monitoring continues No growth after 2 days   SDES Abdominal Abscess  Abdominal Abscess  Abdominal Abscess Blood Left Arm     Abscess gram stain 5/25: GPB resembling Diphtheroids           Imaging:     Recent Results (from the past 48 hour(s))   CT Subdiapraghm Abscess Drainage    Narrative    Procedures  5/25/2019:  1. CT-guided drain placement    History: Splenic lacerations secondary to trauma status post splenic  artery embolization with perisplenic hematoma/fluid collection which  is likely infected based on image findings.    Comparison: CT abdomen pelvis, 5/24/2018 (outside study).    Staff: Mel Irizarry M.D.    Fellow: Riley Rubio M.D.    Medications:   1. 100 mcg Fentanyl  2. 2 mg Versed 3. 20 cc 1% lidocaine    Moderate sedation administered by the IR nurse at the supervision of  the attending. Vital signs and oxygenation continuously monitored. The  patient remained stable throughout the procedure.    Sedation time: 73 minutes    CT dose: 814 mGy *cm      Findings/procedure:    Prior to the procedure, both verbal and written informed consent  obtained from the patient. The patient was placed in the supine  position on the CT gantry with the left side rolled up slightly.  Limited preprocedural CT demonstrated adequate percutaneous approach  to the splenic hematoma. The left abdomen and chest were prepped and  draped in usual sterile fashion. Limited ultrasound demonstrated  adequate subcostal percutaneous approach to the perisplenic hematoma.  Timeout was performed.    1% lidocaine is used for local anesthesia and subcostal approach from  the left upper quadrant was attempted with a Bray needle. This  provided access into the inferior and more dense appearing aspect of  the perisplenic hematoma. This was deemed inadequate for appropriate  drainage of the portion of the hematoma that appear infected.    CT-guided intercostal approach was subsequently attempted. The skin  was marked with CT fluoroscopy. The skin and subcutaneous tissues were  anesthetized with 1% lidocaine. Under CT fluoroscopic guidance a  Bray needle was advanced into the hematoma. Wire advanced into the  hematoma. Needle removed and skin tract serially dilated to 12 Yemeni.  12 Yemeni locking pigtail skater placed into  hematoma. Small amount of  liquefied old blood was aspirated and sent to laboratory for analysis.    Drain was sutured in place and sterilely dressed.    The patient tolerated the procedure well. No immediate complication.      Impression    Impression:  12 Hebrew drain placed into the perisplenic hematoma via a left  intercostal approach. Small amount of old appearing blood liquefied  blood sent for Gram stain and culture.    Plan:   Postoperative care as ordered.

## 2019-05-26 NOTE — PROGRESS NOTES
[facility], [place]  Trauma Service Progress Note    Date of Service (when I saw the patient): 05/26/2019     Assessment & Plan     Trauma Mechanism: Ground level fall - re-admitted 05/24 given concern for perisplenic abscess  Date of injury: 05/10/2019  Known Injuries:  1. Grade III splenic laceration                   Other diagnoses:  1. Perisplenic abscess    Procedure(s):  05/10/2019- S/P IR embolization of splenic hematoma  05/26/2019- S/P CT guided subdiaphragmatic abscess 12fr drain placement    Plan:  1. Splenic hematoma/ abscess: S/P drain placement: Tired, febrile, generalized malaise, had 40ml dark reddish thick output from the drain. Nauseated, left flank/ upper quadrant discomfort, abdomen is mildly distended, soft  1. Drain cares: Flush with 10ml 0.9NS every 8 hours  2. Drain bag to gravity  3. Please document output every shift minus irrigant  4. Will discuss with staff need for CTA of the abdomen to evaluate for pseudoaneurysm, possible TPA to drain to help with hematoma drainage  5. Preliminary cultures with gram +bacilli, continue Zosyn for now. ID consult to guide with antibiotic regimen and course.  6. Continue MIVF for now  7. Outpatient follow up plan pending  2. Possible right elbow occult fracture: noted at outside imaging. Imaging results:  Arik, Rad Results In - 05/22/2019 12:41 PM CDT  COMPARISON:  None.    FINDINGS:  Discrete fracture is not identified. However, there is a joint effusion that in the setting of trauma would be suspicious for an occult fracture possibly of the radial head/neck. No dislocations. Degenerative arthrosis of the ulnotrochlear articulation. Mildly prominent enthesophyte at the olecranon process.  1. Denies any new trauma/ falls after discharge. Denies numbness, tingling. ROM as tolerated.  3. Acute traumatic pain: PRN tylenol available.  4. Cardiac: home Maxzide, BP stable, tachy   5. Up with assist  6. : Home Flomax 2/2  BPH  7. Regular diet  8. Resp: pulmonary toilet    Stable Issues:    General Cares:    PPI/H2 blocker:  Regular diet   DVT prophylaxis: Mechanical, up in room   Bowel Regimen/Date of last stool: 05/25 loose BM x 3   Pulmonary toilet: IS   Lines / drains: PIV    Code status:  Full code     Discharge goals:     Adequate pain management: yes    VSS x24 hours: no    Hemoglobin stable x 48 hours: ongoing    Ambulating safely and/or therapy evals complete: N/A    Drains/lines removed or plan in place to manage: yes    Teaching done: drain care teaching pending    Other:  Expected D/C date: 1-2 days    Interval History   Reports nausea after taking pills this morning, left upper abdominal quadrant discomfort. Denies chest pain, mild dyspnea when flat.  ROS x 8 negative with exception of those things listed in interval hx    Physical Exam   Temp: 100.7  F (38.2  C) Temp src: Oral BP: 148/68 Pulse: 103 Heart Rate: 95 Resp: 18 SpO2: 92 % O2 Device: None (Room air) Oxygen Delivery: 2 LPM  Vitals:    05/24/19 1823 05/24/19 2310   Weight: 74.2 kg (163 lb 9.6 oz) 73.3 kg (161 lb 11.2 oz)     Vital Signs with Ranges  Temp:  [97  F (36.1  C)-101.7  F (38.7  C)] 100.7  F (38.2  C)  Pulse:  [] 103  Heart Rate:  [] 95  Resp:  [13-18] 18  BP: (117-156)/(50-77) 148/68  SpO2:  [92 %-100 %] 92 %  I/O last 3 completed shifts:  In: 2848.33 [P.O.:250; I.V.:2598.33]  Out: 360 [Urine:300; Drains:60]    Naz Coma Scale - Total 15/15    Constitutional: Awake, alert, cooperative, no apparent distress.  Eyes: Lids and lashes normal, pupils equal, round and reactive to light, extra ocular muscles intact, sclera clear, conjunctiva normal.  ENT: Normocephalic, atraumatic  Respiratory:  good air exchange, clear to auscultation bilaterally, no crackles or wheezing.  Cardiovascular:  regular rate and rhythm, normal S1 and S2,murmur.   GI: Rounded, soft and distended, LUQ healing ecchymosis  Genitourinary:  Not seen  Skin:  Left flank  ecchymosis, LUQ drain site with shadow drainage, thick dark red bloody output from drain  Musculoskeletal: There is no redness, warmth, or swelling of the joints.  Pedal pulse palpated.  Neurologic: Awake, alert, oriented. Cranial nerves II-XII are grossly intact.  Strength and sensory is intact. No focal deficits.  Neuropsychiatric: Calm, normal eye contact, alert, affect appropriate to situation, oriented, thought process normal.    PARMINDER Napoles CNP  To contact the trauma service use job code pager [phone],   Numeric texts or alpha text through [facility]

## 2019-05-26 NOTE — PLAN OF CARE
"/72 (BP Location: Left arm)   Pulse 80   Temp 98.9  F (37.2  C) (Oral)   Resp 16   Ht 1.6 m (5' 3\")   Wt 73.3 kg (161 lb 11.2 oz)   SpO2 97%   BMI 28.64 kg/m      Pt w/ Max temp of 98.9 throughout 4 hours shift. Other VSS. 97% on RA. Pt lethargic throughout entirety of shift but A&O x4. L drain w/ 40 mL dark red output during shift. Pt encouraged to eat food and was able to wake up enough to eat dinner. Pt c/o L sided abdominal pain but d/t lethargy was not given any pain meds throughout shift. Continue plan of care   "

## 2019-05-26 NOTE — PLAN OF CARE
"Activity: Transfers with assist x1.   Neuros: Alert and oriented x4.   Cardiac: Blood Pressure 136/72 (BP Location: Left arm)   Pulse 80   Temperature 98.9  F (37.2  C) (Oral)   Respiration 16   Height 1.6 m (5' 3\")   Weight 73.3 kg (161 lb 11.2 oz)   Oxygen Saturation 97%   Body Mass Index 28.64 kg/m     Respiratory: Saturation at 96%RA  GI/: Voiding adequately./no flatus or stool. Abdomen semi-firm with hypoactive sound in all quadrant.   Diet: Regular diet.   Skin: Intact.   Lines: Right/ saline locked and Left PIV/infusing  Incisions/Drains: Drain with small bloody output.   Labs: CBC and BMP scheduled this morning.   Pain/nausea: Denying any pain or nausea.   New changes this shift: No new acute event at this time.   Plan: Continue with plan of care.     "

## 2019-05-26 NOTE — PLAN OF CARE
"/65 (BP Location: Right arm)   Pulse 103   Temp 98.7  F (37.1  C) (Oral)   Resp 16   Ht 1.6 m (5' 3\")   Wt 73.3 kg (161 lb 11.2 oz)   SpO2 97%   BMI 28.64 kg/m      Activity: SBA, up to the bathroom several times voiding  Neuros: A&O x4, denies numbness and tingling  Cardiac: WDL, tachy at times  Respiratory: WDL, denies SOB, stable on RA  GI: +BS, +flatus, no BM this shift  : Frequent voids, not saving  Diet: Regular diet, appears to be tolerating  Skin: Warm, dry, intact. Bruising on legs from previous fall  Lines: R PIV,  mL/hr  Incisions/Drains: L abdominal drain, dark red/bloody drainage  Labs: Lactic 0.9 this morning  Pain/nausea: Intermittent pain, oxy given x 1. Mild nausea this morning, zofran given with relief.  New changes this shift:  Irrigation of drain q 8 hours  Plan: Continue to monitor and follow POC.            "

## 2019-05-26 NOTE — PLAN OF CARE
S/p  SUBDIAPHRAGM ABSCESS DRAIN W CATH placement on the left side of the abdomen. Drain is intact with small bloody output. Alert and oriented x 4.Vital signs stable. Abdominal pain is improved after received IV Dilaudid. Oxycodone and Tylenol administered. Ambulated to bathroom with assist of one person. Voided twice. Ordered dinner. Resting in bed. Call light within reach. Plan:Continue care.

## 2019-05-26 NOTE — PROGRESS NOTES
Brief trauma follow up note:  D: POD 1 S/P left splenic abscess drain placement.   Discussed with staff if need for TPA for hematoma drainage would be OK with CTA of the abdomen to eval for pseudoaneurysm.  CBC stable.  Temp: 100.7  F (38.2  C) Temp src: Oral BP: 148/68 Pulse: 103 Heart Rate: 95 Resp: 18 SpO2: 92 % O2 Device: None (Room air) Oxygen Delivery: 2 LPM  P: OK to start DVT prophylaxis    Thais Goss [facility]  Trauma Services  Pager [phone]

## 2019-05-27 ENCOUNTER — APPOINTMENT (OUTPATIENT)
Dept: CT IMAGING | Facility: CLINIC | Age: 79
DRG: 863 | End: 2019-05-27
Attending: NURSE PRACTITIONER
Payer: COMMERCIAL

## 2019-05-27 LAB
ANION GAP SERPL CALCULATED.3IONS-SCNC: 7 MMOL/L (ref 3–14)
BACTERIA SPEC CULT: ABNORMAL
BUN SERPL-MCNC: 10 MG/DL (ref 7–30)
CALCIUM SERPL-MCNC: 8 MG/DL (ref 8.5–10.1)
CHLORIDE SERPL-SCNC: 100 MMOL/L (ref 94–109)
CO2 SERPL-SCNC: 26 MMOL/L (ref 20–32)
CREAT SERPL-MCNC: 0.85 MG/DL (ref 0.66–1.25)
ERYTHROCYTE [DISTWIDTH] IN BLOOD BY AUTOMATED COUNT: 13.8 % (ref 10–15)
GFR SERPL CREATININE-BSD FRML MDRD: 84 ML/MIN/{1.73_M2}
GLUCOSE BLDC GLUCOMTR-MCNC: 121 MG/DL (ref 70–99)
GLUCOSE BLDC GLUCOMTR-MCNC: 137 MG/DL (ref 70–99)
GLUCOSE BLDC GLUCOMTR-MCNC: 139 MG/DL (ref 70–99)
GLUCOSE BLDC GLUCOMTR-MCNC: 177 MG/DL (ref 70–99)
GLUCOSE BLDC GLUCOMTR-MCNC: 207 MG/DL (ref 70–99)
GLUCOSE SERPL-MCNC: 142 MG/DL (ref 70–99)
HCT VFR BLD AUTO: 30.4 % (ref 40–53)
HGB BLD-MCNC: 9.3 G/DL (ref 13.3–17.7)
MCH RBC QN AUTO: 28.7 PG (ref 26.5–33)
MCHC RBC AUTO-ENTMCNC: 30.6 G/DL (ref 31.5–36.5)
MCV RBC AUTO: 94 FL (ref 78–100)
PLATELET # BLD AUTO: 187 10E9/L (ref 150–450)
POTASSIUM SERPL-SCNC: 3.5 MMOL/L (ref 3.4–5.3)
RBC # BLD AUTO: 3.24 10E12/L (ref 4.4–5.9)
SODIUM SERPL-SCNC: 133 MMOL/L (ref 133–144)
SPECIMEN SOURCE: ABNORMAL
WBC # BLD AUTO: 9.8 10E9/L (ref 4–11)

## 2019-05-27 PROCEDURE — 80048 BASIC METABOLIC PNL TOTAL CA: CPT | Performed by: NURSE PRACTITIONER

## 2019-05-27 PROCEDURE — 25000132 ZZH RX MED GY IP 250 OP 250 PS 637: Performed by: NURSE PRACTITIONER

## 2019-05-27 PROCEDURE — 74174 CTA ABD&PLVS W/CONTRAST: CPT

## 2019-05-27 PROCEDURE — 36415 COLL VENOUS BLD VENIPUNCTURE: CPT | Performed by: NURSE PRACTITIONER

## 2019-05-27 PROCEDURE — 25000128 H RX IP 250 OP 636: Performed by: SURGERY

## 2019-05-27 PROCEDURE — 25000131 ZZH RX MED GY IP 250 OP 636 PS 637: Performed by: NURSE PRACTITIONER

## 2019-05-27 PROCEDURE — 25000128 H RX IP 250 OP 636: Performed by: NURSE PRACTITIONER

## 2019-05-27 PROCEDURE — 00000146 ZZHCL STATISTIC GLUCOSE BY METER IP

## 2019-05-27 PROCEDURE — 85027 COMPLETE CBC AUTOMATED: CPT | Performed by: NURSE PRACTITIONER

## 2019-05-27 PROCEDURE — 12000001 ZZH R&B MED SURG/OB UMMC

## 2019-05-27 PROCEDURE — 25000128 H RX IP 250 OP 636: Performed by: STUDENT IN AN ORGANIZED HEALTH CARE EDUCATION/TRAINING PROGRAM

## 2019-05-27 PROCEDURE — 25000132 ZZH RX MED GY IP 250 OP 250 PS 637: Performed by: STUDENT IN AN ORGANIZED HEALTH CARE EDUCATION/TRAINING PROGRAM

## 2019-05-27 RX ORDER — NICOTINE POLACRILEX 4 MG
15-30 LOZENGE BUCCAL
Status: DISCONTINUED | OUTPATIENT
Start: 2019-05-27 | End: 2019-05-28 | Stop reason: HOSPADM

## 2019-05-27 RX ORDER — LIDOCAINE 4 G/G
1 PATCH TOPICAL
Status: DISCONTINUED | OUTPATIENT
Start: 2019-05-27 | End: 2019-05-28 | Stop reason: HOSPADM

## 2019-05-27 RX ORDER — IOPAMIDOL 755 MG/ML
100 INJECTION, SOLUTION INTRAVASCULAR ONCE
Status: COMPLETED | OUTPATIENT
Start: 2019-05-27 | End: 2019-05-27

## 2019-05-27 RX ORDER — POLYETHYLENE GLYCOL 3350 17 G/17G
17 POWDER, FOR SOLUTION ORAL DAILY
Status: DISCONTINUED | OUTPATIENT
Start: 2019-05-27 | End: 2019-05-28 | Stop reason: HOSPADM

## 2019-05-27 RX ORDER — DEXTROSE MONOHYDRATE 25 G/50ML
25-50 INJECTION, SOLUTION INTRAVENOUS
Status: DISCONTINUED | OUTPATIENT
Start: 2019-05-27 | End: 2019-05-28 | Stop reason: HOSPADM

## 2019-05-27 RX ORDER — OXYCODONE HYDROCHLORIDE 5 MG/1
5 TABLET ORAL
Status: DISCONTINUED | OUTPATIENT
Start: 2019-05-27 | End: 2019-05-27

## 2019-05-27 RX ADMIN — ENOXAPARIN SODIUM 40 MG: 40 INJECTION SUBCUTANEOUS at 14:14

## 2019-05-27 RX ADMIN — OXYCODONE HYDROCHLORIDE 5 MG: 5 TABLET ORAL at 08:25

## 2019-05-27 RX ADMIN — PIPERACILLIN SODIUM,TAZOBACTAM SODIUM 4.5 G: 4; .5 INJECTION, POWDER, FOR SOLUTION INTRAVENOUS at 10:40

## 2019-05-27 RX ADMIN — OXYCODONE HYDROCHLORIDE 10 MG: 5 TABLET ORAL at 03:19

## 2019-05-27 RX ADMIN — PIPERACILLIN SODIUM,TAZOBACTAM SODIUM 4.5 G: 4; .5 INJECTION, POWDER, FOR SOLUTION INTRAVENOUS at 23:02

## 2019-05-27 RX ADMIN — ACETAMINOPHEN 650 MG: 325 TABLET, FILM COATED ORAL at 08:25

## 2019-05-27 RX ADMIN — IOPAMIDOL 100 ML: 755 INJECTION, SOLUTION INTRAVENOUS at 09:51

## 2019-05-27 RX ADMIN — LORATADINE 10 MG: 10 TABLET ORAL at 08:26

## 2019-05-27 RX ADMIN — TRIAMTERENE AND HYDROCHLOROTHIAZIDE 1 TABLET: 37.5; 25 TABLET ORAL at 08:26

## 2019-05-27 RX ADMIN — PIPERACILLIN SODIUM,TAZOBACTAM SODIUM 4.5 G: 4; .5 INJECTION, POWDER, FOR SOLUTION INTRAVENOUS at 04:36

## 2019-05-27 RX ADMIN — ACETAMINOPHEN 650 MG: 325 TABLET, FILM COATED ORAL at 17:46

## 2019-05-27 RX ADMIN — TAMSULOSIN HYDROCHLORIDE 0.4 MG: 0.4 CAPSULE ORAL at 23:02

## 2019-05-27 RX ADMIN — LIDOCAINE 1 PATCH: 560 PATCH PERCUTANEOUS; TOPICAL; TRANSDERMAL at 08:27

## 2019-05-27 RX ADMIN — INSULIN ASPART 2 UNITS: 100 INJECTION, SOLUTION INTRAVENOUS; SUBCUTANEOUS at 17:08

## 2019-05-27 RX ADMIN — POLYETHYLENE GLYCOL 3350 17 G: 17 POWDER, FOR SOLUTION ORAL at 14:13

## 2019-05-27 RX ADMIN — PIPERACILLIN SODIUM,TAZOBACTAM SODIUM 4.5 G: 4; .5 INJECTION, POWDER, FOR SOLUTION INTRAVENOUS at 16:50

## 2019-05-27 ASSESSMENT — ACTIVITIES OF DAILY LIVING (ADL)
ADLS_ACUITY_SCORE: 11
RETIRED_COMMUNICATION: 0-->UNDERSTANDS/COMMUNICATES WITHOUT DIFFICULTY
ADLS_ACUITY_SCORE: 11
SWALLOWING: 0-->SWALLOWS FOODS/LIQUIDS WITHOUT DIFFICULTY
BATHING: 0-->INDEPENDENT
NUMBER_OF_TIMES_PATIENT_HAS_FALLEN_WITHIN_LAST_SIX_MONTHS: 1
DRESS: 0-->INDEPENDENT
ADLS_ACUITY_SCORE: 11
TRANSFERRING: 0-->INDEPENDENT
ADLS_ACUITY_SCORE: 11
COGNITION: 0 - NO COGNITION ISSUES REPORTED
ADLS_ACUITY_SCORE: 11
AMBULATION: 0-->INDEPENDENT
TOILETING: 0-->INDEPENDENT
ADLS_ACUITY_SCORE: 11
RETIRED_EATING: 0-->INDEPENDENT
FALL_HISTORY_WITHIN_LAST_SIX_MONTHS: YES

## 2019-05-27 NOTE — PROGRESS NOTES
"    Interventional Radiology Progress Note     May 27, 2019    ID/subjective: Mr. Don is a 78-year-old male with a history of splenic trauma status post embolization.  He was admitted on 5/24/2019 with abdominal pain and an elevated white count concerning for infection in the embolization bed.  He is status post drain placement on 5/24/2019.  He reports doing better today and has been able to get up and out of bed.  He states that the \"first day he is been interested in doing anything).    Objective: /62 (BP Location: Right arm)   Pulse 103   Temp 98.7  F (37.1  C)   Resp 16   Ht 1.6 m (5' 3\")   Wt 73.3 kg (161 lb 11.2 oz)   SpO2 94%   BMI 28.64 kg/m          Intake/Output Summary (Last 24 hours) at 5/27/2019 1803  Last data filed at 5/27/2019 1731  Gross per 24 hour   Intake 1898.33 ml   Output 20 ml   Net 1878.33 ml       Imaging: CT angiogram of the abdomen and pelvis does not reveal vascular abnormality such as a pseudoaneurysm of the splenic artery that would put the patient at increased risk of bleeding.  The drain placed on 5/24/2018 is in good position.    Physical Exam:  Gen: Alert and oriented x4.  No acute distress.  CV: Regular rate and rhythm.  No murmurs rubs or gallops  Pulmonary: Clear to auscultation bilaterally  Abdomen: Mildly distended.  Soft, nontender.  Resolving bruising on the left thigh and left flank.  Neuro: Grossly intact.  Left upper quadrant drain is draining serosanguineous fluid with the appearance of liquefied old blood.    Assessment/Plan: 78-year-old male with perisplenic hematoma status post splenic artery embolization on 5/10/2019.  This fluid collection/infarcted splenic parenchyma is potentially infected.  Drain was placed on 5/24/2018 which is draining liquefied old blood.  He is clinically improving.  He can be discharged from an interventional radiology standpoint with drain teaching such that he flushes the drain twice jada with 10 cc normal saline.  He can " follow-up with interventional radiology when drain outputs decreased to less than 10 cc/day or in 1 week for possible CT and sinogram.  TPA could be considered if there is a persistent large collection and no drain output.        Riley Rubio  Interventional Radiology Fellow  May 27, 2019    [phone] After Hours

## 2019-05-27 NOTE — PLAN OF CARE
"Activity: Transfer with assist x1   Neuros: Alert and oriented x4.   Cardiac: Blood Pressure 149/65 (BP Location: Right arm)   Pulse 103   Temperature 100  F (37.8  C) (Oral)   Respiration 16   Height 1.6 m (5' 3\")   Weight 73.3 kg (161 lb 11.2 oz)   Oxygen Saturation 90%   Body Mass Index 28.64 kg/m     Respiratory: Saturation at 96%RA.   GI/: Voiding adequately. Passing flatus but no stool.   Diet: Regular diet. Patient will benefit from Consistent moderate carb diet with history of diabetes.   Skin: Generalized bruises/intact.   Lines: Left PIV infusing  Incisions/Drains: Left abdomen incision site dressing intact. Irrigating drain with 10ml of saline/small output.   Labs: BMP and CBC scheduled this morning.   Pain/nausea: Oxycodone for pain. Denying any nausea.   New changes this shift: Pt needs scheduled blood sugar monitoring. Blood sugar currently within 100-200/asymptomatic.    Plan: Continue with plan of care.     "

## 2019-05-27 NOTE — PROGRESS NOTES
[facility], [place]  Trauma Service Progress Note    Date of Service (when I saw the patient): 05/27/2019     Assessment & Plan   Trauma Mechanism: Ground level fall - re-admitted 05/24-concern for perisplenic abscess  Date of injury: 05/10/2019  Known Injuries:  1. Grade III splenic laceration                   Other diagnoses:  1. Perisplenic abscess  2. Hx diverticulosis- also noted on CT imaging  3. Pleural plaques, hx asbestos exposure     Procedure(s):  05/10/2019- S/P IR embolization of splenic hematoma  Spleenic vaccines completed 05/16/19  Pneumovax 0.5mL  Haemophilus vaccine (Hib)   Meningococcal Vaccine (Menactra)  05/26/2019- S/P CT guided subdiaphragmatic abscess 12fr drain placement     Plan:  1. Splenic hematoma/ abscess: S/P drain placement: continues to complain of bloating, generalized malaise, left flank pain, denies nausea, vomiting or diarrhea. Abdomen is distended, no rebound tenderness, fito colored drain output 10 ml x 8 hours, 74ml x 2 4 hours. Low grade temps, tachycardia.  1. Drain cares: Flush with 10ml 0.9NS every 8 hours  2. Drain bag to gravity  3. Please document output every shift minus irrigant  4. Preliminary cultures with gram +bacilli, continue Zosyn for now until final cultures and sensitivities. ID consult to guide with appropriate antibiotic regimen and course.  5. CTA abdomen and pelvis completed today to evaluate for splenic artery pseudoaneurysm- no evidence of splenic artery or abdominal artery aneurysm, no substantial change in the appearance of retroperitoneal fluid collection. Discussed with IR fellow, continue with NS flushes for now if drain output continues, will need to follow up at the Interventional Radiology Clinic with a repeat CT or Sinogram or sooner with a reduced output of <10ml/24 hours. (follow up time fame to be determined)  6. PLC drain are teaching ordered prior to discharge  2. Possible right elbow occult fracture, joint  effusion: noted with outside imaging. Imaging results:  Arik, Rad Results In - 05/22/2019 12:41 PM CDT    FINDINGS:  Discrete fracture is not identified. However, there is a joint effusion that in the setting of trauma would be suspicious for an occult fracture possibly of the radial head/neck. No dislocations. Degenerative arthrosis of the ulnotrochlear articulation. Mildly prominent enthesophyte at the olecranon process.  Fluid was aspirated at the time and sent for culture, no growth noted from culture results ([facility]), not enough aspirated for crystal analysis. Blood uric acid WNL.  1. Denies any new trauma/ falls after discharge. Denies numbness, tingling, no shoulder or wrist pain. ROM as tolerated.  2. He was recommended to follow up with Dr. Griffin in Orthopedics in 1 week.   4. Acute traumatic pain: PRN tylenol available, Oxycodone for severe pain  5. Cardiac: home Maxzide, BP stable, tachy   6. : Voiding spontaneously, continue home Flomax 2/2 BPH  7. FEN: Saline lock MIVF,   8. GI: Colonic diverticulosis noted on CT imagine without diverticulitis. Patient states he has had issues with diverticulosis previously. Complains of abdominal bloating, periods of constipation vs diarrhea, abdominal discomfort, leukocytosis and malaise on admission. Most of his symptoms most likely inflammatory response secondary to splenic hematoma vs abscess. Given his white count is down trending, is tolerating his diet with out worse pain, will recommend slowly increasing the amount of fiber in his diet and follow up with his primary OP. Will add Miralax to his bowel regimen given recent narcotic use.  9. Resp: pulmonary toilet. Follow up as previous with his outpatient Pulmonologist for pleural plaques and right lobe nodule  10. Ambulate in room and vogt     General Cares:               PPI/H2 blocker:  Regular diet              DVT prophylaxis:Lovenox SQ              Bowel Regimen/Date of last stool: 05/25 loose  BM x 3              Pulmonary toilet: IS              Lines / drains: PIV     Code status:  Full code              Discharge goals:     Adequate pain management: yes    VSS x24 hours: no    Hemoglobin stable x 48 hours: ongoing    Ambulating safely and/or therapy evals complete: N/A    Drains/lines removed or plan in place to manage: yes    Teaching done: will need drain care teaching pending prior to discharge    Other:  Expected D/C date: hopefully tomorrow pending final antibiotic plan, drain care teaching, improved fevers and abdominal exam.    Interval History   Reports ongoing LUQ nagging pain, bloating and abdominal distension. Denies shortness of breath, nausea or diarrhea  ROS x 8 negative with exception of those things listed in interval hx    Physical Exam   Temp: 100.2  F (37.9  C) Temp src: Oral BP: 125/65   Heart Rate: 88 Resp: 15 SpO2: 93 % O2 Device: None (Room air)    Vitals:    05/24/19 1823 05/24/19 2310   Weight: 74.2 kg (163 lb 9.6 oz) 73.3 kg (161 lb 11.2 oz)     Vital Signs with Ranges  Temp:  [98.7  F (37.1  C)-100.5  F (38.1  C)] 100.2  F (37.9  C)  Heart Rate:  [] 88  Resp:  [15-16] 15  BP: (125-149)/(65-70) 125/65  SpO2:  [90 %-97 %] 93 %  I/O last 3 completed shifts:  In: 2470 [P.O.:50; I.V.:2400; Other:20]  Out: 64 [Drains:64]    Naz Coma Scale - Total 15/15  Constitutional: Awake, alert, cooperative, no apparent distress.  Eyes: Lids and lashes normal, pupils equal, round and reactive to light, extra ocular muscles intact, sclera clear, conjunctiva normal.  ENT: Normocephalic, atraumatic  Respiratory:  good air exchange, clear to auscultation bilaterally, no crackles or wheezing.  Cardiovascular:  regular rate and rhythm, normal S1 and S2,murmur.   GI: Rounded, soft and distended, LUQ healing ecchymosis  Genitourinary:  Not seen  Skin:  Left flank with healing ecchymosis, LUQ drain site with shadow drainage, thin fito colored drain output  Musculoskeletal: There is no  redness, warmth, or swelling of the joints.  Pedal pulse palpated.  Neurologic: Awake, alert, oriented. Cranial nerves II-XII are grossly intact.  Strength and sensory is intact. No focal deficits.  Neuropsychiatric: Calm, normal eye contact, alert, affect appropriate to situation, oriented, thought process normal.    PARMINDER Napoles CNP  To contact the trauma service use job code pager [phone],   Numeric texts or alpha text through [facility]

## 2019-05-27 NOTE — PLAN OF CARE
Alert and oriented x 4. Vital signs stable. On room air. Denies pain. Good oral intake. Iv saline jb.Ambulates with stand by assist. Left flank drain is intact with small bloody output. Had a visit from family.Plan:continue care.

## 2019-05-28 ENCOUNTER — PATIENT OUTREACH (OUTPATIENT)
Dept: CARE COORDINATION | Facility: CLINIC | Age: 79
End: 2019-05-28

## 2019-05-28 VITALS
HEIGHT: 63 IN | DIASTOLIC BLOOD PRESSURE: 60 MMHG | BODY MASS INDEX: 28.65 KG/M2 | SYSTOLIC BLOOD PRESSURE: 121 MMHG | RESPIRATION RATE: 16 BRPM | HEART RATE: 84 BPM | OXYGEN SATURATION: 95 % | TEMPERATURE: 96.4 F | WEIGHT: 161.7 LBS

## 2019-05-28 LAB
ERYTHROCYTE [DISTWIDTH] IN BLOOD BY AUTOMATED COUNT: 13.9 % (ref 10–15)
GLUCOSE BLDC GLUCOMTR-MCNC: 130 MG/DL (ref 70–99)
GLUCOSE BLDC GLUCOMTR-MCNC: 133 MG/DL (ref 70–99)
GLUCOSE BLDC GLUCOMTR-MCNC: 256 MG/DL (ref 70–99)
HCT VFR BLD AUTO: 33.4 % (ref 40–53)
HGB BLD-MCNC: 10.4 G/DL (ref 13.3–17.7)
MCH RBC QN AUTO: 28.7 PG (ref 26.5–33)
MCHC RBC AUTO-ENTMCNC: 31.1 G/DL (ref 31.5–36.5)
MCV RBC AUTO: 92 FL (ref 78–100)
PLATELET # BLD AUTO: 256 10E9/L (ref 150–450)
RBC # BLD AUTO: 3.63 10E12/L (ref 4.4–5.9)
WBC # BLD AUTO: 9.2 10E9/L (ref 4–11)

## 2019-05-28 PROCEDURE — 25000128 H RX IP 250 OP 636: Performed by: STUDENT IN AN ORGANIZED HEALTH CARE EDUCATION/TRAINING PROGRAM

## 2019-05-28 PROCEDURE — 25000128 H RX IP 250 OP 636: Performed by: NURSE PRACTITIONER

## 2019-05-28 PROCEDURE — 36415 COLL VENOUS BLD VENIPUNCTURE: CPT | Performed by: NURSE PRACTITIONER

## 2019-05-28 PROCEDURE — 99239 HOSP IP/OBS DSCHRG MGMT >30: CPT | Performed by: NURSE PRACTITIONER

## 2019-05-28 PROCEDURE — 85027 COMPLETE CBC AUTOMATED: CPT | Performed by: NURSE PRACTITIONER

## 2019-05-28 PROCEDURE — 25000128 H RX IP 250 OP 636: Performed by: SURGERY

## 2019-05-28 PROCEDURE — 00000146 ZZHCL STATISTIC GLUCOSE BY METER IP

## 2019-05-28 PROCEDURE — 25000132 ZZH RX MED GY IP 250 OP 250 PS 637: Performed by: STUDENT IN AN ORGANIZED HEALTH CARE EDUCATION/TRAINING PROGRAM

## 2019-05-28 RX ORDER — AMOXICILLIN 875 MG
875 TABLET ORAL 2 TIMES DAILY
Qty: 28 TABLET | Refills: 0 | Status: SHIPPED | OUTPATIENT
Start: 2019-05-28 | End: 2019-06-11

## 2019-05-28 RX ORDER — OXYCODONE HYDROCHLORIDE 5 MG/1
2.5 TABLET ORAL EVERY 8 HOURS PRN
Qty: 8 TABLET | Refills: 0 | Status: SHIPPED | OUTPATIENT
Start: 2019-05-28 | End: 2019-09-05

## 2019-05-28 RX ADMIN — TRIAMTERENE AND HYDROCHLOROTHIAZIDE 1 TABLET: 37.5; 25 TABLET ORAL at 08:33

## 2019-05-28 RX ADMIN — ENOXAPARIN SODIUM 40 MG: 40 INJECTION SUBCUTANEOUS at 12:31

## 2019-05-28 RX ADMIN — ACETAMINOPHEN 650 MG: 325 TABLET, FILM COATED ORAL at 05:22

## 2019-05-28 RX ADMIN — INSULIN ASPART 3 UNITS: 100 INJECTION, SOLUTION INTRAVENOUS; SUBCUTANEOUS at 12:31

## 2019-05-28 RX ADMIN — ONDANSETRON 4 MG: 2 INJECTION INTRAMUSCULAR; INTRAVENOUS at 05:26

## 2019-05-28 RX ADMIN — PIPERACILLIN SODIUM,TAZOBACTAM SODIUM 4.5 G: 4; .5 INJECTION, POWDER, FOR SOLUTION INTRAVENOUS at 05:06

## 2019-05-28 RX ADMIN — PROCHLORPERAZINE EDISYLATE 5 MG: 5 INJECTION INTRAMUSCULAR; INTRAVENOUS at 06:57

## 2019-05-28 RX ADMIN — LORATADINE 10 MG: 10 TABLET ORAL at 08:33

## 2019-05-28 RX ADMIN — PIPERACILLIN SODIUM,TAZOBACTAM SODIUM 4.5 G: 4; .5 INJECTION, POWDER, FOR SOLUTION INTRAVENOUS at 10:36

## 2019-05-28 ASSESSMENT — ACTIVITIES OF DAILY LIVING (ADL)
ADLS_ACUITY_SCORE: 11

## 2019-05-28 NOTE — PLAN OF CARE
Activity: SBA, up to the bathroom and ambulating in halls x1  Neuros: A&O x4, denies numbness and tingling  Cardiac: WDL  Respiratory: WDL, denies SOB, stable on RA  GI: +BS,+flatus, BM x2  : Frequent voids, not saving  Diet: Regular diet, appears to be tolerating  Skin: Warm, dry, intact. Bruising on legs from previous fall  Lines: R PIV SL, infusing for antibiotics.  Incisions/Drains: L abdominal drain, dark red/bloody drainage  Labs: None pending  Pain/nausea: Tylenol given x1 for pain, denies nausea.  New changes this shift:  Irrigation of drain TID 10 mL NS  Plan: Continue to monitor and follow POC.

## 2019-05-28 NOTE — PROGRESS NOTES
[facility], [place]  Trauma Service Progress Note    Date of Service (when I saw the patient): 05/28/2019       Trauma Mechanism: Ground level fall - re-admitted 05/24-concern for perisplenic abscess  Date of injury: 05/10/2019  Known Injuries:  1. Grade III splenic laceration     Other diagnoses:  1. Perisplenic abscess  2. Hx diverticulosis- also noted on CT imaging  3. Pleural plaques, hx asbestos exposure       Procedure(s):  05/10/2019- S/P IR embolization of splenic hematoma  Spleenic vaccines completed 05/16/19  Pneumovax 0.5mL  Haemophilus vaccine (Hib)   Meningococcal Vaccine (Menactra)    05/26/2019- S/P CT guided subdiaphragmatic abscess 12fr drain placement     Plan:  1. Splenic hematoma/ abscess: S/P drain placement: continues to complain of bloating, generalized malaise, left flank pain, denies nausea, vomiting or diarrhea. Abdomen is distended, no rebound tenderness.  1. Drain cares: Flush with 10ml 0.9NS every 8 hours  2. Drain bag to gravity  3. Please document output every shift minus irrigant  4. Preliminary cultures with gram +bacilli, continue Zosyn for now until final cultures and sensitivities. ID consult to guide with appropriate antibiotic regimen and course.  5. CTA abdomen and pelvis completed to evaluate for splenic artery pseudoaneurysm- no evidence of splenic artery or abdominal artery aneurysm, no substantial change in the appearance of retroperitoneal fluid collection. Continue with NS flushes if drain output continues, will need to follow up at the Interventional Radiology Clinic with a repeat CT or Sinogram or sooner with a reduced output of <10ml/24 hours.   6. PLC drain are teaching ordered prior to discharge    2. Possible right elbow occult fracture, joint effusion: noted with outside imaging. Imaging results:  Arik, Rad Results In - 05/22/2019 12:41 PM CDT    FINDINGS:  Discrete fracture is not identified. However, there is a joint effusion that in  the setting of trauma would be suspicious for an occult fracture possibly of the radial head/neck. No dislocations. Degenerative arthrosis of the ulnotrochlear articulation. Mildly prominent enthesophyte at the olecranon process.  Fluid was aspirated at the time and sent for culture, no growth noted from culture results ([facility]), not enough aspirated for crystal analysis. Blood uric acid WNL.  1. Denies any new trauma/ falls after discharge. Denies numbness, tingling, no shoulder or wrist pain. ROM as tolerated.  2. He was recommended to follow up with Dr. Griffin in Orthopedics in 1 week.   4. Acute traumatic pain: PRN tylenol available, Oxycodone for severe pain  5. Cardiac: home Maxzide, BP stable, tachy   6. : Voiding spontaneously, continue home Flomax 2/2 BPH  7. FEN: Saline lock MIVF,   8. GI: Colonic diverticulosis noted on CT imagine without diverticulitis. Patient states he has had issues with diverticulosis previously. Complains of abdominal bloating, periods of constipation vs diarrhea, abdominal discomfort, leukocytosis and malaise on admission. Most of his symptoms most likely inflammatory response secondary to splenic hematoma vs abscess. Given his white count is down trending, is tolerating his diet with out worse pain, will recommend slowly increasing the amount of fiber in his diet and follow up with his primary OP. Will add Miralax to his bowel regimen given recent narcotic use.  9. Resp: pulmonary toilet. Follow up as previous with his outpatient Pulmonologist for pleural plaques and right lobe nodule  10. Ambulate in room and vogt       General Cares:    PPI/H2 blocker: not indicated   DVT prophylaxis: SCD while in bed   Bowel Regimen/Date of last stool: 5/28/2019, on bowel regimine   Pulmonary toilet: incentive spirometry    ETOH screen: 3-4 drinks a week. No interventions required   Lines / drains: right abdominal drain    Code status:  Full code     Discharge goals:     Adequate  pain management: pain managed with tylenol    VSS x24 hours: yes    Hemoglobin stable x 48 hours: ongoing, will check Hgb now    Ambulating safely and/or therapy evals complete: yes    Drains/lines removed or plan in place to manage: drain care and flushes prior to discharge    Teaching done: as above      Expected D/C date: Pending Hgb. More than likely 5/29/2019      Interval History     Eating PO, feels nauseated, fatigued and general malaise      Physical Exam   Temp: 96.7  F (35.9  C) Temp src: Oral BP: 136/70 Pulse: 90 Heart Rate: 74 Resp: 16 SpO2: 94 % O2 Device: None (Room air)    Vitals:    05/24/19 1823 05/24/19 2310   Weight: 74.2 kg (163 lb 9.6 oz) 73.3 kg (161 lb 11.2 oz)     Vital Signs with Ranges  Temp:  [96.7  F (35.9  C)-98.7  F (37.1  C)] 96.7  F (35.9  C)  Pulse:  [74-90] 90  Heart Rate:  [71-99] 74  Resp:  [16] 16  BP: (108-136)/(60-70) 136/70  SpO2:  [92 %-94 %] 94 %  I/O last 3 completed shifts:  In: 2700 [P.O.:2470; I.V.:200; Other:30]  Out: 16 [Urine:1; Drains:15]    Madison Coma Scale - Total 15/15  Eye Response (E): 4  4= spontaneous, 3= to verbal/voice, 2= to pain, 1= No response   Verbal Response (V): 5  5= Orientated, converses, 4= Confused, converses, 3= Inappropriate words, 2= Incomprehensible sounds, 1=No response   Motor Response (M): 6  6= Obeys commands, 5= Localizes to pain, 4= Withdrawal to pain, 3=Fexion to pain, 2= Extension to pain, 1= No response     Constitutional: Awake, alert, cooperative, no apparent distress.  Eyes: Lids and lashes normal, pupils equal, round and reactive to light, extra ocular muscles intact, sclera clear, conjunctiva normal.  ENT: Normocephalic, atraumatic  Respiratory: No increased work of breathing, good air exchange, clear to auscultation bilaterally, no crackles or wheezing.  Cardiovascular:  regular rate and rhythm, normal S1 and S2, no S3 or S4, and no murmur.   GI: Normal bowel sounds, abdomen soft, non-distended, non-tender, no guarding. Drain  in place  Genitourinary:  No issues  Skin:  Normal skin color, no redness, warmth, or swelling, no ecchymosis, no abrasions, and no jaundice.  Musculoskeletal: There is no redness, warmth, or swelling of the joints.  Pedal pulse palpated.  Neurologic: Awake, alert, oriented. Cranial nerves II-XII are grossly intact.  Strength and sensory is intact. No focal deficits.  Neuropsychiatric: Calm, normal eye contact, alert, affect appropriate to situation, oriented, thought process normal.    PARMINDER Barnett CNS  To contact the trauma service use job code pager [phone],   Numeric texts or alpha text through [facility]

## 2019-05-28 NOTE — PLAN OF CARE
[phone]  VSS. A&Ox4. Up SBA. Walking halls. Voiding and had BM. Tolerating reg diet. No nausea today, denies pain. L abd drain with small sanguinous output. Irrigating drain q8h. PLC came to do drain teaching with pt and wife. Pt discharging home on po abx. Reviewed discharge instructions, follow up appointments, and drain cares. Given supplies. Pt and wife verbalized understanding. Wife providing ride home

## 2019-05-28 NOTE — PROGRESS NOTES
[facility] ID Service: Follow Up Note      Patient:  Jayden Don, Date of birth 1940, Medical record number [id number]  Date of Visit:  May 28, 2019         Assessment and Recommendations:     Problem List:  1. Splenic laceration after a fall on 5/10/2019, s/p IR embolization with gel foam and coiling  2. Infected retroperitoneal hematoma, Propionibacterium species, s/p IR drainage 5/24  3. Fever and leukocytosis, resolved     Recommendations:  1. Discontinue pip-tazo  2. Start amoxicillin 875mg PO BID, infected hematoma with Propionibacterium   - Plan 2 weeks of antibiotic therapy, end date 6/11/19  3. Check CRP prior to discharge  4. Please schedule follow-up in ID clinic in ~2 weeks, any available provider   - Please repeat CRP and abdominal CT (if not already completed by IR) prior to ID appointment    Discussion:  78 year old male with a recent splenic laceration incurred after a fall on 5/10/2019, s/p IR embolization with gel foam and coiling, now with subcapsular hematoma s/p IR drainage 5/24. No purulence noted at IR drainage and splenic hematoma can be the source of leukocytosis and fevers. However, cultures now with heavy growth of Propionibacterium, identified by MALD-TOF. This is an unexpected cause of infected hematoma, but given heavy growth and initial presentation with high fevers, we would recommend treating. Given overall improvement since drain placement, think you could transition to oral antibiotics to complete a course.  Would recommend amoxicillin. Propionibacterium has 100% susceptibility to penicillin with our institutional antibiogram. Would anticipate need for 2 additional weeks of antibiotics and follow-up in the ID clinic. Trending CRP and follow-up imaging will help in evaluating further need for antibiotics.    ID will sign off at this time, given anticipated discharge home and definitive antibiotic plan. Please call if any questions or concerns, or if changes in  status. Always happy to revisit the case.    Kain Kirby  Infectious Diseases ([facility] service)  [phone]            Interval History:      Reports some indigestion.  Otherwise doing much better overall.  Afebrile.    Microbiology:  5/25 Drain fluid Propiobacterium species on anaerobic cultures  5/24 Blood  NGTD         Review of Systems:   CONSTITUTIONAL:  No fevers or chills  EYES: negative for icterus  ENT:  negative for oral lesions, hearing loss, tinnitus and sore throat  RESPIRATORY:  negative for cough and dyspnea  CARDIOVASCULAR:  negative for chest pain, palpitations  GASTROINTESTINAL:  negative for nausea, vomiting, diarrhea and constipation  GENITOURINARY:  negative for dysuria  HEME:  No easy bruising/bleeding  INTEGUMENT:  negative for rash and pruritus  NEURO:  Negative for headache         Current Antimicrobials     Pip-tazo: 5/25-         Physical Exam:   Ranges for vital signs:  Temp:  [96.4  F (35.8  C)-98.7  F (37.1  C)] 96.4  F (35.8  C)  Pulse:  [74-90] 84  Heart Rate:  [71-86] 74  Resp:  [16] 16  BP: (117-136)/(60-70) 121/60  SpO2:  [92 %-95 %] 95 %    Intake/Output Summary (Last 24 hours) at 5/28/2019 1418  Last data filed at 5/28/2019 1200  Gross per 24 hour   Intake 2580 ml   Output 36 ml   Net 2544 ml     Exam:  GENERAL:  Well-developed, well-nourished, sitting in bed in no acute distress.   ENT:  Head is normocephalic, atraumatic. Oropharynx is moist without exudates or ulcers.  EYES:  Eyes have anicteric sclerae.    NECK:  Supple.  LUNGS:  Clear to auscultation.  CARDIOVASCULAR:  Regular rate and rhythm with no murmurs, gallops or rubs.  ABDOMEN:  Normal bowel sounds, soft, nontender. Ecchymosis over left flank+ Drain in place, no surrounding erythema/ttp. Thick red bloody fluid in bag.  EXT: Extremities warm and without edema.  SKIN:  No acute rashes. Line in place without any surrounding erythema.  NEUROLOGIC:  Grossly nonfocal.         Laboratory Data:     Creatinine   Date Value Ref  Range Status   05/27/2019 0.85 0.66 - 1.25 mg/dL Final   05/26/2019 0.82 0.66 - 1.25 mg/dL Final   05/25/2019 0.66 0.66 - 1.25 mg/dL Final   05/24/2019 0.64 (L) 0.66 - 1.25 mg/dL Final   05/16/2019 0.53 (L) 0.66 - 1.25 mg/dL Final     WBC   Date Value Ref Range Status   05/28/2019 9.2 4.0 - 11.0 10e9/L Final   05/27/2019 9.8 4.0 - 11.0 10e9/L Final   05/26/2019 12.7 (H) 4.0 - 11.0 10e9/L Final   05/25/2019 13.9 (H) 4.0 - 11.0 10e9/L Final   05/24/2019 14.7 (H) 4.0 - 11.0 10e9/L Final     Hemoglobin   Date Value Ref Range Status   05/28/2019 10.4 (L) 13.3 - 17.7 g/dL Final     Platelet Count   Date Value Ref Range Status   05/28/2019 256 150 - 450 10e9/L Final     Sed Rate   Date Value Ref Range Status   09/08/2003 5 0 - 20 mm/h Final     AST   Date Value Ref Range Status   05/24/2019 32 0 - 45 U/L Final   09/08/2003 28 0 - 55 U/L Final   05/29/2003 37 0 - 55 U/L Final   05/16/2003 34 0 - 55 U/L Final     ALT   Date Value Ref Range Status   05/24/2019 31 0 - 70 U/L Final   09/08/2003 27 0 - 70 U/L Final   05/29/2003 34 0 - 70 U/L Final   05/16/2003 32 0 - 70 U/L Final     Bilirubin Total   Date Value Ref Range Status   05/24/2019 1.3 0.2 - 1.3 mg/dL Final   09/08/2003 1.0 0.2 - 1.3 mg/dL Final   05/29/2003 1.2 0.2 - 1.3 mg/dL Final   05/16/2003 1.1 0.2 - 1.3 mg/dL Final     Lab Results   Component Value Date     05/27/2019    BUN 10 05/27/2019    CO2 26 05/27/2019

## 2019-05-28 NOTE — PLAN OF CARE
"/62 (BP Location: Right arm)   Pulse 103   Temp 97.4  F (36.3  C) (Oral)   Resp 16   Ht 1.6 m (5' 3\")   Wt 73.3 kg (161 lb 11.2 oz)   SpO2 94%   BMI 28.64 kg/m        Activity: SBA, up to the bathroom and   Neuros: A&O x4, denies numbness and tingling  Cardiac: WDL  Respiratory: WDL, denies SOB, stable on RA  GI: +BS,+flatus, -BM   : Frequent voids, not saving  Diet: Regular diet  Skin: Warm, dry, intact. Bruising on legs from previous fall  Lines: R PIV SL, infusing for antibiotics.  Incisions/Drains: L abdominal drain, dark red/bloody drainage  Labs: None pending  Pain/nausea: pain adequately controlled. zofran and compazine administered x1 for nausea.  New changes this shift:  Irrigation of drain TID 10 mL NS  Plan: Continue to monitor and follow POC.    "

## 2019-05-28 NOTE — DISCHARGE SUMMARY
[facility], [employer]    Discharge Summary  Trauma Surgery Service      Date of Admission:  5/24/2019  Date of Discharge: 5/28/2019  Discharging Provider: PARMINDER Paredes  Date of Service (when I saw the patient): 5/28/2019    Primary Provider: Violet Guzman  Primary Care clinic: [facility] [address]   [place] [address]  Phone: [phone]  Fax number: [phone]     Discharge Diagnoses   Known Injuries:  1. Grade III splenic laceration     Other diagnoses:  1. Perisplenic abscess  2. Hx diverticulosis- also noted on CT imaging  3. Pleural plaques, hx asbestos exposure        Procedure(s):  05/10/2019- S/P IR embolization of splenic hematoma  Spleenic vaccines completed 05/16/19  Pneumovax 0.5mL  Haemophilus vaccine (Hib)   Meningococcal Vaccine (Menactra)     05/26/2019- S/P CT guided subdiaphragmatic abscess 12fr drain placement         Hospital Course   Mr. Don is a 78-year-old male with a history of a grade IV (radiology read as III) splenic laceration after ground level fall on 5/10/2019, received 1 unit pRBCs and underwent IR embolization with gel foam and coiling. He required one additional unit of blood for low hemoglobin while inpatient and was discharged on 5/16. He was re-admitted on 5/24/2019 with abdominal pain and an elevated white count concerning for infection in the embolization bed.  He is status post drain placement on 5/24/2019. Infectious disease was consulted and he was started on zosyn. He was initially feeling fatigued and weak, but feels much improved today. He was able to tolerate a PO diet without nausea or abdominal pain. He is able to ambulate well. Teaching for home management of drain with flushes was done today and pt and wife verbalizes understanding.       Traumatic Injury  -The patient sustained a Grade III splenic laceration injury as a result of a fall on 5/10/2019.  He was seen by the Trauma Resource Nurse at that timend  injury prevention education was performed.  The mechanism of injury and factors contributing to the accident were discussed with the patient.  Strategies on how to prevent future accidents were reviewed.  The patient underwent tertiary examination to evaluate for additional injuries.  The systematic review did not find any other injuries.      Acute pain  # Discharge Pain Plan:  -during this hospitalization, Jayden experienced abdominal pain. His pain was well managed with tylenol and oxycodone about once a day.  -opoids prescribed on discharge: oxycodone  -pharmacological adjuncts: tylenol     Adequate pain control was achieved with this regimen.  We anticipate that they will taper off this regimen over the next several weeks..      Therapy Recommendations:   Current status of physical therapies on discharge:     Current status of occupational therapies on discharge:    Significant Results and Procedures     05/26/2019- S/P CT guided subdiaphragmatic abscess 12fr drain placement      Code status: Full Code    SUBJECTIVE: Review Of Systems  Skin: negative  Eyes: negative  Ears/Nose/Throat: negative  Respiratory: No shortness of breath, dyspnea on exertion, cough, or hemoptysis  Cardiovascular: negative  Gastrointestinal: denies pain, nausea or vomiting  Genitourinary: negative  Musculoskeletal: negative  Neurologic: negative  Psychiatric: negative  Hematologic/Lymphatic/Immunologic: negative  Endocrine: negative      Physical Exam   Constitutional: He is oriented to person, place, and time. He appears well-developed and well-nourished.   HENT:   Head: Normocephalic and atraumatic.   Eyes: Pupils are equal, round, and reactive to light. EOM are normal.   Neck: Normal range of motion. Neck supple.   Cardiovascular: Normal rate and regular rhythm.   Pulmonary/Chest: Effort normal and breath sounds normal.   Abdominal: Soft. Bowel sounds are normal.   Drain in place   Musculoskeletal: Normal range of motion.  "  Neurological: He is alert and oriented to person, place, and time.   Skin: Skin is warm and dry.   Psychiatric: He has a normal mood and affect. His behavior is normal.       Discharge Disposition   Discharged to home  Condition at discharge: Stable  Discharge VS: Blood pressure 121/60, pulse 84, temperature 96.4  F (35.8  C), temperature source Oral, resp. rate 16, height 1.6 m (5' 3\"), weight 73.3 kg (161 lb 11.2 oz), SpO2 95 %.    Consultations This Hospital Stay   INTERVENTIONAL RADIOLOGY ADULT/PEDS IP CONSULT  VASCULAR ACCESS CARE ADULT IP CONSULT  INFECTIOUS DISEASE GENERAL ADULT IP CONSULT  PATIENT LEARNING CENTER IP CONSULT  CARE COORDINATOR IP CONSULT    Discharge Orders      Reason for your hospital stay    You were admitted for splenic abscess and treated with antibiotics     Adult [facility] Follow-up and recommended labs and tests    Follow up with primary care provider, Violet Guzman, within 7 days for hospital follow- up.     Medication Therapy Management Services  If you have any questions regarding your medications after discharge, this service is available to you.  Please call:  [phone] or [phone] (toll-free)  [facility]  [address] [place] [id number]  [email or web address]  [employer]/pharmacy    Trauma Clinic   [facility]  [address]oor 4  [address] [place] [address]   Appointments: [phone]      Please schedule follow-up in ID clinic in ~2 weeks, any available provider               - Please repeat CRP and abdominal CT (if not already completed by IR) prior to ID appointment  [facility]  [facility]  [address], [address]  [address] [place] [address]  Appointments: [phone]  Information: [phone]      Follow-up with Interventional radiology in 1-2 week  [facility]  (OK to coordinate IR and infectious disease appointments on same day)  [address]oor [address], [address]  [address] [place] [address]  Appointments: [phone]  Information: [phone]        Appointments on [place] and/or [facility] (with [facility] or [facility] provider or service). Call [phone] if you haven't heard regarding these appointments within 7 days of discharge.     Activity    Your activity upon discharge: activity as tolerated     Full Code     Diet    Follow this diet upon discharge: Orders Placed This Encounter      Moderate Consistent CHO Diet     Discharge Medications   Current Discharge Medication List      START taking these medications    Details   amoxicillin (AMOXIL) 875 MG tablet Take 1 tablet (875 mg) by mouth 2 times daily for 14 days  Qty: 28 tablet, Refills: 0    Associated Diagnoses: Splenic abscess         CONTINUE these medications which have CHANGED    Details   oxyCODONE (ROXICODONE) 5 MG tablet Take 0.5 tablets (2.5 mg) by mouth every 8 hours as needed for severe pain  Qty: 8 tablet, Refills: 0    Associated Diagnoses: Laceration of spleen, initial encounter         CONTINUE these medications which have NOT CHANGED    Details   acetaminophen (TYLENOL) 325 MG tablet Take 3 tablets (975 mg) by mouth every 8 hours as needed for mild pain    Associated Diagnoses: Laceration of spleen, initial encounter      polyethylene glycol (MIRALAX/GLYCOLAX) packet Take 17 g by mouth daily  Qty: 14 packet, Refills: 0    Associated Diagnoses: Laceration of spleen, initial encounter      tamsulosin (FLOMAX) 0.4 MG capsule Take 1 capsule (0.4 mg) by mouth At Bedtime  Qty: 30 capsule, Refills: 0    Associated Diagnoses: Benign prostatic hyperplasia without lower urinary tract symptoms      TRIAMTERENE-HCTZ 37.5-25 MG OR CAPS 1 CAPSULE DAILY  Qty: 60, Refills: 0      VITAMIN E 800 IU OR CAPS 1 tablet daily      ANDROGEL 50 MG/5GM TD PACK 1 pack topically q day  Qty: 30, Refills: 11    Comments: last fill date 11/3/03      GLUCOSAMINE-CHONDROITIN 750-600 MG OR TABS 1 tablet at bedtime       loratadine (CLARITIN) 10 MG tablet Take 1 tablet (10 mg) by mouth daily    Associated Diagnoses: Seasonal allergic rhinitis due to pollen      LOTRISONE 1-0.05 % EX CREA apply as directed  Qty: 30 gm tube, Refills: 3    Associated Diagnoses: Dermatophytosis of foot      VIAGRA 100 MG OR TABS 1 TABLET BY MOUTH AS DIRECTED  Qty: 6, Refills: 6         STOP taking these medications       oxyCODONE HCl (OXAYDO) 5 MG TABA Comments:   Reason for Stopping:         ZITHROMAX Z-CHETAN 250 MG OR CAPS Comments:   Reason for Stopping:             Allergies   Allergies   Allergen Reactions     Crestor [Rosuvastatin] Other (See Comments)     Joint Pain     No Known Drug Allergies      Data   Most Recent 3 CBC's:  Recent Labs   Lab Test 05/28/19  1245 05/27/19  0651 05/26/19  0721   WBC 9.2 9.8 12.7*   HGB 10.4* 9.3* 10.1*   MCV 92 94 95    187 189      Most Recent 3 BMP's:  Recent Labs   Lab Test 05/27/19  0651 05/26/19  0721 05/25/19  0621    135 133   POTASSIUM 3.5 3.8 4.0   CHLORIDE 100 102 99   CO2 26 28 26   BUN 10 10 8   CR 0.85 0.82 0.66   ANIONGAP 7 6 8   VOLODYMYR 8.0* 8.5 8.9   * 153* 182*     Most Recent 2 LFT's:  Recent Labs   Lab Test 05/24/19 1941   AST 32   ALT 31   ALKPHOS 78   BILITOTAL 1.3     Most Recent INR's and Anticoagulation Dosing History:  Anticoagulation Dose History     Recent Dosing and Labs Latest Ref Rng & Units 5/10/2019 5/10/2019 5/10/2019 5/14/2019    INR 0.86 - 1.14 0.98 1.08 1.08 1.13        Most Recent 3 Troponin's:No lab results found.  Most Recent 6 Bacteria Isolates From Any Culture (See EPIC Reports for Culture Details):  Recent Labs   Lab Test 05/25/19  1606 05/24/19 1941   CULT Moderate growth  Gram positive bacilli resembling diphtheroids  See anaerobic report for identification  *  Heavy growth  Propionibacterium species  Identification obtained by MALDI-TOF mass spectrometry research use only database. Test   characteristics determined and verified by the Infectious  Dise[facility] Diagnostic [facility]   ([facility]) [place], [place].  Susceptibility testing of Propionibacterium species is not done from this source. Our   antibiogram indicates that Propionibacterum species is susceptible to penicillin and   cefotaxime and most are susceptible to clindamycin.  Wendy Pritchett M.D., Medical Director  *  Culture in progress No growth after 4 days     Most Recent TSH, T4 and A1c Labs:  Recent Labs   Lab Test 05/11/19  0348   A1C 7.6*     Results for orders placed or performed during the hospital encounter of 05/24/19   CT Subdiapraghm Abscess Drainage    Narrative    Procedures 5/25/2019:  1. CT-guided drain placement    History: Splenic lacerations secondary to trauma status post splenic  artery embolization with perisplenic hematoma/fluid collection which  is likely infected based on image findings.    Comparison: CT abdomen pelvis, 5/24/2018 (outside study).    Staff: Mel Irizarry M.D.    Fellow: Riley Rubio M.D.    Medications:   1. 100 mcg Fentanyl  2. 2 mg Versed 3. 20 cc 1% lidocaine    Moderate sedation administered by the IR nurse at the supervision of  the attending. Vital signs and oxygenation continuously monitored. The  patient remained stable throughout the procedure.    Sedation time: 73 minutes    CT dose: 814 mGy *cm      Findings/procedure:    Prior to the procedure, both verbal and written informed consent  obtained from the patient. The patient was placed in the supine  position on the CT gantry with the left side rolled up slightly.  Limited preprocedural CT demonstrated adequate percutaneous approach  to the splenic hematoma. The left abdomen and chest were prepped and  draped in usual sterile fashion. Limited ultrasound demonstrated  adequate subcostal percutaneous approach to the perisplenic hematoma.  Timeout was performed.    1% lidocaine is used for local anesthesia and subcostal approach from  the left upper quadrant was attempted with a Bray needle.  This  provided access into the inferior and more dense appearing aspect of  the perisplenic hematoma. This was deemed inadequate for appropriate  drainage of the portion of the hematoma that appear infected.    CT-guided intercostal approach was subsequently attempted. The skin  was marked with CT fluoroscopy. The skin and subcutaneous tissues were  anesthetized with 1% lidocaine. Under CT fluoroscopic guidance a  Bray needle was advanced into the hematoma. Wire advanced into the  hematoma. Needle removed and skin tract serially dilated to 12 Micronesian.  12 Micronesian locking pigtail skater placed into hematoma. Small amount of  liquefied old blood was aspirated and sent to laboratory for analysis.    Drain was sutured in place and sterilely dressed.    The patient tolerated the procedure well. No immediate complication.      Impression    Impression:  12 Micronesian drain placed into the perisplenic hematoma via a left  intercostal approach. Small amount of old appearing blood liquefied  blood sent for Gram stain and culture.    Plan:   Postoperative care as ordered.   CTA Abdomen Pelvis with Contrast    Narrative    EXAMINATION: CTA ABDOMEN PELVIS WITH CONTRAST, 5/27/2019 9:58 AM    TECHNIQUE:  Helical CT thin section images from the lung bases through  the symphysis pubis were obtained with IV contrast in the arterial  phase. Contrast dose: 100ml isovue 370    COMPARISON: 5/25/2019, 5/12/2019    HISTORY: evaluate for residual splenic pseudoaneurysm    FINDINGS:    Abdomen and pelvis: The major abdominal vasculature is patent, without  evidence of infrarenal abdominal aortic aneurysm. The celiac artery,  the SMA, the JOAQUIN, and the bilateral renal arteries are patent. Mild  atherosclerotic calcifications of the origin of the celiac artery and  the right renal artery.    Redemonstration of ruptured spleen with adjacent left retroperitoneal  collection, status post placement of pigtail catheter within the  collection. The  collection is not substantially changed in size from  prior examination, with continued foci of air within the collection.  Embolization coils are seen within the splenic artery, otherwise the  splenic artery is unremarkable in appearance. No evidence of splenic  artery pseudoaneurysm.    Diffuse hepatic steatosis with focal fatty sparing in the gallbladder  fossa. The liver is otherwise unremarkable in appearance. Gallbladder  is distended and otherwise unremarkable. No intra or extrahepatic  biliary dilatation. Moderate fatty replacement of the pancreatic  parenchyma. The adrenal glands are within normal limits. Cortical  defects in both kidneys, likely related to prior insult from infection  or infarction. No nephrolithiasis or hydronephrosis.    No abnormally dilated loop of small bowel or large bowel. Colonic  diverticulosis, without CT findings to suggest diverticulitis. No  pneumatosis or portal venous gas. There is moderate free fluid in the  pelvis, unchanged. Small fat-containing left inguinal hernia with  dependent hyperdense fluid, not substantially changed.    Lung bases: Unchanged consolidation within the posterior medial right  lower lobe, with loculated pleural fluid in the left lung base, not  substantially changed from prior CT. Unchanged interlobular septal  thickening. Calcified pleural plaques, likely related to prior  asbestos exposure.    Bones and soft tissues: No acute or suspicious osseous abnormality.  Multilevel degenerative changes of the thoracolumbar spine.      Impression    IMPRESSION:   1. Redemonstration of splenic rupture status post intravascular  coiling of the splenic artery. No evidence of splenic artery  pseudoaneurysm or active extravasation.  2. Postprocedural changes of left retroperitoneal drain placement. No  significant interval change in size of the left upper quadrant  retroperitoneal collection, with continued foci of air within the  collection. Findings remain  compatible with infected retroperitoneal  hematoma.  3. Unchanged loculated fluid tracking along the left major fissure and  intralobular septal thickening within the both lung bases, suggestive  of pulmonary edema. No significant change in right lower lobe  consolidation.    I have personally reviewed the examination and initial interpretation  and I agree with the findings.    IAN SOTO MD       Time Spent on this Encounter   I, Janie Richardson, personally saw the patient today and spent greater than 30 minutes discharging this patient.    We appreciate the opportunity to care for your patient while in the hospital.  Should you have any questions about their injuries or this discharge summary our contact information is below.    Trauma Services  [facility]   Department of Critical Care and Acute Care Surgery  [address] [address]  Office: [phone]

## 2019-05-29 ENCOUNTER — TELEPHONE (OUTPATIENT)
Dept: INFECTIOUS DISEASES | Facility: CLINIC | Age: 79
End: 2019-05-29

## 2019-05-29 DIAGNOSIS — D73.3 SPLENIC ABSCESS: Primary | ICD-10-CM

## 2019-05-29 PROBLEM — E11.9 TYPE 2 DIABETES MELLITUS WITHOUT COMPLICATION, WITHOUT LONG-TERM CURRENT USE OF INSULIN (H): Status: ACTIVE | Noted: 2018-05-15

## 2019-05-29 PROBLEM — I83.893 VARICOSE VEINS OF BOTH LOWER EXTREMITIES WITH COMPLICATIONS: Status: ACTIVE | Noted: 2019-04-23

## 2019-05-29 NOTE — PROGRESS NOTES
[facility]: Post-Discharge Note  SITUATION                                                      Admission:    Admission Date: 05/24/19   Reason for Admission: Grade III splenic laceration  Discharge:   Discharge Date: 05/28/19  Discharge Diagnosis: Grade III splenic laceration  Discharge Service: Trauma     BACKGROUND                                                      Mr. Don is a 78-year-old male with a history of a grade IV (radiology read as III) splenic laceration after ground level fall on 5/10/2019, received 1 unit pRBCs and underwent IR embolization with gel foam and coiling. He required one additional unit of blood for low hemoglobin while inpatient and was discharged on 5/16. He was re-admitted on 5/24/2019 with abdominal pain and an elevated white count concerning for infection in the embolization bed.  He is status post drain placement on 5/24/2019. Infectious disease was consulted and he was started on zosyn. He was initially feeling fatigued and weak, but feels much improved today. He was able to tolerate a PO diet without nausea or abdominal pain. He is able to ambulate well. Teaching for home management of drain with flushes was done today and pt and wife verbalizes understanding.    ASSESSMENT      Discharge Assessment  Patient reports symptoms are: Improved  Does the patient have all of their medications?: Yes  Does patient know what their new medications are for?: Yes  Does patient have a follow-up appointment scheduled?: Yes  Does patient have any other questions or concerns?: No    Post-op  Did the patient have surgery or a procedure: Yes  Drainage: Yes  Bleeding: none  Fever: No  Chills: No  Redness: No  Warmth: No  Swelling: No  Incision site pain: No  Eating & Drinking: eating and drinking without complaints/concerns  PO Intake: other(Moderate Consistent CHO Diet)  Bowel Function: normal  Urinary Status: voiding without complaint/concerns    PLAN                                                       Outpatient Plan:      Follow up with primary care provider, Violet Guzman, within 7 days for hospital follow- up.     Please schedule follow-up in ID clinic in ~2 weeks, any available provider  - Please repeat CRP and abdominal CT (if not already completed by IR) prior to ID appointment    Follow-up with Interventional radiology in 1-2 week    Future Appointments   Date Time Provider Department Center   6/4/2019 10:00 AM [facility]   6/4/2019 10:30 AM Ivan Latif MD [facility]           Liz Yusuf, CMA

## 2019-05-30 LAB
BACTERIA SPEC CULT: NO GROWTH
Lab: NORMAL
SPECIMEN SOURCE: NORMAL

## 2019-06-01 LAB
BACTERIA SPEC CULT: ABNORMAL
BACTERIA SPEC CULT: ABNORMAL
Lab: ABNORMAL
SPECIMEN SOURCE: ABNORMAL

## 2019-06-03 ENCOUNTER — TELEPHONE (OUTPATIENT)
Dept: SURGERY | Facility: CLINIC | Age: 79
End: 2019-06-03

## 2019-06-03 NOTE — TELEPHONE ENCOUNTER
Pre Visit Call and Assessment    Date of call:  06/03/2019    Phone numbers:  Home number on file [phone] (home)    Reached patient/confirmed appointment:  Yes  Patient care team/Primary provider:  Violet Guzman  Preferred outpatient pharmacy:    [facility] - [place], [place] - [address]  [address]  [facility] [address]  Phone: [phone] Fax: [phone]    [facility]  [address] [place] [address]  Phone: [phone] Fax: [phone]    Referred to:  Dr. Ivan Latif    Reason for visit:  Trauma follow up-Splenic laceration    Problem List:    Patient Active Problem List   Diagnosis     Splenic laceration     Spleen injury     Splenic abscess     Ventral hernia     Varicose veins of both lower extremities with complications     Type 2 diabetes mellitus without complication, without long-term current use of insulin (H)     Stenosis of left carotid artery     Psoriasis     Adenomatous polyp of colon       Allergies:     Allergies   Allergen Reactions     Crestor [Rosuvastatin] Other (See Comments)     Joint Pain     No Known Drug Allergies        History:      Past Medical History:   Diagnosis Date     Allergic state      Benign prostatic hyperplasia      Diabetes mellitus type 2, controlled, without complications (H)     controlled with diet and exercise      HTN (hypertension)     controlled with medication     Macular degeneration of right eye     receives injections q8w     Obstructive chronic bronchitis with exacerbation (H)        Past Surgical History:   Procedure Laterality Date     ENT SURGERY  1989    Surgery on nose      GI SURGERY  05/06/2019    Ablation on spleen      IR VISCERAL ANGIOGRAM  5/10/2019     ORTHOPEDIC SURGERY       OTHER SURGICAL HISTORY      Sinus/nose surgery many years ago     OTHER SURGICAL HISTORY      Trigger thumb       Family History   Problem Relation Age of Onset      LLUVIA Mother      Breast Cancer Mother         uterine      Genetic Disorder Mother         machular degeneration     Neurologic Disorder Father         parkinson     Eye Disorder Brother         photophobia       Social History     Tobacco Use     Smoking status: Former Smoker     Smokeless tobacco: Never Used   Substance Use Topics     Alcohol use: Yes     Comment: moderate

## 2019-06-04 ENCOUNTER — OFFICE VISIT (OUTPATIENT)
Dept: SURGERY | Facility: CLINIC | Age: 79
End: 2019-06-04
Payer: COMMERCIAL

## 2019-06-04 ENCOUNTER — OFFICE VISIT (OUTPATIENT)
Dept: PLASTIC SURGERY | Facility: CLINIC | Age: 79
End: 2019-06-04
Payer: COMMERCIAL

## 2019-06-04 ENCOUNTER — HOSPITAL ENCOUNTER (EMERGENCY)
Facility: CLINIC | Age: 79
Discharge: LEFT BEFORE DISCHARGE | End: 2019-06-04
Attending: EMERGENCY MEDICINE | Admitting: EMERGENCY MEDICINE
Payer: COMMERCIAL

## 2019-06-04 ENCOUNTER — ANCILLARY PROCEDURE (OUTPATIENT)
Dept: GENERAL RADIOLOGY | Facility: CLINIC | Age: 79
End: 2019-06-04
Attending: PLASTIC SURGERY
Payer: COMMERCIAL

## 2019-06-04 VITALS
TEMPERATURE: 96 F | WEIGHT: 153 LBS | OXYGEN SATURATION: 100 % | HEIGHT: 63 IN | DIASTOLIC BLOOD PRESSURE: 72 MMHG | RESPIRATION RATE: 16 BRPM | HEART RATE: 62 BPM | BODY MASS INDEX: 27.11 KG/M2 | SYSTOLIC BLOOD PRESSURE: 115 MMHG

## 2019-06-04 VITALS
WEIGHT: 152.9 LBS | OXYGEN SATURATION: 97 % | DIASTOLIC BLOOD PRESSURE: 73 MMHG | BODY MASS INDEX: 27.09 KG/M2 | SYSTOLIC BLOOD PRESSURE: 116 MMHG | HEART RATE: 95 BPM | HEIGHT: 63 IN

## 2019-06-04 DIAGNOSIS — S36.039D LACERATION OF SPLEEN, SUBSEQUENT ENCOUNTER: ICD-10-CM

## 2019-06-04 DIAGNOSIS — M79.642 PAIN OF LEFT HAND: ICD-10-CM

## 2019-06-04 DIAGNOSIS — S36.039D LACERATION OF SPLEEN, SUBSEQUENT ENCOUNTER: Primary | ICD-10-CM

## 2019-06-04 DIAGNOSIS — D73.3 SPLENIC ABSCESS: ICD-10-CM

## 2019-06-04 DIAGNOSIS — M79.642 PAIN OF LEFT HAND: Primary | ICD-10-CM

## 2019-06-04 LAB
BASOPHILS # BLD AUTO: 0 10E9/L (ref 0–0.2)
BASOPHILS NFR BLD AUTO: 0.3 %
CRP SERPL-MCNC: 166 MG/L (ref 0–8)
DIFFERENTIAL METHOD BLD: ABNORMAL
EOSINOPHIL # BLD AUTO: 0 10E9/L (ref 0–0.7)
EOSINOPHIL NFR BLD AUTO: 0.3 %
ERYTHROCYTE [DISTWIDTH] IN BLOOD BY AUTOMATED COUNT: 14.1 % (ref 10–15)
ERYTHROCYTE [SEDIMENTATION RATE] IN BLOOD BY WESTERGREN METHOD: 104 MM/H (ref 0–20)
HCT VFR BLD AUTO: 38.8 % (ref 40–53)
HGB BLD-MCNC: 12.2 G/DL (ref 13.3–17.7)
IMM GRANULOCYTES # BLD: 0.1 10E9/L (ref 0–0.4)
IMM GRANULOCYTES NFR BLD: 0.6 %
LYMPHOCYTES # BLD AUTO: 0.7 10E9/L (ref 0.8–5.3)
LYMPHOCYTES NFR BLD AUTO: 7 %
MCH RBC QN AUTO: 29 PG (ref 26.5–33)
MCHC RBC AUTO-ENTMCNC: 31.4 G/DL (ref 31.5–36.5)
MCV RBC AUTO: 92 FL (ref 78–100)
MONOCYTES # BLD AUTO: 0.7 10E9/L (ref 0–1.3)
MONOCYTES NFR BLD AUTO: 7 %
NEUTROPHILS # BLD AUTO: 8.9 10E9/L (ref 1.6–8.3)
NEUTROPHILS NFR BLD AUTO: 84.8 %
NRBC # BLD AUTO: 0 10*3/UL
NRBC BLD AUTO-RTO: 0 /100
PLATELET # BLD AUTO: 204 10E9/L (ref 150–450)
RBC # BLD AUTO: 4.21 10E12/L (ref 4.4–5.9)
WBC # BLD AUTO: 10.5 10E9/L (ref 4–11)

## 2019-06-04 PROCEDURE — 99283 EMERGENCY DEPT VISIT LOW MDM: CPT | Mod: 25 | Performed by: EMERGENCY MEDICINE

## 2019-06-04 PROCEDURE — 99284 EMERGENCY DEPT VISIT MOD MDM: CPT | Mod: Z6 | Performed by: EMERGENCY MEDICINE

## 2019-06-04 PROCEDURE — 20600 DRAIN/INJ JOINT/BURSA W/O US: CPT | Performed by: EMERGENCY MEDICINE

## 2019-06-04 RX ORDER — NAPROXEN 500 MG/1
500 TABLET ORAL 2 TIMES DAILY WITH MEALS
Qty: 14 TABLET | Refills: 0 | Status: SHIPPED | OUTPATIENT
Start: 2019-06-04 | End: 2019-06-11

## 2019-06-04 ASSESSMENT — ENCOUNTER SYMPTOMS
NIGHT SWEATS: 0
CHILLS: 1
HEARTBURN: 0
NAUSEA: 1
MUSCLE WEAKNESS: 0
POOR WOUND HEALING: 0
SKIN CHANGES: 0
DECREASED APPETITE: 0
CHILLS: 1
NIGHT SWEATS: 0
NAIL CHANGES: 0
WEIGHT GAIN: 0
MUSCLE CRAMPS: 0
INCREASED ENERGY: 1
MYALGIAS: 0
NAUSEA: 1
SHORTNESS OF BREATH: 0
BOWEL INCONTINENCE: 0
STIFFNESS: 0
CONSTIPATION: 1
JOINT SWELLING: 1
MUSCLE WEAKNESS: 0
SKIN CHANGES: 0
ABDOMINAL PAIN: 0
JAUNDICE: 0
POLYDIPSIA: 0
CONFUSION: 0
PANIC: 0
NAIL CHANGES: 0
NECK STIFFNESS: 0
ARTHRALGIAS: 0
JAUNDICE: 0
NERVOUS/ANXIOUS: 1
DECREASED CONCENTRATION: 0
HEARTBURN: 0
BLOOD IN STOOL: 0
INSOMNIA: 0
FEVER: 0
INCREASED ENERGY: 1
WEIGHT LOSS: 0
ARTHRALGIAS: 1
FATIGUE: 1
RECTAL PAIN: 0
WEIGHT GAIN: 0
POLYPHAGIA: 0
ALTERED TEMPERATURE REGULATION: 1
BACK PAIN: 0
ARTHRALGIAS: 1
JOINT SWELLING: 1
POLYDIPSIA: 0
ALTERED TEMPERATURE REGULATION: 1
HEADACHES: 0
BLOATING: 1
CONSTIPATION: 1
MYALGIAS: 0
NECK PAIN: 0
DECREASED APPETITE: 0
VOMITING: 0
EYE REDNESS: 0
FEVER: 0
WEIGHT LOSS: 0
HALLUCINATIONS: 0
VOMITING: 0
HALLUCINATIONS: 0
DIFFICULTY URINATING: 0
FEVER: 0
DEPRESSION: 1
MUSCLE CRAMPS: 0
POOR WOUND HEALING: 0
ABDOMINAL PAIN: 0
NERVOUS/ANXIOUS: 1
ABDOMINAL PAIN: 0
POLYPHAGIA: 0
PANIC: 0
DIARRHEA: 0
DECREASED CONCENTRATION: 0
DEPRESSION: 1
RECTAL PAIN: 0
BOWEL INCONTINENCE: 0
BACK PAIN: 0
INSOMNIA: 0
FATIGUE: 1
COLOR CHANGE: 0
NECK PAIN: 0
DIARRHEA: 0
BLOOD IN STOOL: 0
BLOATING: 1
STIFFNESS: 0

## 2019-06-04 ASSESSMENT — PATIENT HEALTH QUESTIONNAIRE - PHQ9
SUM OF ALL RESPONSES TO PHQ QUESTIONS 1-9: 17
SUM OF ALL RESPONSES TO PHQ QUESTIONS 1-9: 17

## 2019-06-04 ASSESSMENT — MIFFLIN-ST. JEOR
SCORE: 1318.68
SCORE: 1319.13

## 2019-06-04 ASSESSMENT — PAIN SCALES - GENERAL: PAINLEVEL: SEVERE PAIN (6)

## 2019-06-04 NOTE — PATIENT INSTRUCTIONS
You met with Dr. Ivan Latif.      Today's visit instructions:    To be followed by plastics, possible surgery today.   Follow up with Dr. Latif in 1 month.        If you have questions please contact Anselmo RN or Jamilah RN during regular clinic hours, Monday through Friday 7:30 AM - 4:00 PM, or you can contact us via [employer] at anytime.       If you have urgent needs after-hours, weekends, or holidays please call the hospital at [phone] and ask to speak with our on-call General Surgery Team.    Appointment [phone], option #1   Nurse Advice (Anselmo or Jamilah): [phone]   Surgery Scheduler (Татьяна): [phone]  Fax: [phone]

## 2019-06-04 NOTE — NURSING NOTE
"Chief Complaint   Patient presents with     RECHECK     pt here for recheck after traumatic splenic laceration       Vitals:    06/04/19 1030   BP: 116/73   BP Location: Left arm   Patient Position: Chair   Cuff Size: Adult Regular   Pulse: 95   SpO2: 97%   Weight: 69.4 kg (152 lb 14.4 oz)   Height: 1.6 m (5' 3\")       Body mass index is 27.09 kg/m .    Hans Landeros NREMT     Vitals not taken per provider order.    Unable to obtain history as provider wanted to see patient.    "

## 2019-06-04 NOTE — ED NOTES
"Patient and spouse upset that they are still waiting for the hand specialist team to consult on patient. RN apologized for the long wait time and told patient that the team was coming from [facility] so it may be a delay. Patient and spouse continue to be upset with wait time, stating \"It boils down to this, I need to get out of here now\". MD aware of patient's concern for waiting, will f/u.  "

## 2019-06-04 NOTE — DISCHARGE INSTRUCTIONS
Follow-up with your primary care provider tomorrow.    Return if you develop fevers, chills, nausea, vomiting or worsening symptoms.    This may be gout or infection.  Please take naproxen and your antibiotics that I have prescribed you.

## 2019-06-04 NOTE — PROGRESS NOTES
General Surgery Staff:     The patient is well known to the General Surgery and Trauma service. The patient is a 78 year old man with a recent grade III splenic laceration who underwent IR embolization with gel foam and coiling. The patient developed necrosis of the spleen and an infection in the spleen and a percutaneous drain was placed on 5/24/2019. The patient has now developed pain, edema erythema and tenderness in the left third MP joint and was evaluated in the clinic by Dr. Allen from the Hand Surgery Service. The patient was sent to the ED for aspiration, cultures, microscopic evaluation and further treatment of the hand.     GEN: the patient appears chronically ill he appears to have lost muscle mass.   ABD: Marked tenderness of the left upper quadrant and the left flank in the area of the left flank IR percutaneous drain.   EXT: The left hand and long finger are edematous with decreased movement of the edematous left long finger. The left third MP joint is swollen, red and tender.     A/P: The drain has been working intermittently and I do not believe that a surgical intervention at this time would be the best option for the patient at this time. The left hand will need further evaluation and treatment by the hand surgical service. I would like to see th patient back in the Surgery clinic to confirm improvement of the patient splenic abscess. The patient may require a cardiac ECHO if there appear to be septic emboli.    Ivan Latif MD, PhD        Patient was accessed by Dr. Wesley Allen. Was sent for imaging and then to ER for emergent surgery. Dr. Allen to follow.

## 2019-06-04 NOTE — CONSULTS
[facility]  ORTHOPAEDIC SURGERY HISTORY AND PHYSICAL    CC: L hand pain    DATE OF INJURY: N/A    HISTORY OF PRESENT ILLNESS:   The orthopaedic surgery service was consulted by Dr. Kay pemberton. providers found for evaluation and treatment recommendations of L hand swelling.    Jayden Don is a 78 year old R-hand dominant male with pmhx notable for recent splenic laceration s/p embolization on 5/24/19 who presents with 3 days of left hand pain. He noted that he first noted pain in his left hand near the base of his middle finger on Saturday. It has persisted since then and has swelled and become red over the past day. Denies any known trauma to the area. No history of gout but does endorse remote history of similar pain and swelling noted in his R elbow that resolved with NSAIDs. Noted that they tried to aspirate his elbow at that time but were unable to get any fluid.     Denies numbness, tingling, or weakness to the affected extremities.  Denies fevers, chills, nausea, vomiting, diarrhea, constipation, chest pain, shortness of breath.    PAST MEDICAL HISTORY:   Past Medical History:   Diagnosis Date     Allergic state      Benign prostatic hyperplasia      Diabetes mellitus type 2, controlled, without complications (H)     controlled with diet and exercise      HTN (hypertension)     controlled with medication     Macular degeneration of right eye     receives injections q8w     Obstructive chronic bronchitis with exacerbation (H)        PAST SURGICAL HISTORY:    Past Surgical History:   Procedure Laterality Date     ENT SURGERY  1989    Surgery on nose      GI SURGERY  05/06/2019    Ablation on spleen      IR VISCERAL ANGIOGRAM  5/10/2019     ORTHOPEDIC SURGERY       OTHER SURGICAL HISTORY      Sinus/nose surgery many years ago     OTHER SURGICAL HISTORY      Trigger thumb       MEDICATIONS:   Prior to Admission medications    Medication Sig Last Dose Taking? Auth Provider   naproxen (NAPROSYN) 500 MG  tablet Take 1 tablet (500 mg) by mouth 2 times daily (with meals) for 7 days  Yes Tin Shelley MD   acetaminophen (TYLENOL) 325 MG tablet Take 3 tablets (975 mg) by mouth every 8 hours as needed for mild pain   Thais Goss APRN CNP   amoxicillin (AMOXIL) 875 MG tablet Take 1 tablet (875 mg) by mouth 2 times daily for 14 days   Janie Richardson APRN CNS   ANDROGEL 50 MG/5GM TD PACK 1 pack topically q day      GLUCOSAMINE-CHONDROITIN 750-600 MG OR TABS 1 tablet at bedtime      loratadine (CLARITIN) 10 MG tablet Take 1 tablet (10 mg) by mouth daily   Thais Goss APRN CNP   LOTRISONE 1-0.05 % EX CREA apply as directed      oxyCODONE (ROXICODONE) 5 MG tablet Take 0.5 tablets (2.5 mg) by mouth every 8 hours as needed for severe pain  Patient not taking: Reported on 6/4/2019   Janie Richardson APRN CNS   polyethylene glycol (MIRALAX/GLYCOLAX) packet Take 17 g by mouth daily   Thais Goss APRN CNP   sodium chloride 0.9 % SOLN Irrigate with 1,000 mLs as directed daily Irrigate left drain with 10ml three times a day   Thais Goss APRN CNP   tamsulosin (FLOMAX) 0.4 MG capsule Take 1 capsule (0.4 mg) by mouth At Bedtime   Thais Goss APRN CNP   TRIAMTERENE-HCTZ 37.5-25 MG OR CAPS 1 CAPSULE DAILY   Ro Hatfield MD   VIAGRA 100 MG OR TABS 1 TABLET BY MOUTH AS DIRECTED      VITAMIN E 800 IU OR CAPS 1 tablet daily          ALLERGIES:   Crestor [rosuvastatin] and No known drug allergies    SOCIAL HISTORY:   Social History     Socioeconomic History     Marital status:      Spouse name: Not on file     Number of children: Not on file     Years of education: Not on file     Highest education level: Not on file   Occupational History     Not on file   Social Needs     Financial resource strain: Not on file     Food insecurity:     Worry: Not on file     Inability: Not on file     Transportation needs:     Medical: Not on file     Non-medical: Not on file  "  Tobacco Use     Smoking status: Former Smoker     Smokeless tobacco: Never Used   Substance and Sexual Activity     Alcohol use: Yes     Comment: moderate     Drug use: Never     Sexual activity: Not on file   Lifestyle     Physical activity:     Days per week: Not on file     Minutes per session: Not on file     Stress: Not on file   Relationships     Social connections:     Talks on phone: Not on file     Gets together: Not on file     Attends Confucianist service: Not on file     Active member of club or organization: Not on file     Attends meetings of clubs or organizations: Not on file     Relationship status: Not on file     Intimate partner violence:     Fear of current or ex partner: Not on file     Emotionally abused: Not on file     Physically abused: Not on file     Forced sexual activity: Not on file   Other Topics Concern     Not on file   Social History Narrative     Not on file     FAMILY HISTORY:  Family History   Problem Relation Age of Onset     C.A.D. Mother      Breast Cancer Mother         uterine      Genetic Disorder Mother         machular degeneration     Neurologic Disorder Father         parkinson     Eye Disorder Brother         photophobia         REVIEW OF SYSTEMS:   Positive for hand pain. Otherwise a 10-point reviews of systems was negative except as noted above in the HPI.     PHYSICAL EXAM:   Vitals:    06/04/19 1406   BP: 115/72   Pulse: 62   Resp: 16   Temp: 96  F (35.6  C)   SpO2: 100%   Weight: 69.4 kg (153 lb)   Height: 1.6 m (5' 3\")     General: Awake, alert, appropriate, following commands, NAD.  Skin: No rashes,  skin color normal  L Hand: noted erythema and swelling noted around MCP of long finger. Painful with flexion. But no significant pain in the mid range range of motion. Tolerates 0-80 degrees. TTP on dorsal and volar surfaces of MCP joint. Flexes to 80 degrees with pain. Sensation to light touch intact in M/R/U distributions. Fires FDS and FDP. No tenderness on volar " surface. Palpable radial pulse, fingers wwp    LABS:  Hemoglobin   Date Value Ref Range Status   2019 12.2 (L) 13.3 - 17.7 g/dL Final   2019 10.4 (L) 13.3 - 17.7 g/dL Final     WBC   Date Value Ref Range Status   2019 10.5 4.0 - 11.0 10e9/L Final     Platelet Count   Date Value Ref Range Status   2019 204 150 - 450 10e9/L Final     INR   Date Value Ref Range Status   2019 1.13 0.86 - 1.14 Final     Creatinine   Date Value Ref Range Status   2019 0.85 0.66 - 1.25 mg/dL Final     Glucose   Date Value Ref Range Status   2019 142 (H) 70 - 99 mg/dL Final     Recent Labs   Lab Test 19  0946 19  1245 19  0651   HGB 12.2* 10.4* 9.3*   *  --   --    .0*  --   --    WBC 10.5 9.2 9.8       IMAGING:  3 views left hand radiographs 2019 2:42 PM    1. Erosions at third and possibly second proximal interphalangeal  joints  2. Polyarticular osteoarthrosis, moderate.    IMPRESSION:   Jayden Don is a 78 year old R-hand dominant male status-post splenic embolization on 19 with the followin. R hand pain and swelling since 19. Likely gout given erosions seen on xray, prior episode in elbow, and clinical picture. However, cannot rule out septic joint given lack of joint aspirate.     Procedure: After consent was obtained confirming the left long finger as a site of procedure, it was prepped using chlorhexidine.  Traction was placed in the joint and was placed into flexion.  A 27-gauge needle was used from a dorsal approach to attempt to aspirate the long finger MCP joint.  The joint was entered easily, but no fluid was able to be aspirated.  The patient tolerated the procedure well.    RECOMMENDATIONS:   - Recommend inpatient admission for IV abx and observation. However, patient refused admission given financial limitations. Discussed risks of joint arthritis and sepsis with no IV antibiotics. He has close follow-up with his PCP tomorrow.    -given likely gout, recommended treatment with NSAIDs. Augmentin given as well due to concern for infection.   -Recommend follow-up with hand surgeon near home in [place] within the next week.  - Disposition: discharge to home with close follow up with PCP tomorrow and possibly hand surgery later this week if no improvement.     Assessment and Plan discussed with Dr. Reid, Orthopaedic Surgery hand staff.     Justin Murrell MD 06/04/19  Orthopaedic Surgery Resident, PGY-4    For questions about this patient, please contact me at my pager.

## 2019-06-04 NOTE — ED NOTES
Pt. Signed AMA paperwork after ED MD Shelley explained risks of leaving. Paperwork placed in paper chart

## 2019-06-04 NOTE — LETTER
6/4/2019       RE: Jayden Don  [address] [facility] [address]     Dear Colleague,    Thank you for referring your patient, Jayden Don, to the [facility] PLASTIC AND RECONSTRUCTIVE SURGERY at [facility]. Please see a copy of my visit note below.    CC: L hand swelling and pain for past 3 days with recent history of splenic laceration. Pain is worsening. Denies history of gout. Denies fever and chills.    PHYSICAL EXAMINATION:  General: In no acute distress.  On examination of the left hand, there is erythematous swelling around the MP joint of the long finger. This is tender to palpation and tender with passive ranging, though the patient is actively range the finger quite a bit. Finger is perfused.    IMAGING: XR of hand ordered.    ASSESSMENT: L hand pain and swelling, concerning for possible septic arthritis vs gout.    PLAN: I recommended patient undergo attempt at joint aspiration to study the fluid. Unfortunately, I was called for an emergent operative situation. I asked the patient to go to the [place] ED after XR of hand. He understood the rationale for this.    Total time spent with patient was 15 min of which greater than 50% was in counseling.    Again, thank you for allowing me to participate in the care of your patient.      Sincerely,    Nicko Allen MD

## 2019-06-04 NOTE — ED TRIAGE NOTES
78-yr male patient - presenting to ED for eval of left hand redness, swelling; possible cellulitis; recent hospitalization for embolization of spleen.

## 2019-06-04 NOTE — ED NOTES
Patient unwilling to be admitted for IV abx. Patient and spouse wanting to go home, MD and RN expressed importance of being admitted to treat cellulitis. ED MD at bedside, AMA ready to sign.

## 2019-06-04 NOTE — LETTER
6/4/2019       RE: Jayden Don  [address] [facility] [address]     Dear Colleague,    Thank you for referring your patient, Jayden Don, to the [facility] GENERAL SURGERY at [facility]. Please see a copy of my visit note below.    General Surgery Staff:     The patient is well known to the General Surgery and Trauma service. The patient is a 78 year old man with a recent grade III splenic laceration who underwent IR embolization with gel foam and coiling. The patient developed necrosis of the spleen and an infection in the spleen and a percutaneous drain was placed on 5/24/2019. The patient has now developed pain, edema erythema and tenderness in the left third MP joint and was evaluated in the clinic by Dr. Allen from the Hand Surgery Service. The patient was sent to the ED for aspiration, cultures, microscopic evaluation and further treatment of the hand.     GEN: the patient appears chronically ill he appears to have lost muscle mass.   ABD: Marked tenderness of the left upper quadrant and the left flank in the area of the left flank IR percutaneous drain.   EXT: The left hand and long finger are edematous with decreased movement of the edematous left long finger. The left third MP joint is swollen, red and tender.     A/P: The drain has been working intermittently and I do not believe that a surgical intervention at this time would be the best option for the patient at this time. The left hand will need further evaluation and treatment by the hand surgical service. I would like to see th patient back in the Surgery clinic to confirm improvement of the patient splenic abscess. The patient may require a cardiac ECHO if there appear to be septic emboli.    Ivan Latif MD, PhD    Patient was accessed by Dr. Wesley Allen. Was sent for imaging and then to ER for emergent surgery. Dr. Allen to follow.

## 2019-06-06 ENCOUNTER — TELEPHONE (OUTPATIENT)
Dept: INFECTIOUS DISEASES | Facility: CLINIC | Age: 79
End: 2019-06-06

## 2019-06-06 ENCOUNTER — TELEPHONE (OUTPATIENT)
Dept: SURGERY | Facility: CLINIC | Age: 79
End: 2019-06-06

## 2019-06-06 NOTE — TELEPHONE ENCOUNTER
[facility] Call Center    Phone Message    May a detailed message be left on voicemail: yes    Reason for Call: Other: Michelle from [facility] just wanted to share with us two updates about our mutual Pt. He was seen yesterday at their clinic for a F/U. 1. Pt had been taking Amoxicillin, but on 6/4/19 he was switched to AUGMENTIN. He was suffering Cellulitis of the Left Hand. Michelle stated that it as been improving with the new medication. Pt has enough of medication to get through before he sees us. 2. Michelle wanted us updated on the previous way the Pt's wife had been doing his drain flushes. Pt was given 10 ml pre-filled syringes, and Pt's wife was given a Rx for refills. Stated that she couldn't get it filled anywhere. When they ran out of the pre-filled syringes Michelle stated that the Pt's wife bought OTC saline solution, and that she already had a few needles at home. She would pour the saline into a clean cup, and then drain/flush the syringes. She rotated using the syringes, and sanitized them with hot water. Pt as of last night has 20 pre-filled syringes. Michelle stated that it was emphasized to the Pt and his wife the importance of using the pre-filled syringes. If there are any questions please F/U with Michelle. Her direct number is: [phone] If no one answers it will roll over, and OK to leave detailed VM on the roll over number.     Action Taken: Message routed to:  [facility] ([facility] Infectious Disease

## 2019-06-06 NOTE — TELEPHONE ENCOUNTER
----- Message from Jamilah Kumar RN sent at 6/4/2019  1:53 PM CDT -----  Regarding: Visit  Clinic Scheduling Request    Provider:  Dr. Ivan Latif  Preferred Date/Time: 1 month  Visit length:  usual  Diagnosis:  Follow up Trauma- splenic laceration.   Schedule imaging:  No  Is order in and released: Yes- lab visit before visit  Additional information:  Please call patient to arrange

## 2019-06-06 NOTE — TELEPHONE ENCOUNTER
Per task, this writer called the patient to schedule a clinic appointment for follow up with Dr. Ivan Latif, there was no answer. Left message with an appointment on 07/19/2019 at 1:00 PM as well as this writer's direct line [phone].

## 2019-06-07 ENCOUNTER — TELEPHONE (OUTPATIENT)
Dept: SURGERY | Facility: CLINIC | Age: 79
End: 2019-06-07

## 2019-06-07 ENCOUNTER — TELEPHONE (OUTPATIENT)
Dept: VASCULAR SURGERY | Facility: CLINIC | Age: 79
End: 2019-06-07

## 2019-06-07 NOTE — TELEPHONE ENCOUNTER
This writer received a call from the patient's spouse, Billie. They wanted clarification on the appointment on 07/19/2019. This writer explained it was a follow up to Jayden's spleen injury. She expressed understanding. She requested to reschedule it to another day as Fridays they are not available. This writer confirmed an appointment on 07/17/2019 at 10:30 am with the patient's spouse. She appreciated the reschedule.

## 2019-06-07 NOTE — TELEPHONE ENCOUNTER
I called and left a voicemail including my call back number.  I called to find out more about his drain outputs and discuss next steps.  CT and sinogram when outputs less than 10cc x 2 days.  Pt to flush with NS BID 10cc.  SHAHIDA Lunsford, RN, BSN  Interventional Radiology Nurse Coordinator   Phone:  [phone]

## 2019-06-12 ENCOUNTER — OFFICE VISIT (OUTPATIENT)
Dept: INFECTIOUS DISEASES | Facility: CLINIC | Age: 79
End: 2019-06-12
Attending: INTERNAL MEDICINE
Payer: COMMERCIAL

## 2019-06-12 ENCOUNTER — ANCILLARY PROCEDURE (OUTPATIENT)
Dept: CT IMAGING | Facility: CLINIC | Age: 79
End: 2019-06-12
Attending: INTERNAL MEDICINE
Payer: COMMERCIAL

## 2019-06-12 VITALS
HEIGHT: 63 IN | WEIGHT: 152.7 LBS | HEART RATE: 92 BPM | SYSTOLIC BLOOD PRESSURE: 136 MMHG | BODY MASS INDEX: 27.05 KG/M2 | DIASTOLIC BLOOD PRESSURE: 75 MMHG | TEMPERATURE: 97.9 F

## 2019-06-12 DIAGNOSIS — S61.432D PUNCTURE WOUND OF LEFT HAND WITH INFECTION, SUBSEQUENT ENCOUNTER: ICD-10-CM

## 2019-06-12 DIAGNOSIS — L08.9 PUNCTURE WOUND OF LEFT HAND WITH INFECTION, SUBSEQUENT ENCOUNTER: ICD-10-CM

## 2019-06-12 DIAGNOSIS — D73.3 SPLENIC ABSCESS: ICD-10-CM

## 2019-06-12 DIAGNOSIS — D73.3 SPLENIC ABSCESS: Primary | ICD-10-CM

## 2019-06-12 DIAGNOSIS — A49.8 PROPIONIBACTERIUM INFECTION: ICD-10-CM

## 2019-06-12 DIAGNOSIS — E11.9 TYPE 2 DIABETES MELLITUS WITHOUT COMPLICATION, WITHOUT LONG-TERM CURRENT USE OF INSULIN (H): ICD-10-CM

## 2019-06-12 LAB
BASOPHILS # BLD AUTO: 0 10E9/L (ref 0–0.2)
BASOPHILS NFR BLD AUTO: 0.4 %
CRP SERPL-MCNC: 57.8 MG/L (ref 0–8)
DIFFERENTIAL METHOD BLD: ABNORMAL
EOSINOPHIL # BLD AUTO: 0.1 10E9/L (ref 0–0.7)
EOSINOPHIL NFR BLD AUTO: 1 %
ERYTHROCYTE [DISTWIDTH] IN BLOOD BY AUTOMATED COUNT: 14.4 % (ref 10–15)
ERYTHROCYTE [SEDIMENTATION RATE] IN BLOOD BY WESTERGREN METHOD: 67 MM/H (ref 0–20)
HCT VFR BLD AUTO: 39.9 % (ref 40–53)
HGB BLD-MCNC: 12.6 G/DL (ref 13.3–17.7)
IMM GRANULOCYTES # BLD: 0 10E9/L (ref 0–0.4)
IMM GRANULOCYTES NFR BLD: 0.4 %
LYMPHOCYTES # BLD AUTO: 1.4 10E9/L (ref 0.8–5.3)
LYMPHOCYTES NFR BLD AUTO: 14.6 %
MCH RBC QN AUTO: 28.5 PG (ref 26.5–33)
MCHC RBC AUTO-ENTMCNC: 31.6 G/DL (ref 31.5–36.5)
MCV RBC AUTO: 90 FL (ref 78–100)
MONOCYTES # BLD AUTO: 0.6 10E9/L (ref 0–1.3)
MONOCYTES NFR BLD AUTO: 6.2 %
NEUTROPHILS # BLD AUTO: 7.7 10E9/L (ref 1.6–8.3)
NEUTROPHILS NFR BLD AUTO: 77.4 %
NRBC # BLD AUTO: 0 10*3/UL
NRBC BLD AUTO-RTO: 0 /100
PLATELET # BLD AUTO: 202 10E9/L (ref 150–450)
RBC # BLD AUTO: 4.42 10E12/L (ref 4.4–5.9)
WBC # BLD AUTO: 9.9 10E9/L (ref 4–11)

## 2019-06-12 PROCEDURE — G0463 HOSPITAL OUTPT CLINIC VISIT: HCPCS | Mod: ZF

## 2019-06-12 PROCEDURE — 86140 C-REACTIVE PROTEIN: CPT | Performed by: INTERNAL MEDICINE

## 2019-06-12 PROCEDURE — 36415 COLL VENOUS BLD VENIPUNCTURE: CPT | Performed by: INTERNAL MEDICINE

## 2019-06-12 PROCEDURE — 85652 RBC SED RATE AUTOMATED: CPT | Performed by: INTERNAL MEDICINE

## 2019-06-12 PROCEDURE — 85025 COMPLETE CBC W/AUTO DIFF WBC: CPT | Performed by: INTERNAL MEDICINE

## 2019-06-12 RX ORDER — OMEGA-3 FATTY ACIDS/FISH OIL 300-1000MG
CAPSULE ORAL
COMMUNITY
Start: 2011-01-14

## 2019-06-12 RX ORDER — FLUTICASONE PROPIONATE 50 MCG
SPRAY, SUSPENSION (ML) NASAL
Refills: 11 | COMMUNITY
Start: 2018-12-10 | End: 2019-09-05

## 2019-06-12 RX ORDER — LOVASTATIN 20 MG
TABLET ORAL
Refills: 3 | COMMUNITY
Start: 2019-02-22

## 2019-06-12 RX ORDER — AMLODIPINE BESYLATE 5 MG/1
TABLET ORAL
Refills: 2 | COMMUNITY
Start: 2019-04-26

## 2019-06-12 RX ORDER — IOPAMIDOL 755 MG/ML
93 INJECTION, SOLUTION INTRAVASCULAR ONCE
Status: COMPLETED | OUTPATIENT
Start: 2019-06-12 | End: 2019-06-12

## 2019-06-12 RX ORDER — CALCIPOTRIENE 50 UG/G
OINTMENT TOPICAL
COMMUNITY
Start: 2016-07-20

## 2019-06-12 RX ADMIN — IOPAMIDOL 93 ML: 755 INJECTION, SOLUTION INTRAVASCULAR at 11:15

## 2019-06-12 ASSESSMENT — PAIN SCALES - GENERAL: PAINLEVEL: NO PAIN (0)

## 2019-06-12 ASSESSMENT — MIFFLIN-ST. JEOR: SCORE: 1317.77

## 2019-06-12 NOTE — DISCHARGE INSTRUCTIONS

## 2019-06-12 NOTE — NURSING NOTE
"/75   Pulse 92   Temp 97.9  F (36.6  C) (Oral)   Ht 1.6 m (5' 3\")   Wt 69.3 kg (152 lb 11.2 oz)   BMI 27.05 kg/m    Chief Complaint   Patient presents with     Consult     follow up with hospital visit, james germain       "

## 2019-06-13 ENCOUNTER — TELEPHONE (OUTPATIENT)
Dept: INFECTIOUS DISEASES | Facility: CLINIC | Age: 79
End: 2019-06-13

## 2019-06-13 NOTE — TELEPHONE ENCOUNTER
[facility] Call Center    Phone Message    May a detailed message be left on voicemail: yes    Reason for Call: Other: Patient stated that Dr. Santillan had told him at his appt. yesterday that someone would be reaching out to him today (6/13/19) by noon to offer him times to schedule his procedure either today or tomorrow. Pt didn't remember what the procedure was called, but stated that Dr. Santillan had needed the patient's previous Dr. Latif to OK it. Please call patient back at: [phone]  OK to leave detailed VM     Action Taken: Message routed to:  [facility] Infectious Disease

## 2019-06-13 NOTE — TELEPHONE ENCOUNTER
[facility] Call Center    Phone Message    May a detailed message be left on voicemail: yes    Reason for Call: Other: Pt called and is upset for still not getting a call back. Please call back pt asap. Thanks.     Action Taken: Message routed to:  [facility]): ID

## 2019-06-13 NOTE — TELEPHONE ENCOUNTER
Left VCM for patient to call back writer's direct clinic number to discuss questions.    Olga Beth RN

## 2019-06-13 NOTE — TELEPHONE ENCOUNTER
Writer talked to patient who is now scheduled for IR follow up visit at 6:30am tomorrow.    Olga Beth RN

## 2019-06-13 NOTE — TELEPHONE ENCOUNTER
[facility] Call Center    Phone Message    May a detailed message be left on voicemail: yes    Reason for Call: Other: Pt needing to know in the meantime while he is waiting on a call back from Dr. Santillan if he should continue to use the saline solution     Action Taken: Message routed to:  [facility] ID clinic

## 2019-06-14 ENCOUNTER — HOSPITAL ENCOUNTER (OUTPATIENT)
Facility: CLINIC | Age: 79
Discharge: HOME | End: 2019-06-14
Attending: INTERNAL MEDICINE | Admitting: INTERNAL MEDICINE
Payer: COMMERCIAL

## 2019-06-14 ENCOUNTER — APPOINTMENT (OUTPATIENT)
Dept: INTERVENTIONAL RADIOLOGY/VASCULAR | Facility: CLINIC | Age: 79
End: 2019-06-14
Attending: INTERNAL MEDICINE
Payer: COMMERCIAL

## 2019-06-14 ENCOUNTER — APPOINTMENT (OUTPATIENT)
Dept: MEDSURG UNIT | Facility: CLINIC | Age: 79
End: 2019-06-14
Attending: INTERNAL MEDICINE
Payer: COMMERCIAL

## 2019-06-14 VITALS
BODY MASS INDEX: 26.93 KG/M2 | TEMPERATURE: 98.2 F | OXYGEN SATURATION: 97 % | DIASTOLIC BLOOD PRESSURE: 73 MMHG | SYSTOLIC BLOOD PRESSURE: 126 MMHG | WEIGHT: 152 LBS | RESPIRATION RATE: 20 BRPM | HEART RATE: 99 BPM | HEIGHT: 63 IN

## 2019-06-14 DIAGNOSIS — D73.3 SPLENIC ABSCESS: ICD-10-CM

## 2019-06-14 LAB — GLUCOSE BLDC GLUCOMTR-MCNC: 186 MG/DL (ref 70–99)

## 2019-06-14 PROCEDURE — 40000166 ZZH STATISTIC PP CARE STAGE 1

## 2019-06-14 PROCEDURE — 82962 GLUCOSE BLOOD TEST: CPT

## 2019-06-14 PROCEDURE — 20501 NJX SINUS TRACT DIAGNOSTIC: CPT

## 2019-06-14 RX ORDER — FLUMAZENIL 0.1 MG/ML
0.2 INJECTION, SOLUTION INTRAVENOUS
Status: DISCONTINUED | OUTPATIENT
Start: 2019-06-14 | End: 2019-06-14 | Stop reason: HOSPADM

## 2019-06-14 RX ORDER — FENTANYL CITRATE 50 UG/ML
25-50 INJECTION, SOLUTION INTRAMUSCULAR; INTRAVENOUS EVERY 5 MIN PRN
Status: DISCONTINUED | OUTPATIENT
Start: 2019-06-14 | End: 2019-06-14 | Stop reason: HOSPADM

## 2019-06-14 RX ORDER — NALOXONE HYDROCHLORIDE 0.4 MG/ML
.1-.4 INJECTION, SOLUTION INTRAMUSCULAR; INTRAVENOUS; SUBCUTANEOUS
Status: DISCONTINUED | OUTPATIENT
Start: 2019-06-14 | End: 2019-06-14 | Stop reason: HOSPADM

## 2019-06-14 ASSESSMENT — MIFFLIN-ST. JEOR: SCORE: 1314.6

## 2019-06-14 NOTE — IP AVS SNAPSHOT
[address] [address]                                    After Visit Summary   6/14/2019    Jayden Don    MRN: [id number]           After Visit Summary Signature Page    I have received my discharge instructions, and my questions have been answered. I have discussed any challenges I see with this plan with the nurse or doctor.    ..........................................................................................................................................  Patient/Patient Representative Signature      ..........................................................................................................................................  Patient Representative Print Name and Relationship to Patient    ..................................................               ................................................  Date                                   Time    ..........................................................................................................................................  Reviewed by Signature/Title    ...................................................              ..............................................  Date                                               Time          22EPIC Rev 08/18

## 2019-06-14 NOTE — DISCHARGE INSTRUCTIONS
"[facility]  Interventional Radiology   Drainage Tube Instructions      AFTER YOU GO HOME:    Drink plenty of fluids and resume your regular diet.    For 24 hours:     No alcoholic beverages    Do not make any important legal decisions    No heavy lifting greater than 10 lb until instructed by your physician     Call Your Physician if:    You develop nausea or vomiting.    Your develop hives or rash or unexplained itching    Additional Instructions: Please call for the following problems:    No fluid draining from the tube, check that the tube is not kinked or if stop cock is closed.    Flush with 10 cc's Normal Saline, empty bag and monitor output daily subtracting amount inserted.    Skin around tube is red, painful or has any drainage.    You have increased pain in your abdomen    Fever greater than 100.5 F and chills    You feel nauseated and  \"just not right\"      Change dressing every 48 hour or when it gets wet    Call for appointment when drainage is 5-10 ml per day for a few days    Interventional Radiology Department    Physician:  DR LOZA                               Date:  June 14, 2019  Telehone numbers: [phone]...Monday-Friday 8:00am-4:30pm                                  [phone]... After 4:30 Monday-Friday, Weekends and Holiday. Ask for the Interverntional Radiologist on call. Someone is on call 24 hours a day.                                  "

## 2019-06-14 NOTE — PROGRESS NOTES
Pt arrives to 2a, with spouse, sinogram with possible drain exchange. Pre procedure assessment completed. H&P is up to date. Consent needs to be signed.   Per DOUGLAS Garcia pt does not need antibiotics.

## 2019-06-14 NOTE — PROCEDURES
Interventional Radiology Brief Post Procedure Note    Procedure: Sinogram    Proceduralist: Luis Angel Grant MD    Assistant: IR Fellow Physician, Chava Hearn MD    Time Out: Prior to the start of the procedure and with procedural staff participation, I verbally confirmed the patient s identity using two indicators, relevant allergies, that the procedure was appropriate and matched the consent or emergent situation, and that the correct equipment/implants were available. Immediately prior to starting the procedure I conducted the Time Out with the procedural staff and re-confirmed the patient s name, procedure, and site/side. (The Joint Commission universal protocol was followed.)  Yes        Sedation: None. Local Anesthestic used    Findings: Sinogram reveals drain in good location.    Estimated Blood Loss: Minimal    Fluoroscopy Time:  minute(s)    SPECIMENS: None    Complications: 1. None     Condition: Stable    Plan: Repeat sinogram when outputs have decreased to less then 5-10 ml per day.    Comments: See dictated procedure note for full details.    Jorge L Hearn MD

## 2019-06-14 NOTE — PROGRESS NOTES
8925--Pt arrived post sinogram, pt is awake and alert, denies pain. Dressing dry and intact, pt's home dressing is still in place. Bloody drainage in tubing. Wife at bedside. Pt taking PO, both food and drink without problem. Voided; IV discontinued, catheter intact. Pt up walking, steady on his feet. Teaching done, reviewed tube irrigation, pt to continue irrigating 3 x per day. Reviewed dressing changes; added stat lock anchor strip with teaching to change weekly. Supplies to pt. Gave script for more irrigation syringes to wife. Discharge instructions reviewed, copy to pt. Stated understanding. Clarified to call for appointment when drainage is 5-10 ml for a few days.    1010--discharged to lobby with wife.

## 2019-06-14 NOTE — PROGRESS NOTES
Interventional Radiology Intra-procedural Nursing Note    Patient Name: Jayden Don  Medical Record Number: [id number]  Today's Date: June 14, 2019    Procedure: left abdominal drain sinogram    Attending MD in room during timeout: Dr Grant  Proceduralist: Dr Hearn     Procedure Start Time: 0843  Procedure Puncture time:   Procedure End Time: 0846    Sedation start time:  Sedation end time:  Sedation medications given: none give     Report given to: Kimberlyn BROWNLEE, 2A    D: Patient brought to IR room 4 at 0820. Verified Patient's ID and informed consent using two identifiers.  A: Patient was positioned supine. He was monitored per IR conscious sedation protocol. Patient tolerated the procedure without apparent incident.  P: Patient returned to  for post procedure cares.    Mitzi Turpin RN  [phone]

## 2019-06-14 NOTE — PROCEDURES
Interventional Radiology Pre-Procedure Sedation Assessment   Time of Assessment: 8:07 AM    Expected Level: Moderate Sedation    Indication: Sedation is required for the following type of Procedure: Drain    Sedation and procedural consent: Risks, benefits and alternatives were discussed with Patient    PO Intake: Appropriately NPO for procedure    ASA Class: Class 2 - MILD SYSTEMIC DISEASE, NO ACUTE PROBLEMS, NO FUNCTIONAL LIMITATIONS.    Mallampati: Grade 2:  Soft palate, base of uvula, tonsillar pillars, and portion of posterior pharyngeal wall visible    Lungs: Lungs Clear with good breath sounds bilaterally    Heart: Normal heart sounds and rate    History and physical reviewed and no updates needed. I have reviewed the lab findings, diagnostic data, medications, and the plan for sedation. I have determined this patient to be an appropriate candidate for the planned sedation and procedure and have reassessed the patient IMMEDIATELY PRIOR to sedation and procedure.    Riley Rubio MD

## 2019-06-18 NOTE — PROGRESS NOTES
CC: L hand swelling and pain for past 3 days with recent history of splenic laceration. Pain is worsening. Denies history of gout. Denies fever and chills.    PHYSICAL EXAMINATION:  General: In no acute distress.  On examination of the left hand, there is erythematous swelling around the MP joint of the long finger. This is tender to palpation and tender with passive ranging, though the patient is actively range the finger quite a bit. Finger is perfused.    IMAGING: XR of hand ordered.    ASSESSMENT: L hand pain and swelling, concerning for possible septic arthritis vs gout.    PLAN: I recommended patient undergo attempt at joint aspiration to study the fluid. Unfortunately, I was called for an emergent operative situation. I asked the patient to go to the [facility] after XR of hand. He understood the rationale for this.    Total time spent with patient was 15 min of which greater than 50% was in counseling.

## 2019-06-30 NOTE — PROGRESS NOTES
"Division of Infectious Diseases and International Medicine  Department of Medicine        INFECTIOUS DISEASE CLINIC OUTPATIENT VISIT NOTE [place] [address]  [address] [place] [address]  Office: [phone]  Fax:  [phone]     HISTORY OF PRESENT ILLNESS:  Mr. Jayden Don is a pleasant 78-year-old gentleman with a history of type 2 diabetes mellitus, polyarticular osteoarthritis, and pulmonary fibrosis who was hospitalized at [facility] from 5/10 - 16/19 with a splenic laceration due to trauma from a fall.  He is accompanied to clinic today by his wife.  Embolization with gel foam and coiling was performed by Interventional Radiology on 5/10/19.  He was subsequently re-hospitalized at [facility] from 5/24 -28/19 with abdominal pain, high fever, and leukocytosis deemed due to a large subcapsular splenic hematoma into which a drain was placed by Interventional Radiology on 5/24/18.  The drainage was non-purulent but the Gram stain showed many PMNs with diphtheroids and the culture grew heavy growth of Propionibacterium acnes.  He was seen by inpatient Infectious Disease Consult and placed initially on Zosyn.  He did well and was transitioned to oral Augmentin 875 mg PO BID on 5/28/19, with a plan to continue that for two weeks through 6/11/19.  He was discharged to home on 5/28/19 with a twelve Fr drain present and instructions to flush it daily with ten milliliters of sterile saline.    He returns for follow-up in Infectious Disease Clinic today.  He completed the final Augmentin dose last night and took the antibiotic very consistently.  He has tolerated the Augmentin well without notable side effects including no nausea, new abdominal discomfort, significant diarrhea, rash or other noticed problem.  In general, he says he overall is \"feeling better every day\" with slow improvement in his general strength and stamina, with improved appetite, and with no overt fevers or any night sweats since his " "5/28/19 discharge.  He does still have intermittent chills later in the daytimes, however, although those seems to be decreasing in duration and frequency and resolve readily when he puts on a warm blanket.  He has essentially no abdominal pain except traction discomfort in the left upper abdominal area of the drain insertion site.  His wife has continued to flush the drain daily, but since 6/9/19 there has been some resistance to flushing and when the drain is flushed, purulent fluid leaks out from the insertion hole around the drain site.  The drain has drained no more than ten milliliters over 24 hours during the past two days.  They had some difficulty obtaining prefilled sterile saline flush syringes from his local pharmacy (or from a nearby hospital or from [place] mail order) after discharge, so have instead been flushing the drain with bulk saline poured into a container and drawn up into the same syringe.  A repeat abdominal CT scan was performed this morning which shows an extant large perisplenic fluid collection which is \"minimally increased\" compared to the pre-discharge 5/27/19 abdominal CT.  The drain does appear to be well-positioned within the splenic fluid collection.  A repeat serum CRP was obtained today and is improved, down to 57.8, compared to 166.0 on 6/4/19, although no CRP is available from during his preceding hospitalizations.  Similarly, his ESR is decreased today to 67 versus 104 on 6/4/19.  His prior leukocytosis has been resolved since 5/26/19, soon after placement of the splenic drain.    An additional problem that developed since his 5/28/19 discharge is swelling of the third metacarpal proximal phalangeal joint left hand that started without know traumatic precipitant on 6/1/19 (while he was still on the Augmentin).  He recollected no topical irritation or insect bite that might have triggered the inflammation.  It became more swollen and tender, so he presented to the [facility] " emergency room on 6/4/19 where the left hand of and around the third digit was noted to be swollen, warm, erythematous, and tender, with the swelling extending up to the dorsal midhand, but there was no obvious flexor tenosynovitis.  X-rays showed no bony abnormalities but possible gout in the setting of underlying osteoarthritis.  An attempt was made by Orthopedic Surgery to drain any associated fluid by the tap was dry.  With concern for infection, the Augmentin was continued.  He was also prescribed NSAID therapy with naproxen, but he says he did not take that for concern of nephrotoxicity.  He declined admission for IV antibiotic therapy.  Following the ER visit, that evening, he says the swelling worsened such that he was unable to flex that finger at all for several days and the erythema extended up to his wrist.  He attributes the worsening to the unsuccessful arthrocentesis in the ER.  He followed up with his primary physician at [facility] on 6/5/19 and was noted at that point to have a somewhat improved area of erythema that was ~ 6 x 4.5 cm.  Application of ice and acetaminophen were prescribed.  Subsequently, the zone of hand erythema has receded and lightened over the past six days, the finger is no longer as tender (and does not hurt when not touched or stretched), and he can now flex his left third finger enough to make a loose fist.  Mr. Don has been elevating his hand when feasible and is doing frequent gentle flexion exercises which seems to be gradually improving his range of motion, he believes.  Beyond the issues with the splenic fluid collection and the left hand, he feels otherwise status quo of late, without other complaints today, including no recent fever, night sweats, anorexia, fatigue, rash, myalgias/arthralgias, cough, dyspnea, EENT symptoms, chest pain, nausea, diarrhea, abdominal pain beyond the left upper drain, genitourinary symptoms, headache or other  neurological symptoms.    PAST MEDICAL HISTORY:  Past Medical History:   Diagnosis Date     Allergic state      Benign prostatic hyperplasia      Diabetes mellitus type 2, controlled, without complications (H)     controlled with diet and exercise      HTN (hypertension)     controlled with medication     Macular degeneration of right eye     receives injections q8w     Obstructive chronic bronchitis with exacerbation (H)      Past Surgical History:   Procedure Laterality Date     ENT SURGERY  1989    Surgery on nose      GI SURGERY  05/06/2019    Ablation on spleen      IR SINOGRAM INJECTION DIAGNOSTIC  6/14/2019     IR VISCERAL ANGIOGRAM  5/10/2019     ORTHOPEDIC SURGERY       OTHER SURGICAL HISTORY      Sinus/nose surgery many years ago     OTHER SURGICAL HISTORY      Trigger thumb     ALLERGIES:  Allergies   Allergen Reactions     Crestor [Rosuvastatin] Other (See Comments)     Joint Pain     No Known Drug Allergies      MEDICATIONS:  Current Outpatient Medications   Medication Sig Dispense Refill     acetaminophen (TYLENOL) 325 MG tablet Take 3 tablets (975 mg) by mouth every 8 hours as needed for mild pain       amLODIPine (NORVASC) 5 MG tablet   2     amoxicillin-clavulanate (AUGMENTIN) 875-125 MG tablet Take 1 tablet by mouth 2 times daily 14 tablet 0     calcipotriene (DOVONOX) 0.005 % external ointment        fluticasone (FLONASE) 50 MCG/ACT nasal spray SHAKE LQ AND U 2 SPRAYS IEN D  11     lovastatin (MEVACOR) 20 MG tablet   3     omega 3 1000 MG CAPS LW Comment:1200mg/daily       ONE DAILY MULTIPLE VITAMIN OR        tamsulosin (FLOMAX) 0.4 MG capsule Take 1 capsule (0.4 mg) by mouth At Bedtime 30 capsule 0     TRIAMTERENE-HCTZ 37.5-25 MG OR CAPS 1 CAPSULE DAILY 60 0     VITAMIN E 800 IU OR CAPS 1 tablet daily       ANDROGEL 50 MG/5GM TD PACK 1 pack topically q day (Patient not taking: Reported on 6/12/2019) 30 11     GLUCOSAMINE-CHONDROITIN 750-600 MG OR TABS 1 tablet at bedtime (Patient not taking:  Reported on 6/12/2019)       loratadine (CLARITIN) 10 MG tablet Take 1 tablet (10 mg) by mouth daily (Patient not taking: Reported on 6/12/2019)       LOTRISONE 1-0.05 % EX CREA apply as directed (Patient not taking: Reported on 6/12/2019) 30 gm tube 3     oxyCODONE (ROXICODONE) 5 MG tablet Take 0.5 tablets (2.5 mg) by mouth every 8 hours as needed for severe pain (Patient not taking: Reported on 6/4/2019) 8 tablet 0     polyethylene glycol (MIRALAX/GLYCOLAX) packet Take 17 g by mouth daily (Patient not taking: Reported on 6/12/2019) 14 packet 0     sodium chloride 0.9 % SOLN Irrigate with 1,000 mLs as directed daily Irrigate left drain with 10ml three times a day (Patient not taking: Reported on 6/12/2019) 1000 mL 1     VIAGRA 100 MG OR TABS 1 TABLET BY MOUTH AS DIRECTED (Patient not taking: Reported on 6/12/2019) 6 6     SOCIAL HISTORY:  Social History     Socioeconomic History     Marital status:      Spouse name: Not on file     Number of children: Not on file     Years of education: Not on file     Highest education level: Not on file   Occupational History     Not on file   Social Needs     Financial resource strain: Not on file     Food insecurity:     Worry: Not on file     Inability: Not on file     Transportation needs:     Medical: Not on file     Non-medical: Not on file   Tobacco Use     Smoking status: Former Smoker     Smokeless tobacco: Never Used   Substance and Sexual Activity     Alcohol use: Yes     Comment: moderate     Drug use: Never     Sexual activity: Not on file   Lifestyle     Physical activity:     Days per week: Not on file     Minutes per session: Not on file     Stress: Not on file   Relationships     Social connections:     Talks on phone: Not on file     Gets together: Not on file     Attends Jainism service: Not on file     Active member of club or organization: Not on file     Attends meetings of clubs or organizations: Not on file     Relationship status: Not on file      "Intimate partner violence:     Fear of current or ex partner: Not on file     Emotionally abused: Not on file     Physically abused: Not on file     Forced sexual activity: Not on file   Other Topics Concern     Not on file   Social History Narrative     Not on file     FAMILY HISTORY:  Family History   Problem Relation Age of Onset     C.A.D. Mother      Breast Cancer Mother         uterine      Genetic Disorder Mother         machular degeneration     Neurologic Disorder Father         parkinson     Eye Disorder Brother         photophobia     REVIEW OF SYSTEMS:  As per HPI.  Complete ROS is otherwise negative.    PHYSICAL EXAMINATION:  General:  A pleasant, conversant, WDWN, 78-year-old man in NAD sitting in a chair.  Vital signs:  /75   Pulse 92   Temp 97.9  F (36.6  C) (Oral)   Ht 1.6 m (5' 3\")   Wt 69.3 kg (152 lb 11.2 oz)   BMI 27.05 kg/m   .  Skin:  No acute rash or lesion beyond the left hand.  Head/Neck:  NCAT.  External auditory canals are patent without discharge.  PERRL.  EOMI.  Conjunctivae are pink without injection.  Sclerae are white.  Oropharynx lacks erythema, exudate, or lesion.  Dentition is grossly without inflammation.  Neck is supple, no lymphadenopathy or thyromegaly.  Lungs:  Bilaterally clear to auscultation.  CV:  RRR.  Normal S1, S2, without gallop, murmur, or rub.  Abdomen:  Bowel sounds active, soft, nontender except in the immediate area of the left upper lateral splenic LAURI drain, the drain site lacks inflammation, there is no substantial leakage around the drain, there is very minimal serosanguinous drainage in the drain, no hepatomegaly by percussion.  Back:  No lumbar or CVA tenderness.  Extremities:  The left dorsal third finger and posterior hand proximal to that has diffuse moderate erythema with mild edema centered at the MTP joint but without much warmth.  The erythema is not angry and is improved versus several days ago, per the patient and his wife.  He can flex " the finger sufficiently to make a loose fist without significant discomfort.  The right hand is normal.  Feet are distally warm, no edema.  Neuro:  Alert, oriented, memory/cognition/affect are normal, gait is normal.    LABORATORY STUDIES (Reviewed with the patient during his appointment today):      CRP Inflammation 06/12/2019 57.8* 0.0 - 8.0 mg/L Final     Sed Rate 06/12/2019 67* 0 - 20 mm/h Final     WBC 06/12/2019 9.9  4.0 - 11.0 10e9/L Final     RBC Count 06/12/2019 4.42  4.4 - 5.9 10e12/L Final     Hemoglobin 06/12/2019 12.6* 13.3 - 17.7 g/dL Final     Hematocrit 06/12/2019 39.9* 40.0 - 53.0 % Final     MCV 06/12/2019 90  78 - 100 fl Final     MCH 06/12/2019 28.5  26.5 - 33.0 pg Final     MCHC 06/12/2019 31.6  31.5 - 36.5 g/dL Final     RDW 06/12/2019 14.4  10.0 - 15.0 % Final     Platelet Count 06/12/2019 202  150 - 450 10e9/L Final     Diff Method 06/12/2019 Automated Method   Final     % Neutrophils 06/12/2019 77.4  % Final     % Lymphocytes 06/12/2019 14.6  % Final     % Monocytes 06/12/2019 6.2  % Final     % Eosinophils 06/12/2019 1.0  % Final     % Basophils 06/12/2019 0.4  % Final     % Immature Granulocytes 06/12/2019 0.4  % Final     Nucleated RBCs 06/12/2019 0  0 /100 Final     Absolute Neutrophil 06/12/2019 7.7  1.6 - 8.3 10e9/L Final     Absolute Lymphocytes 06/12/2019 1.4  0.8 - 5.3 10e9/L Final     Absolute Monocytes 06/12/2019 0.6  0.0 - 1.3 10e9/L Final     Absolute Eosinophils 06/12/2019 0.1  0.0 - 0.7 10e9/L Final     Absolute Basophils 06/12/2019 0.0  0.0 - 0.2 10e9/L Final     Abs Immature Granulocytes 06/12/2019 0.0  0 - 0.4 10e9/L Final     Absolute Nucleated RBC 06/12/2019 0.0   Final   Orders Only on 06/04/2019   Component Date Value Ref Range Status     WBC 06/04/2019 10.5  4.0 - 11.0 10e9/L Final     RBC Count 06/04/2019 4.21* 4.4 - 5.9 10e12/L Final     Hemoglobin 06/04/2019 12.2* 13.3 - 17.7 g/dL Final     Hematocrit 06/04/2019 38.8* 40.0 - 53.0 % Final     MCV 06/04/2019 92  78 -  100 fl Final     MCH 06/04/2019 29.0  26.5 - 33.0 pg Final     MCHC 06/04/2019 31.4* 31.5 - 36.5 g/dL Final     RDW 06/04/2019 14.1  10.0 - 15.0 % Final     Platelet Count 06/04/2019 204  150 - 450 10e9/L Final     Diff Method 06/04/2019 Automated Method   Final     % Neutrophils 06/04/2019 84.8  % Final     % Lymphocytes 06/04/2019 7.0  % Final     % Monocytes 06/04/2019 7.0  % Final     % Eosinophils 06/04/2019 0.3  % Final     % Basophils 06/04/2019 0.3  % Final     % Immature Granulocytes 06/04/2019 0.6  % Final     Nucleated RBCs 06/04/2019 0  0 /100 Final     Absolute Neutrophil 06/04/2019 8.9* 1.6 - 8.3 10e9/L Final     Absolute Lymphocytes 06/04/2019 0.7* 0.8 - 5.3 10e9/L Final     Absolute Monocytes 06/04/2019 0.7  0.0 - 1.3 10e9/L Final     Absolute Eosinophils 06/04/2019 0.0  0.0 - 0.7 10e9/L Final     Absolute Basophils 06/04/2019 0.0  0.0 - 0.2 10e9/L Final     Abs Immature Granulocytes 06/04/2019 0.1  0 - 0.4 10e9/L Final     Absolute Nucleated RBC 06/04/2019 0.0   Final     CRP Inflammation 06/04/2019 166.0* 0.0 - 8.0 mg/L Final     Sed Rate 06/04/2019 104* 0 - 20 mm/h Final     Sodium 05/24/2019 133  133 - 144 mmol/L   Potassium 05/24/2019 4.2  3.4 - 5.3 mmol/L   Chloride 05/24/2019 96  94 - 109 mmol/L   Carbon Dioxide 05/24/2019 27  20 - 32 mmol/L   Anion Gap 05/24/2019 9  3 - 14 mmol/L   Glucose 05/24/2019 184* 70 - 99 mg/dL   Urea Nitrogen 05/24/2019 8  7 - 30 mg/dL   Creatinine 05/24/2019 0.64* 0.66 - 1.25 mg/dL   GFR Estimate 05/24/2019 >90  >60 mL/min/[1.73_m2]   Comment: Non  GFR Calc  Starting 12/18/2018, serum creatinine based estimated GFR (eGFR) will be   calculated using the Chronic Kidney Disease Epidemiology Collaboration   (CKD-EPI) equation.     GFR Estimate If Black 05/24/2019 >90  >60 mL/min/[1.73_m2]   Comment: African American GFR Calc  Starting 12/18/2018, serum creatinine based estimated GFR (eGFR) will be   calculated using the Chronic Kidney Disease  Epidemiology Collaboration   (CKD-EPI) equation.     Calcium 2019 8.8  8.5 - 10.1 mg/dL   Bilirubin Total 2019 1.3  0.2 - 1.3 mg/dL   Albumin 2019 2.8* 3.4 - 5.0 g/dL   Protein Total 2019 7.3  6.8 - 8.8 g/dL   Alkaline Phosphatase 2019 78  40 - 150 U/L   ALT 2019 31  0 - 70 U/L   AST 2019 32  0 - 45 U/L   Specimen Description 2019 Blood Left Arm     Special Requests 2019 Received in aerobic bottle only     Culture Micro 2019 No growth     Lactate for Sepsis Protocol 2019 1.0  0.7 - 2.0 mmol/L   Sodium 2019 133  133 - 144 mmol/L   Potassium 2019 4.0  3.4 - 5.3 mmol/L   Chloride 2019 99  94 - 109 mmol/L   Carbon Dioxide 2019 26  20 - 32 mmol/L   Anion Gap 2019 8  3 - 14 mmol/L   Glucose 2019 182* 70 - 99 mg/dL   Urea Nitrogen 2019 8  7 - 30 mg/dL   Creatinine 2019 0.66  0.66 - 1.25 mg/dL   GFR Estimate 2019 >90  >60 mL/min/[1.73_m2]   Comment: Non  GFR Calc  Starting 2018, serum creatinine based estimated GFR (eGFR) will be   calculated using the Chronic Kidney Disease Epidemiology Collaboration   (CKD-EPI) equation.     GFR Estimate If Black 2019 >90  >60 mL/min/[1.73_m2]   Comment: African American GFR Calc  Starting 2018, serum creatinine based estimated GFR (eGFR) will be   calculated using the Chronic Kidney Disease Epidemiology Collaboration   (CKD-EPI) equation.     Calcium 2019 8.9  8.5 - 10.1 mg/dL   Magnesium 2019 1.9  1.6 - 2.3 mg/dL   Phosphorus 2019 3.5  2.5 - 4.5 mg/dL     Inflammatory Markers    Recent Labs   Lab Test 19  1524 19  0946   SED 67* 104*   CRP 57.8* 166.0*     Microbiology:  19  Abdominal abscess cxs:  Heavy growth P acnes.  Gram stain:  GP bacilli resembling diphtheroids, many WBCs (predominately PMNs).    IMAGIN19 Abd CT:  Large perisplenic fluid collection with indwelling drain. Compared to  5/27/2019, collection size is minimally increased. Slightly decreased amount of intralesional air.  Stable right lower lobe opacity. Continued attention on follow-up exams.  Decreased left lower lobe loculated pleural effusion.  Bilateral calcified atherosclerotic plaques, likely related to prior asbestos exposure.  6/4/19 Left hand x-rays:  Erosions at third and possibly second proximal interphalangeal joints. Given the relative preservation of the joint space especially of the third proximal interphalangeal joint, differential consideration include entities such as gout. If clinically indicated, dual-energy CT may be helpful.  Polyarticular osteoarthrosis, moderate.  5/27/19 Abdm CT:  Redemonstration (versus 5/25/19 scan) of splenic rupture status post intravascular coiling of the splenic artery. No evidence of splenic artery pseudoaneurysm or active extravasation.  Postprocedural changes of left retroperitoneal drain placement. No significant interval change in size of the left upper quadrant retroperitoneal collection, with continued foci of air within the collection. Findings remain compatible with infected retroperitoneal hematoma.  Unchanged loculated fluid tracking along the left major fissure and intralobular septal thickening within the both lung bases, suggestive of pulmonary edema. No significant change in right lower lobe consolidation.  5/25/19 Abdm CT / drain placement by IR:  12 [place] drain placed into the perisplenic hematoma via a left intercostal approach. Small amount of old appearing blood liquefied blood sent for Gram stain and culture.    IMPRESSION/PLAN:  A 78-year-old gentleman with a history of type 2 diabetes mellitus, polyarticular osteoarthritis, and pulmonary fibrosis who was hospitalized at [facility] from 5/10 - 16/19 with a splenic laceration due to trauma from a fall, treated with gel foam embolization and coiling was subsequently re-hospitalized 5/24 -28/19 with abdominal pain and  sepsis and discovered to have large subcapsular splenic hematoma into which a drain was placed by Interventional Radiology on 5/24/18.  The drainage Gram stain showed many PMNs with diphtheroids and the culture grew heavy Propionibacterium acnes.  He was placed initially on Zosyn and then transitioned to Augmentin 875 mg PO BID on 5/28/19 which he has remained on for two weeks through yesterday evening, 6/11/19.  He is in Infectious Disease Clinic today for follow-up of that presumed splenic abscess.  He also suffered a second, unrelated problem with an acute left third finger / hand inflammation starting 6/1/19.    ID issues:  1. Splenic subcapsular hematoma presumed to be infected with P acnes:  Constitutionally and by inflammatory markers and WBC, he is doing well and considerably improved, but by today's 6/12/19 abdominal CT scan the splenic fluid collection is not significantly decreased in size.  Per General Surgery (Dr. Latif), it has not been deemed appropriate for surgical drainage or resection.  It is not clear the the drain that was placed into the fluid collection on 5/24/19 is functioning at this point -- it has drained scant fluid since 6/9/19 (per the patient and his wife) and when flushed drainage emerges from around the drain, not from within it.  Consequently, the drain needs to be re-evaluated with a sinogram by Interventional Radiology to determine if it is appropriately positioned and to make certain that it is functioning as intended.  Based on today's CT scan, he is likely to continue to need drainage for some time and will need ongoing IR follow-up to determine when his fluid collection has involuted sufficiently to allow for eventual remove of the drain.  In the meanwhile, while awaiting an appointment in IR (hopefully within the next 48 hours), and to be safe because of his mild immunocompromise due to type 2 diabetes mellitus, we will continue the Augmenting therapy for one more week  (prescription written for 875 mg PO BID, #14), to make certain that we have fully covered for the apparent infection of the subcapsular hematoma that was presumed to be present on 5/24/19.  The Augmentin covers the isolated Propionibacterium acnes nicely and also provides some broader empiric coverage against other possible co-infecting pathogens.  Assuming no evidence of additional infection is found at his IR appointment, he will stop the antibiotic in one week and simply be observed at that point (with the presumption that the hematoma has been sterilized by then).  We will follow inflammatory markers over time.  2. Left third MTP joint-area inflammation:  The etiology of this sudden flare of inflammation is unclear.  It may have been gout (suggested by the hand x-ray) but he lacks a prior history of that.  He had no known inciting bug bite nor experienced other trauma to the hand, but thinks a bug bite might have been possible.  It is very questionalbe that the hand inflammation involved any infection, since it arose while he was on Augmentin and has subsequently steadily improved over the past five or so days without additional of any other antimicrobial besides the Augmentin.  At this point, with ongoing recent improvement, we will simply watch the hand for now, to see if the erythema and swelling fully resolve without further inflammation.  I encouraged him to continue to do gentle flexion range of motion stretches of the hand / third finger.  If his range of motion is still fairly constricted in two more weeks, a referral to Hand Occupational Therapy would be appropriate.    Plan:  - Referral to Interventional Radiology for a sinogram and determination if the left abdominal splenic drain requires adjustment, and also to give the patient some guidance on expected duration of the drain.  - I have ordered a supply of 10 ml prefilled saline syringes for flushing the drain, but recommended to the patient and his  wife that they hold off on flushing it until he is seen in Interventional Radiology (since the flushing does not seem to be effective at this moment).  I told them IR should provide them with instructions regarding how often to flush going forward.  - Continue the Augmenting 875 mg PO BID for one more week, then discontinue and watch.  - WBC and inflammatory markers checked today.  Will also order the same labs again for follow-up to be drawn in roughly another two to four weeks, as convenient.  - Since he will be followed by IR for the drain and also is followed by his primary physician, ongoing Infectious Disease Clinic follow-up would likely be redundant.  So we will not schedule him at this time for a future appointment in ID Clinic, but I told the patient and his wife I would be happy to discuss any questions or concerns with them over the phone or via MyChart in the future and remain available to see him again in clinic on an as needed basis if desired.

## 2019-07-08 ENCOUNTER — ANCILLARY PROCEDURE (OUTPATIENT)
Dept: GENERAL RADIOLOGY | Facility: CLINIC | Age: 79
End: 2019-07-08
Attending: PHYSICIAN ASSISTANT
Payer: COMMERCIAL

## 2019-07-08 DIAGNOSIS — D73.5 PERISPLENIC HEMATOMA: ICD-10-CM

## 2019-07-08 NOTE — DISCHARGE INSTRUCTIONS
Home Care after the Removal of Your Drainage Tube    One of our Radiology PAs performed this procedure for you today:    Keron Spence, [facility] [place] CANDE Mata MPAS, CANDE Yu PA-C Molly Soran, MS, PAVANITA    After you go home:    Keep the site covered with a dressing until the wound has a scab. Once a scab has formed, you may leave the wound open to air.     You may shower. Do not swim, soak in water or take a bath until your wound has healed (usually about 72 hours).     When to Call the Doctor:  Call you doctor if your wound shows any signs of infection, such as:    Redness     Swelling    Pain     Fever greater than 100.5     If you have questions or concerns, please call Interventional Radiology at [phone] or  [phone]    After hours you will need to call [phone] and ask to have the Interventional Radiologist on call paged.

## 2019-07-18 ENCOUNTER — PATIENT OUTREACH (OUTPATIENT)
Dept: SURGERY | Facility: CLINIC | Age: 79
End: 2019-07-18

## 2019-07-18 NOTE — PROGRESS NOTES
"Patient and his spouse calling the office with concerns regarding site where PEG was removed 7/8.  They state that the area is bleeding, there is \"gunk\" on the dressing, and note tissue at the site.      Patient is up ad laith, tolerating po, and afebrile.  They state that the bleeding started this morning and the dressing is not becoming saturated.  The dressing was placed approximately 8 hours ago and red blood and stomach contents are noted. Encouraged keeping the area clean and dry- changing the dressing PRN and applying pressure/cold compress.  He may take Tylenol for discomfort.    Clinic appointment arranged for tomorrow with Dr. Latif; bleeding/tissue possibly related to hypergranulation tissue.  Provider may possibly cauterize with Silver Nitrate sticks.  If bleeding worsens or new/changing symptoms the patient was asked to go to the ED for an evaluation.  All questions were answered and they were agreeable with this recommendation.  "

## 2019-07-19 ENCOUNTER — OFFICE VISIT (OUTPATIENT)
Dept: SURGERY | Facility: CLINIC | Age: 79
End: 2019-07-19
Payer: COMMERCIAL

## 2019-07-19 VITALS
WEIGHT: 146 LBS | OXYGEN SATURATION: 97 % | HEIGHT: 63 IN | HEART RATE: 99 BPM | SYSTOLIC BLOOD PRESSURE: 119 MMHG | BODY MASS INDEX: 25.87 KG/M2 | DIASTOLIC BLOOD PRESSURE: 71 MMHG

## 2019-07-19 DIAGNOSIS — D73.3 ABSCESS OF SPLEEN: Primary | ICD-10-CM

## 2019-07-19 RX ORDER — AZITHROMYCIN 500 MG/1
500 TABLET, FILM COATED ORAL DAILY
COMMUNITY
Start: 2019-07-15 | End: 2019-07-19

## 2019-07-19 ASSESSMENT — ENCOUNTER SYMPTOMS
NIGHT SWEATS: 0
CHILLS: 1
COUGH DISTURBING SLEEP: 1
NECK PAIN: 1
MUSCLE WEAKNESS: 1
SHORTNESS OF BREATH: 1
POLYPHAGIA: 0
STIFFNESS: 0
WEIGHT GAIN: 0
JOINT SWELLING: 0
POLYDIPSIA: 0
HEMOPTYSIS: 0
MYALGIAS: 0
DECREASED APPETITE: 1
COUGH: 1
WHEEZING: 1
ALTERED TEMPERATURE REGULATION: 1
HALLUCINATIONS: 0
FEVER: 0
MUSCLE CRAMPS: 0
POSTURAL DYSPNEA: 0
WEIGHT LOSS: 0
ARTHRALGIAS: 0
INCREASED ENERGY: 1
SPUTUM PRODUCTION: 0
SNORES LOUDLY: 0
BACK PAIN: 0
DYSPNEA ON EXERTION: 1
FATIGUE: 0

## 2019-07-19 ASSESSMENT — MIFFLIN-ST. JEOR: SCORE: 1287.38

## 2019-07-19 ASSESSMENT — PAIN SCALES - GENERAL: PAINLEVEL: NO PAIN (0)

## 2019-07-19 NOTE — LETTER
7/19/2019       RE: Jayden Don  [address] [address]     Dear Colleague,    Thank you for referring your patient, Jayden Don, to the [facility] SURGERY at [facility]. Please see a copy of my visit note below.    Jayden Don is a 78-year-old male well known to the Surgery Service.  The patient is status post fall with injury to the spleen.  The patient had embolization of a bleeding spleen that resulted in necrosis and abscess formation, which has been drained percutaneously.  The drain was removed; however, material continues to drain from the drain site.  Granulation tissue, which is abundant at the skin level, also has bleeding noted.  Clots are passing, and the patient does feel better after passage of clots from the drain site.  The patient is otherwise active.  He has regained much of his ability for activities of daily living.  The patient's left hand has now improved significantly with elevation.  There remains some mild swelling or edema of the hand and also minimal tenderness of the MP joint on the left hand.  The patient is otherwise doing well.      PHYSICAL EXAMINATION:  Jayden Don moves easily to the examination table.  The area of the left flank at the drain site has abundant granulation tissue, which was cauterized with silver nitrate in the Surgery Clinic.  The patient's left hand has markedly improved.  A mild amount of tenderness exists in the MP joint of the long finger.  This area has minimal erythema.  There is also some edema of the hand in general, but with marked improvement in the ability and decreased erythema and tenderness.      IMPRESSION:  Mr. Jayden Don continues to have fluid drainage and clot drainage from the spleen percutaneous IR drain site and some granulation tissue.  At this point, no additional drainage would be recommended.  The patient will follow up in the Surgery Clinic on a p.r.n.  basis or will check in in approximately 1-2 weeks.  I recommend he continue with the antibiotic soap on a daily basis, the silver nitrate treatments and followup regarding increased pain, fever, chills or other problems associated with the splenic abscess.     Again, thank you for allowing me to participate in the care of your patient.      Sincerely,    Ivan Latif MD

## 2019-07-19 NOTE — PATIENT INSTRUCTIONS
You met with Dr. Ivan Latif.      Today's visit instructions:    Return to the Surgery Clinic in approximately 2 weeks.  Please call Jamilah or Anselmo to arrange (call 1-2 days before needed appointment).  You may be seen in the clinic or hospital clinic based on date.  Please call [phone].     If you have questions please contact Anselmo RN or Jamilah RN during regular clinic hours, Monday through Friday 7:30 AM - 4:00 PM, or you can contact us via [place] at anytime.       If you have urgent needs after-hours, weekends, or holidays please call the hospital at [phone] and ask to speak with our on-call General Surgery Team.    Appointment [phone], option #1   Nurse Advice (Anselmo or Jamilah): [phone]   Surgery Scheduler ([place]): [phone]  Fax: [phone]

## 2019-07-19 NOTE — NURSING NOTE
"Chief Complaint   Patient presents with     RECHECK     pt here for PEG site issue with pain and bleeding last night (now resolved); recent URI       Vitals:    07/19/19 1009   BP: 119/71   BP Location: Left arm   Patient Position: Chair   Cuff Size: Adult Regular   Pulse: 99   SpO2: 97%   Weight: 66.2 kg (146 lb)   Height: 1.6 m (5' 3\")       Body mass index is 25.86 kg/m .    Hans Landeros, EMT    "

## 2019-07-21 NOTE — PROGRESS NOTES
Jayden Don is a 78-year-old male well known to the Surgery Service.  The patient is status post fall with injury to the spleen.  The patient had embolization of a bleeding spleen that resulted in necrosis and abscess formation, which has been drained percutaneously.  The drain was removed; however, material continues to drain from the drain site.  Granulation tissue, which is abundant at the skin level, also has bleeding noted.  Clots are passing, and the patient does feel better after passage of clots from the drain site.  The patient is otherwise active.  He has regained much of his ability for activities of daily living.  The patient's left hand has now improved significantly with elevation.  There remains some mild swelling or edema of the hand and also minimal tenderness of the MP joint on the left hand.  The patient is otherwise doing well.      PHYSICAL EXAMINATION:  Jayden Don moves easily to the examination table.  The area of the left flank at the drain site has abundant granulation tissue, which was cauterized with silver nitrate in the Surgery Clinic.  The patient's left hand has markedly improved.  A mild amount of tenderness exists in the MP joint of the long finger.  This area has minimal erythema.  There is also some edema of the hand in general, but with marked improvement in the ability and decreased erythema and tenderness.      IMPRESSION:  Mr. Jayden Don continues to have fluid drainage and clot drainage from the spleen percutaneous IR drain site and some granulation tissue.  At this point, no additional drainage would be recommended.  The patient will follow up in the Surgery Clinic on a p.r.n. basis or will check in in approximately 1-2 weeks.  I recommend he continue with the antibiotic soap on a daily basis, the silver nitrate treatments and followup regarding increased pain, fever, chills or other problems associated with the splenic abscess.       Answers for HPI/ROS submitted  by the patient on 7/19/2019   General Symptoms: Yes  Skin Symptoms: No  HENT Symptoms: No  EYE SYMPTOMS: No  HEART SYMPTOMS: No  LUNG SYMPTOMS: Yes  INTESTINAL SYMPTOMS: No  URINARY SYMPTOMS: No  REPRODUCTIVE SYMPTOMS: No  SKELETAL SYMPTOMS: Yes  BLOOD SYMPTOMS: No  NERVOUS SYSTEM SYMPTOMS: No  MENTAL HEALTH SYMPTOMS: No  Fever: No  Loss of appetite: Yes  Weight loss: No  Weight gain: No  Fatigue: No  Night sweats: No  Chills: Yes  Increased stress: No  Excessive hunger: No  Excessive thirst: No  Feeling hot or cold when others believe the temperature is normal: Yes  Loss of height: No  Post-operative complications: Yes  Surgical site pain: Yes  Hallucinations: No  Change in or Loss of Energy: Yes  Hyperactivity: No  Confusion: No  Cough: Yes  Sputum or phlegm: No  Coughing up blood: No  Difficulty breating or shortness of breath: Yes  Snoring: No  Wheezing: Yes  Difficulty breathing on exertion: Yes  Nighttime Cough: Yes  Difficulty breathing when lying flat: No  Back pain: No  Muscle aches: No  Neck pain: Yes  Swollen joints: No  Joint pain: No  Bone pain: No  Muscle cramps: No  Muscle weakness: Yes  Joint stiffness: No

## 2019-08-07 ENCOUNTER — OFFICE VISIT (OUTPATIENT)
Dept: SURGERY | Facility: CLINIC | Age: 79
End: 2019-08-07
Attending: SURGERY
Payer: COMMERCIAL

## 2019-08-07 VITALS
RESPIRATION RATE: 12 BRPM | OXYGEN SATURATION: 98 % | SYSTOLIC BLOOD PRESSURE: 143 MMHG | DIASTOLIC BLOOD PRESSURE: 85 MMHG | HEART RATE: 90 BPM

## 2019-08-07 NOTE — LETTER
8/7/2019      RE: Jayden Don  [address]  [place] [address]       No notes on file    SOC PROVIDER

## 2019-08-07 NOTE — Clinical Note
8/7/2019       RE: Jayden Don  [address] [address]     Dear Colleague,    Thank you for referring your patient, Jayden Don, to the [facility] SURGICAL CARE OUTPATIENT at [facility]. Please see a copy of my visit note below.    No notes on file    Again, thank you for allowing me to participate in the care of your patient.      Sincerely,    SOC PROVIDER

## 2019-08-07 NOTE — LETTER
Date:November 11, 2019      Provider requested that no letter be sent. Do not send.       [facility] Health Information

## 2019-08-07 NOTE — LETTER
8/7/2019       RE: Jayden Don  [address] [address]     Dear Colleague,    Thank you for referring your patient, Jayden Don, to the [facility] SURGICAL CARE OUTPATIENT at [facility]. Please see a copy of my visit note below.    No notes on file    Again, thank you for allowing me to participate in the care of your patient.      Sincerely,    SOC PROVIDER

## 2019-08-07 NOTE — NURSING NOTE
Pt arrived in endoscopy for a follow up visit with Dr Latif at 10AM. Dr Latif at bedside by 10:15 and spent 30 minutes with Pt. Md requested a package of silver nitrate sticks to use for dressing change. Pt and his spouse left the unit at 11:00

## 2019-08-07 NOTE — LETTER
8/7/2019     RE: Jayden Don  [address] [address]     Dear Colleague,    Thank you for referring your patient, Jayden Don, to the [facility] SURGICAL CARE OUTPATIENT at [facility]. Please see a copy of my visit note below.    No notes on file    Again, thank you for allowing me to participate in the care of your patient.      Sincerely,    SOC PROVIDER

## 2019-08-16 ENCOUNTER — TELEPHONE (OUTPATIENT)
Dept: SURGERY | Facility: CLINIC | Age: 79
End: 2019-08-16

## 2019-08-16 NOTE — TELEPHONE ENCOUNTER
Pre Visit Call and Assessment    Date of call:  08/16/2019    Phone numbers:  Home number on file [phone] (home)    Reached patient/confirmed appointment:  No - left message:   on voicemail  Patient care team/Primary provider:  Ro Huggins    Referred to:  Dr. Ivan Latif    Reason for visit:  Hospital follow up

## 2019-08-19 ENCOUNTER — OFFICE VISIT (OUTPATIENT)
Dept: SURGERY | Facility: CLINIC | Age: 79
End: 2019-08-19
Payer: COMMERCIAL

## 2019-08-19 VITALS
DIASTOLIC BLOOD PRESSURE: 78 MMHG | HEIGHT: 63 IN | OXYGEN SATURATION: 98 % | HEART RATE: 89 BPM | WEIGHT: 151 LBS | SYSTOLIC BLOOD PRESSURE: 124 MMHG | BODY MASS INDEX: 26.75 KG/M2

## 2019-08-19 DIAGNOSIS — S36.039D LACERATION OF SPLEEN, SUBSEQUENT ENCOUNTER: ICD-10-CM

## 2019-08-19 DIAGNOSIS — D73.3 ABSCESS OF SPLEEN: Primary | ICD-10-CM

## 2019-08-19 RX ORDER — SULFAMETHOXAZOLE/TRIMETHOPRIM 800-160 MG
1 TABLET ORAL 2 TIMES DAILY
Qty: 20 TABLET | Refills: 0 | Status: SHIPPED | OUTPATIENT
Start: 2019-08-19 | End: 2019-09-05

## 2019-08-19 ASSESSMENT — ENCOUNTER SYMPTOMS
ABDOMINAL PAIN: 0
INSOMNIA: 1
HEARTBURN: 0
HALLUCINATIONS: 0
MUSCLE WEAKNESS: 1
HEMOPTYSIS: 0
SPUTUM PRODUCTION: 0
SYNCOPE: 0
LOSS OF CONSCIOUSNESS: 0
VOMITING: 0
WEIGHT GAIN: 0
FATIGUE: 0
DIZZINESS: 1
ORTHOPNEA: 0
DECREASED CONCENTRATION: 0
NERVOUS/ANXIOUS: 1
BLOATING: 0
LIGHT-HEADEDNESS: 1
EXERCISE INTOLERANCE: 0
DIARRHEA: 0
LEG PAIN: 0
SKIN CHANGES: 1
SEIZURES: 0
WHEEZING: 0
FEVER: 0
COUGH: 0
NAIL CHANGES: 0
BACK PAIN: 1
HEADACHES: 0
SHORTNESS OF BREATH: 1
HYPERTENSION: 0
ALTERED TEMPERATURE REGULATION: 1
INCREASED ENERGY: 0
TINGLING: 0
PALPITATIONS: 0
JOINT SWELLING: 0
COUGH DISTURBING SLEEP: 0
DECREASED APPETITE: 0
NAUSEA: 1
SLEEP DISTURBANCES DUE TO BREATHING: 0
CHILLS: 1
DISTURBANCES IN COORDINATION: 0
POSTURAL DYSPNEA: 0
BLOOD IN STOOL: 0
NECK PAIN: 1
MEMORY LOSS: 0
DEPRESSION: 1
SNORES LOUDLY: 0
ARTHRALGIAS: 0
DYSPNEA ON EXERTION: 0
STIFFNESS: 0
PANIC: 0
HYPOTENSION: 0
MUSCLE CRAMPS: 0
CONSTIPATION: 0
WEIGHT LOSS: 0
NIGHT SWEATS: 1
POLYDIPSIA: 0
MYALGIAS: 0
SPEECH CHANGE: 0

## 2019-08-19 ASSESSMENT — MIFFLIN-ST. JEOR: SCORE: 1310.06

## 2019-08-19 ASSESSMENT — PAIN SCALES - GENERAL: PAINLEVEL: NO PAIN (1)

## 2019-08-19 NOTE — LETTER
8/19/2019       RE: Jayden Don  [address]] [address]     Dear Colleague,    Thank you for referring your patient, Jayden Don, to the [facility] GENERAL SURGERY at [facility]. Please see a copy of my visit note below.    HISTORY OF PRESENT ILLNESS:  Jayden Don is well known to Surgery Service.  Presents with a continued draining sinus in the left flank that is associated with an abscess of the spleen following embolization.  The patient had a traumatic injury with a bleeding spleen that was embolized.  The embolization was associated later with a splenic abscess which drained percutaneously.  The drain was removed and the patient continues to have a small amount of drainage material.  He also recently describes chills at night and chills during the day.  He denies fever.  He is eating a regular diet, gaining weight, having normal bowel movements.  He has no watery diarrhea at this time.      PHYSICAL EXAMINATION:  Mr. Jayden Don appears completely nontoxic today.  He moves easily to the examination table.  His extremities are warm and well perfused.  Abdomen is soft and nontender.  No masses.  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  The area of drainage in the left flank demonstrates a small amount of inflammation approximately 1 cm in diameter around the draining sinus which is draining still some purulent material.  There is minimal induration of the tissue and the subcutaneous tissue in  association of the drain site.  No other abnormalities were noted.      IMPRESSION:  Mr. Thea Don is doing very well following drainage of a splenic abscess after embolization.  The patient has continued sinus drainage and he does have some episodes of chills but no fever.  At this point, he is reluctant to pursue a CT scan and additional drainage procedure.  However, we have decided that 7-10 days of a double strength Bactrim, Prograf  antibiotics would be potential middle ground for recovery as he moves forward to increasing his activities and stamina.  We look forward to seeing Mr. Jayden Don [facility] again.  He will follow up with Interventional Radiology for CT scan with IV and oral contrast and possible drainage of the splenic abscess if necessary if he fails antibiotic therapy treatment plan.     Again, thank you for allowing me to participate in the care of your patient.      Sincerely,  Ivan Latif MD

## 2019-08-19 NOTE — NURSING NOTE
"Chief Complaint   Patient presents with     RECHECK     splenic lacertion f/u       Vitals:    08/19/19 0824   BP: 124/78   BP Location: Left arm   Patient Position: Chair   Cuff Size: Adult Regular   Pulse: 89   SpO2: 98%   Weight: 68.5 kg (151 lb)   Height: 1.6 m (5' 3\")       Body mass index is 26.75 kg/m .    Hans Landeros EMT    "

## 2019-08-19 NOTE — PROGRESS NOTES
HISTORY OF PRESENT ILLNESS:  Jayden Don is well known to Surgery Service.  Presents with a continued draining sinus in the left flank that is associated with an abscess of the spleen following embolization.  The patient had a traumatic injury with a bleeding spleen that was embolized.  The embolization was associated later with a splenic abscess which drained percutaneously.  The drain was removed and the patient continues to have a small amount of drainage material.  He also recently describes chills at night and chills during the day.  He denies fever.  He is eating a regular diet, gaining weight, having normal bowel movements.  He has no watery diarrhea at this time.      PHYSICAL EXAMINATION:  Mr. Jayden Don appears completely nontoxic today.  He moves easily to the examination table.  His extremities are warm and well perfused.  Abdomen is soft and nontender.  No masses.  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  The area of drainage in the left flank demonstrates a small amount of inflammation approximately 1 cm in diameter around the draining sinus which is draining still some purulent material.  There is minimal induration of the tissue and the subcutaneous tissue in  association of the drain site.  No other abnormalities were noted.      IMPRESSION:  Mr. Thea Don is doing very well following drainage of a splenic abscess after embolization.  The patient has continued sinus drainage and he does have some episodes of chills but no fever.  At this point, he is reluctant to pursue a CT scan and additional drainage procedure.  However, we have decided that 7-10 days of a double strength Bactrim, Prograf antibiotics would be potential middle ground for recovery as he moves forward to increasing his activities and stamina.  We look forward to seeing [facility]. Jayden [facility].  He will follow up with Interventional Radiology for CT scan with IV and oral contrast and possible  drainage of the splenic abscess if necessary if he fails antibiotic therapy treatment plan.       Answers for HPI/ROS submitted by the patient on 8/19/2019   General Symptoms: Yes  Skin Symptoms: Yes  HENT Symptoms: No  EYE SYMPTOMS: No  HEART SYMPTOMS: Yes  LUNG SYMPTOMS: Yes  INTESTINAL SYMPTOMS: Yes  URINARY SYMPTOMS: No  REPRODUCTIVE SYMPTOMS: No  SKELETAL SYMPTOMS: Yes  BLOOD SYMPTOMS: No  NERVOUS SYSTEM SYMPTOMS: Yes  MENTAL HEALTH SYMPTOMS: Yes  Fever: No  Loss of appetite: No  Weight loss: No  Weight gain: No  Fatigue: No  Night sweats: Yes  Chills: Yes  Excessive thirst: No  Feeling hot or cold when others believe the temperature is normal: Yes  Loss of height: No  Post-operative complications: No  Surgical site pain: No  Hallucinations: No  Change in or Loss of Energy: No  Hyperactivity: No  Changes in hair: No  Changes in moles/birth marks: Yes  Itching: No  Rashes: No  Changes in nails: No  Acne: No  Change in facial hair: No  Warts: No  Cough: No  Sputum or phlegm: No  Coughing up blood: No  Difficulty breating or shortness of breath: Yes  Snoring: No  Wheezing: No  Difficulty breathing on exertion: No  Nighttime Cough: No  Difficulty breathing when lying flat: No  Chest pain or pressure: Yes  Fast or irregular heartbeat: No  Pain in legs with walking: No  Trouble breathing while lying down: No  Fingers or toes appear blue: No  High blood pressure: No  Low blood pressure: No  Fainting: No  Murmurs: No  Pacemaker: No  Varicose veins: Yes  Edema or swelling: No  Wake up at night with shortness of breath: No  Light-headedness: Yes  Exercise intolerance: No  Heart burn or indigestion: No  Nausea: Yes  Vomiting: No  Abdominal pain: No  Bloating: No  Constipation: No  Diarrhea: No  Blood in stool: No  Back pain: Yes  Muscle aches: No  Neck pain: Yes  Swollen joints: No  Joint pain: No  Bone pain: No  Muscle cramps: No  Muscle weakness: Yes  Joint stiffness: No  Bone fracture: No  Trouble with coordination:  No  Dizziness or trouble with balance: Yes  Fainting or black-out spells: No  Memory loss: No  Headache: No  Seizures: No  Speech problems: No  Tingling: No  Nervous or Anxious: Yes  Depression: Yes  Trouble sleeping: Yes  Trouble thinking or concentrating: No  Mood changes: No  Panic attacks: No

## 2019-08-19 NOTE — PATIENT INSTRUCTIONS
You met with Dr. Ivan Latif.      Today's visit instructions:    A prescription for antibiotic was sent to [facility].  Please take it twice a day for 10 days.      Appointment arranged with Dr. Latif  at 9 AM.  Appointment is in the hospital Clinic ([address], 1st Floor - Endoscopy).      If no improvement, a CT scan needs to be completed to evaluate your spleen. After this is done, Dr. Latif would like you to consult with the Interventional Team ([phone]).     If you have questions please contact Anselmo RN or Jamilah RN during regular clinic hours, Monday through Friday 7:30 AM - 4:00 PM, or you can contact us via [facility] [place] at anytime.       If you have urgent needs after-hours, weekends, or holidays please call the hospital at [phone] and ask to speak with our on-call General Surgery Team.    Appointment [phone], option #1   Nurse Advice (Anselmo or Jamilah): [phone]   Surgery Scheduler ([place]): [phone]  Fax: [phone]

## 2019-09-05 ENCOUNTER — OFFICE VISIT (OUTPATIENT)
Dept: SURGERY | Facility: CLINIC | Age: 79
End: 2019-09-05
Attending: SURGERY
Payer: COMMERCIAL

## 2019-09-05 ENCOUNTER — PATIENT OUTREACH (OUTPATIENT)
Dept: SURGERY | Facility: CLINIC | Age: 79
End: 2019-09-05

## 2019-09-05 ENCOUNTER — HOSPITAL ENCOUNTER (OUTPATIENT)
Dept: CT IMAGING | Facility: CLINIC | Age: 79
Discharge: HOME | End: 2019-09-05
Attending: SURGERY | Admitting: SURGERY
Payer: COMMERCIAL

## 2019-09-05 VITALS
HEART RATE: 91 BPM | TEMPERATURE: 98.4 F | DIASTOLIC BLOOD PRESSURE: 80 MMHG | RESPIRATION RATE: 16 BRPM | OXYGEN SATURATION: 99 % | SYSTOLIC BLOOD PRESSURE: 136 MMHG

## 2019-09-05 DIAGNOSIS — D73.3 ABSCESS OF SPLEEN: ICD-10-CM

## 2019-09-05 DIAGNOSIS — D73.3 SPLENIC ABSCESS: Primary | ICD-10-CM

## 2019-09-05 DIAGNOSIS — D73.3 ABSCESS OF SPLEEN: Primary | ICD-10-CM

## 2019-09-05 PROCEDURE — 25000128 H RX IP 250 OP 636: Performed by: STUDENT IN AN ORGANIZED HEALTH CARE EDUCATION/TRAINING PROGRAM

## 2019-09-05 PROCEDURE — G0463 HOSPITAL OUTPT CLINIC VISIT: HCPCS | Mod: 25

## 2019-09-05 PROCEDURE — 74177 CT ABD & PELVIS W/CONTRAST: CPT

## 2019-09-05 RX ORDER — IOPAMIDOL 755 MG/ML
92 INJECTION, SOLUTION INTRAVASCULAR ONCE
Status: COMPLETED | OUTPATIENT
Start: 2019-09-05 | End: 2019-09-05

## 2019-09-05 RX ADMIN — IOPAMIDOL 92 ML: 755 INJECTION, SOLUTION INTRAVENOUS at 09:22

## 2019-09-05 ASSESSMENT — PAIN SCALES - GENERAL: PAINLEVEL: NO PAIN (0)

## 2019-09-05 NOTE — NURSING NOTE
Pt roomed at 0830.  Dr. Latif here to see pt.  Dressing change done by Dr. Latif to abdomen using 2x2 gauze and 2 small tegaderms.   Discharged [id number].

## 2019-09-05 NOTE — PROGRESS NOTES
CT scan completed and results are now available.  Await recommendations from provider.      Dr. Latif reviewed CT scan and was in direct contact per message.      RE: CT results   Received: Today   Message Contents   Ivan Latif MD Hartl, Jamilah Rodríguez, RN             Dear Jamilah Kumar,     I spoke with the patient and the patient's wife on the phone this evening at the start of my overnight shift.  I will speak with them again this morning, Friday the 6th of September.  They are interested to make sure that we find a way to fully resolve this abscess using the least invasive but most effective manner.     Please let them  know that I'm working on solving this issue.  It may be that I won't be able to give this my full attention until I return to day time duties as staff of the General Surgery service next week on Monday the 9th of September.  Please feel free to communicate with  and Ms. Don and let them know that I'll be working on this matter.  Thank you.     [facility]

## 2019-09-05 NOTE — LETTER
9/5/2019       RE: Jayden Don  [address] [facility] [address]     Dear Colleague,    Thank you for referring your patient, Jayden Don, to the [facility] SURGICAL CARE OUTPATIENT at [facility]. Please see a copy of my visit note below.    Surgery Clinic Staff:    HISTORY OF PRESENT ILLNESS:  Jayden Don is well known to Surgery Service.  Presents with a continued draining sinus in the left flank associated an abscess of the spleen following IR embolization.  The patient had a traumatic injury to the spleen that was embolized.  The embolization was associated later with a splenic abscess which was drained percutaneously.  The drain was removed and the patient continues to have a small amount of drainage material.   He is eating a regular diet, gaining weight, having normal bowel movements.     PAST MEDICAL HISTORY:  Past Medical History:   Diagnosis Date     Allergic state      Benign prostatic hyperplasia      Diabetes mellitus type 2, controlled, without complications (H)     controlled with diet and exercise      HTN (hypertension)     controlled with medication     Macular degeneration of right eye     receives injections q8w     Obstructive chronic bronchitis with exacerbation (H)         PAST SURGICAL HISTORY:  Past Surgical History:   Procedure Laterality Date     ENT SURGERY  1989    Surgery on nose      GI SURGERY  05/06/2019    Ablation on spleen      IR SINOGRAM INJECTION DIAGNOSTIC  6/14/2019     IR SINOGRAM INJECTION DIAGNOSTIC  7/8/2019     IR VISCERAL ANGIOGRAM  5/10/2019     ORTHOPEDIC SURGERY       OTHER SURGICAL HISTORY      Sinus/nose surgery many years ago     OTHER SURGICAL HISTORY      Trigger thumb        MEDICATIONS:  Current Outpatient Medications   Medication     loratadine (CLARITIN) 10 MG tablet     LOTRISONE 1-0.05 % EX CREA     acetaminophen (TYLENOL) 325 MG tablet     amLODIPine (NORVASC) 5 MG tablet     calcipotriene (DOVONOX)  0.005 % external ointment     lovastatin (MEVACOR) 20 MG tablet     omega 3 1000 MG CAPS     ONE DAILY MULTIPLE VITAMIN OR     tamsulosin (FLOMAX) 0.4 MG capsule     TRIAMTERENE-HCTZ 37.5-25 MG OR CAPS     VIAGRA 100 MG OR TABS     VITAMIN E 800 IU OR CAPS     No current facility-administered medications for this visit.         ALLERGIES:  Allergies   Allergen Reactions     Aspirin      GI upset     Atorvastatin       Myalgias     Crestor [Rosuvastatin] Other (See Comments)     Joint Pain        SOCIAL HISTORY:  Social History     Socioeconomic History     Marital status:      Spouse name: Not on file     Number of children: Not on file     Years of education: Not on file     Highest education level: Not on file   Occupational History     Not on file   Social Needs     Financial resource strain: Not on file     Food insecurity:     Worry: Not on file     Inability: Not on file     Transportation needs:     Medical: Not on file     Non-medical: Not on file   Tobacco Use     Smoking status: Former Smoker     Smokeless tobacco: Never Used   Substance and Sexual Activity     Alcohol use: Yes     Comment: moderate     Drug use: Never     Sexual activity: Not on file   Lifestyle     Physical activity:     Days per week: Not on file     Minutes per session: Not on file     Stress: Not on file   Relationships     Social connections:     Talks on phone: Not on file     Gets together: Not on file     Attends Mandaen service: Not on file     Active member of club or organization: Not on file     Attends meetings of clubs or organizations: Not on file     Relationship status: Not on file     Intimate partner violence:     Fear of current or ex partner: Not on file     Emotionally abused: Not on file     Physically abused: Not on file     Forced sexual activity: Not on file   Other Topics Concern     Not on file   Social History Narrative     Not on file       FAMILY HISTORY:  Family History   Problem Relation Age of  Onset     C.A.D. Mother      Breast Cancer Mother         uterine      Genetic Disorder Mother         machular degeneration     Neurologic Disorder Father         parkinson     Eye Disorder Brother         photophobia     PHYSICAL EXAM:  Vital Signs: /80 (BP Location: Right arm)   Pulse 91   Temp 98.4  F (36.9  C)   Resp 16   SpO2 99%   GEN: Mr. Jayden Don appears completely nontoxic today.  He moves easily to the examination table.   ABD: Abdomen is soft and nontender.  No masses.  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  The area of drainage in the left flank demonstrates a small amount of granulation tissue.   No other abnormalities were noted.   EXT: His extremities are warm and well perfused.       IMPRESSION:  Mr. Jayden Don has continued sinus drainage and he completed a 7 day course of a double strength Bactrim.  I discussed the patient care plan with our  and he recommended repeat Interventional Radiology.  I have discussed this with the patient and the patient has decided to move his care to the [facility] system.          Again, thank you for allowing me to participate in the care of your patient.      Sincerely,    SOC PROVIDER

## 2019-09-09 ENCOUNTER — TELEPHONE (OUTPATIENT)
Dept: INTERVENTIONAL RADIOLOGY/VASCULAR | Facility: CLINIC | Age: 79
End: 2019-09-09

## 2019-09-09 DIAGNOSIS — D73.3 ABSCESS OF SPLEEN: Primary | ICD-10-CM

## 2019-09-10 ENCOUNTER — TELEPHONE (OUTPATIENT)
Dept: INTERVENTIONAL RADIOLOGY/VASCULAR | Facility: CLINIC | Age: 79
End: 2019-09-10

## 2019-09-26 NOTE — PROGRESS NOTES
Surgery Clinic Staff:    HISTORY OF PRESENT ILLNESS:  Jayden Don is well known to Surgery Service.  Presents with a continued draining sinus in the left flank associated an abscess of the spleen following IR embolization.  The patient had a traumatic injury to the spleen that was embolized.  The embolization was associated later with a splenic abscess which was drained percutaneously.  The drain was removed and the patient continues to have a small amount of drainage material.   He is eating a regular diet, gaining weight, having normal bowel movements.     PAST MEDICAL HISTORY:  Past Medical History:   Diagnosis Date     Allergic state      Benign prostatic hyperplasia      Diabetes mellitus type 2, controlled, without complications (H)     controlled with diet and exercise      HTN (hypertension)     controlled with medication     Macular degeneration of right eye     receives injections q8w     Obstructive chronic bronchitis with exacerbation (H)         PAST SURGICAL HISTORY:  Past Surgical History:   Procedure Laterality Date     ENT SURGERY  1989    Surgery on nose      GI SURGERY  05/06/2019    Ablation on spleen      IR SINOGRAM INJECTION DIAGNOSTIC  6/14/2019     IR SINOGRAM INJECTION DIAGNOSTIC  7/8/2019     IR VISCERAL ANGIOGRAM  5/10/2019     ORTHOPEDIC SURGERY       OTHER SURGICAL HISTORY      Sinus/nose surgery many years ago     OTHER SURGICAL HISTORY      Trigger thumb        MEDICATIONS:  Current Outpatient Medications   Medication     loratadine (CLARITIN) 10 MG tablet     LOTRISONE 1-0.05 % EX CREA     acetaminophen (TYLENOL) 325 MG tablet     amLODIPine (NORVASC) 5 MG tablet     calcipotriene (DOVONOX) 0.005 % external ointment     lovastatin (MEVACOR) 20 MG tablet     omega 3 1000 MG CAPS     ONE DAILY MULTIPLE VITAMIN OR     tamsulosin (FLOMAX) 0.4 MG capsule     TRIAMTERENE-HCTZ 37.5-25 MG OR CAPS     VIAGRA 100 MG OR TABS     VITAMIN E 800 IU OR CAPS     No current  facility-administered medications for this visit.         ALLERGIES:  Allergies   Allergen Reactions     Aspirin      GI upset     Atorvastatin       Myalgias     Crestor [Rosuvastatin] Other (See Comments)     Joint Pain        SOCIAL HISTORY:  Social History     Socioeconomic History     Marital status:      Spouse name: Not on file     Number of children: Not on file     Years of education: Not on file     Highest education level: Not on file   Occupational History     Not on file   Social Needs     Financial resource strain: Not on file     Food insecurity:     Worry: Not on file     Inability: Not on file     Transportation needs:     Medical: Not on file     Non-medical: Not on file   Tobacco Use     Smoking status: Former Smoker     Smokeless tobacco: Never Used   Substance and Sexual Activity     Alcohol use: Yes     Comment: moderate     Drug use: Never     Sexual activity: Not on file   Lifestyle     Physical activity:     Days per week: Not on file     Minutes per session: Not on file     Stress: Not on file   Relationships     Social connections:     Talks on phone: Not on file     Gets together: Not on file     Attends Jewish service: Not on file     Active member of club or organization: Not on file     Attends meetings of clubs or organizations: Not on file     Relationship status: Not on file     Intimate partner violence:     Fear of current or ex partner: Not on file     Emotionally abused: Not on file     Physically abused: Not on file     Forced sexual activity: Not on file   Other Topics Concern     Not on file   Social History Narrative     Not on file       FAMILY HISTORY:  Family History   Problem Relation Age of Onset     C.A.D. Mother      Breast Cancer Mother         uterine      Genetic Disorder Mother         machular degeneration     Neurologic Disorder Father         parkinson     Eye Disorder Brother         photophobia        PHYSICAL EXAM:  Vital Signs: /80 (BP  Location: Right arm)   Pulse 91   Temp 98.4  F (36.9  C)   Resp 16   SpO2 99%   GEN: Mr. Jayden Don appears completely nontoxic today.  He moves easily to the examination table.   ABD: Abdomen is soft and nontender.  No masses.  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  The area of drainage in the left flank demonstrates a small amount of granulation tissue.   No other abnormalities were noted.   EXT: His extremities are warm and well perfused.       IMPRESSION:  Mr. Jayden Don has continued sinus drainage and he completed a 7 day course of a double strength Bactrim.  I discussed the patient care plan with our  and he recommended repeat Interventional Radiology.  I have discussed this with the patient and the patient has decided to move his care to the [facility] system.

## 2019-10-03 ENCOUNTER — HOSPITAL ENCOUNTER (EMERGENCY)
Facility: CLINIC | Age: 79
Discharge: HOME | End: 2019-10-03
Attending: EMERGENCY MEDICINE | Admitting: EMERGENCY MEDICINE
Payer: COMMERCIAL

## 2019-10-03 ENCOUNTER — APPOINTMENT (OUTPATIENT)
Dept: CT IMAGING | Facility: CLINIC | Age: 79
End: 2019-10-03
Attending: EMERGENCY MEDICINE
Payer: COMMERCIAL

## 2019-10-03 VITALS
OXYGEN SATURATION: 98 % | DIASTOLIC BLOOD PRESSURE: 85 MMHG | RESPIRATION RATE: 16 BRPM | TEMPERATURE: 98.2 F | HEART RATE: 78 BPM | SYSTOLIC BLOOD PRESSURE: 134 MMHG

## 2019-10-03 DIAGNOSIS — T81.89XA DRAINING POSTOPERATIVE WOUND, INITIAL ENCOUNTER: ICD-10-CM

## 2019-10-03 DIAGNOSIS — F41.9 ANXIETY: ICD-10-CM

## 2019-10-03 DIAGNOSIS — R06.02 SHORTNESS OF BREATH: ICD-10-CM

## 2019-10-03 LAB
ALBUMIN SERPL-MCNC: 3.8 G/DL (ref 3.4–5)
ALP SERPL-CCNC: 81 U/L (ref 40–150)
ALT SERPL W P-5'-P-CCNC: 19 U/L (ref 0–70)
ANION GAP SERPL CALCULATED.3IONS-SCNC: 4 MMOL/L (ref 3–14)
APTT PPP: 33 SEC (ref 22–37)
AST SERPL W P-5'-P-CCNC: 12 U/L (ref 0–45)
BILIRUB SERPL-MCNC: 0.6 MG/DL (ref 0.2–1.3)
BUN SERPL-MCNC: 14 MG/DL (ref 7–30)
CALCIUM SERPL-MCNC: 9.3 MG/DL (ref 8.5–10.1)
CHLORIDE SERPL-SCNC: 103 MMOL/L (ref 94–109)
CO2 SERPL-SCNC: 31 MMOL/L (ref 20–32)
CREAT BLD-MCNC: 0.9 MG/DL (ref 0.66–1.25)
CREAT SERPL-MCNC: 0.78 MG/DL (ref 0.66–1.25)
ERYTHROCYTE [DISTWIDTH] IN BLOOD BY AUTOMATED COUNT: 16.3 % (ref 10–15)
GFR SERPL CREATININE-BSD FRML MDRD: 82 ML/MIN/{1.73_M2}
GFR SERPL CREATININE-BSD FRML MDRD: 87 ML/MIN/{1.73_M2}
GLUCOSE SERPL-MCNC: 155 MG/DL (ref 70–99)
HCT VFR BLD AUTO: 46.1 % (ref 40–53)
HGB BLD-MCNC: 14.6 G/DL (ref 13.3–17.7)
INR PPP: 0.94 (ref 0.86–1.14)
MCH RBC QN AUTO: 27.2 PG (ref 26.5–33)
MCHC RBC AUTO-ENTMCNC: 31.7 G/DL (ref 31.5–36.5)
MCV RBC AUTO: 86 FL (ref 78–100)
NT-PROBNP SERPL-MCNC: 265 PG/ML (ref 0–1800)
PLATELET # BLD AUTO: 110 10E9/L (ref 150–450)
POTASSIUM SERPL-SCNC: 4.2 MMOL/L (ref 3.4–5.3)
PROT SERPL-MCNC: 7.6 G/DL (ref 6.8–8.8)
RBC # BLD AUTO: 5.36 10E12/L (ref 4.4–5.9)
SODIUM SERPL-SCNC: 138 MMOL/L (ref 133–144)
TROPONIN I SERPL-MCNC: <0.015 UG/L (ref 0–0.04)
WBC # BLD AUTO: 9.8 10E9/L (ref 4–11)

## 2019-10-03 PROCEDURE — 85730 THROMBOPLASTIN TIME PARTIAL: CPT | Performed by: NURSE PRACTITIONER

## 2019-10-03 PROCEDURE — 82565 ASSAY OF CREATININE: CPT | Mod: 91

## 2019-10-03 PROCEDURE — 71275 CT ANGIOGRAPHY CHEST: CPT

## 2019-10-03 PROCEDURE — 84484 ASSAY OF TROPONIN QUANT: CPT | Performed by: EMERGENCY MEDICINE

## 2019-10-03 PROCEDURE — 25000128 H RX IP 250 OP 636: Performed by: EMERGENCY MEDICINE

## 2019-10-03 PROCEDURE — 99285 EMERGENCY DEPT VISIT HI MDM: CPT | Mod: 25

## 2019-10-03 PROCEDURE — 93005 ELECTROCARDIOGRAM TRACING: CPT

## 2019-10-03 PROCEDURE — 25000132 ZZH RX MED GY IP 250 OP 250 PS 637: Performed by: EMERGENCY MEDICINE

## 2019-10-03 PROCEDURE — 85027 COMPLETE CBC AUTOMATED: CPT | Performed by: NURSE PRACTITIONER

## 2019-10-03 PROCEDURE — 74177 CT ABD & PELVIS W/CONTRAST: CPT

## 2019-10-03 PROCEDURE — 25000125 ZZHC RX 250: Performed by: EMERGENCY MEDICINE

## 2019-10-03 PROCEDURE — 85610 PROTHROMBIN TIME: CPT | Performed by: NURSE PRACTITIONER

## 2019-10-03 PROCEDURE — 83880 ASSAY OF NATRIURETIC PEPTIDE: CPT | Performed by: EMERGENCY MEDICINE

## 2019-10-03 PROCEDURE — 80053 COMPREHEN METABOLIC PANEL: CPT | Performed by: NURSE PRACTITIONER

## 2019-10-03 RX ORDER — IOPAMIDOL 755 MG/ML
500 INJECTION, SOLUTION INTRAVASCULAR ONCE
Status: COMPLETED | OUTPATIENT
Start: 2019-10-03 | End: 2019-10-03

## 2019-10-03 RX ORDER — LORAZEPAM 1 MG/1
1 TABLET ORAL ONCE
Status: COMPLETED | OUTPATIENT
Start: 2019-10-03 | End: 2019-10-03

## 2019-10-03 RX ADMIN — IOPAMIDOL 59 ML: 755 INJECTION, SOLUTION INTRAVENOUS at 18:15

## 2019-10-03 RX ADMIN — SODIUM CHLORIDE 76 ML: 9 INJECTION, SOLUTION INTRAVENOUS at 18:15

## 2019-10-03 RX ADMIN — LORAZEPAM 1 MG: 1 TABLET ORAL at 19:22

## 2019-10-03 ASSESSMENT — ENCOUNTER SYMPTOMS
CHILLS: 1
WEAKNESS: 1
COUGH: 1
FATIGUE: 1
SHORTNESS OF BREATH: 1
FEVER: 0

## 2019-10-03 NOTE — ED AVS SNAPSHOT
[facility] Emergency Department  [address]  [facility] [address]  Phone:  [phone]  Fax:  [phone]                                    Jayden Don   MRN: [id number]    Department:  [facility] Emergency Department   Date of Visit:  10/3/2019           After Visit Summary Signature Page    I have received my discharge instructions, and my questions have been answered. I have discussed any challenges I see with this plan with the nurse or doctor.    ..........................................................................................................................................  Patient/Patient Representative Signature      ..........................................................................................................................................  Patient Representative Print Name and Relationship to Patient    ..................................................               ................................................  Date                                   Time    ..........................................................................................................................................  Reviewed by Signature/Title    ...................................................              ..............................................  Date                                               Time          22EPIC Rev 08/18

## 2019-10-03 NOTE — ED TRIAGE NOTES
Patient states that he was sent from across [facility] for SOB and to rule out PE or Cardiac concerns. May 10th had a splenic laceration that developed into an abscess. Was sent to [facility] for surgery at that time.

## 2019-10-03 NOTE — ED PROVIDER NOTES
History     Chief Complaint:  Shortness of Breath    HPI   Jayden Don is a 78 year old male with a history of HTN, HLD, DM2, and interstitial lung disease who presents to the emergency department for evaluation of shortness of breath. The patient reports he began to feel unlike himself on 9/1 with intermittent shortness and breath and fatigue. He states these symptoms have gradually worsened, prompting him to present to the ED. The patient further reports occasional chills, cough accompanied by a tingling sensation in his chest, weakness in his legs, and belching. He denies chest pain, leg swelling, and fever. The patient notes he has been uptight lately, which exacerbates his symptoms. He denies recent long trips, use of hormone pills, and tobacco use. He further denies personal history of blood clot, cancer, and heart attack, but he notes his twin brother had a heart attack with stent placement. Of note, the patient had a splenic laceration on  5/10 status post IR embolization which was complicated by an abscess at the embolization bed. The patient reports there has been draining from the site of his abscess ever since, but he is not on antibiotics.     CT Abdomen Pelvis w/ Contrast, 9/5:  1. Decreased size of a perisplenic fluid collection when compared with  the prior exam on 6/12/2019, noting interval removal of the  percutaneous drain in July 2019.  2. 3.7 x 1.3 cm subpleural consolidation in the posteromedial right  lower lobe, is unchanged since 5/10/2019. Continued attention on  follow-up exams.  3. Bilateral calcified pleural plaques, likely related to prior  asbestos exposure.  As per radiology.     Allergies:  Atorvastatin   Crestor     Medications:    Flonase  Maxzide   Lovastatin   Flomax  Naprosyn   Norvasc     Past Medical History:    DM2  Stenosis of left carotid artery   Adenomatous colon polyp  Psoriasis   Ventral hernia   Macular degeneration of retina  Diverticulosis   HTN  Pulmonary  fibrosis  HLD  Inguinal Hernia   Major depressive disorder   Hyperplasia of prostate  Splenic abscess   Splenic laceration    Past Surgical History:    Nasal sinus surgery  Finger trigger release   Splenic embolization     Family History:    Parkinson's disease  Cardiovascular disease   Heart disease  Lung disease     Social History:  Presents with wife.   Former smoker, quit in 1976.  Positive for alcohol use.    Marital Status:      Review of Systems   Constitutional: Positive for chills and fatigue. Negative for fever.   Respiratory: Positive for cough and shortness of breath.    Cardiovascular: Negative for chest pain and leg swelling.   Neurological: Positive for weakness.   All other systems reviewed and are negative.    Physical Exam     Patient Vitals for the past 24 hrs:   BP Temp Temp src Pulse Resp SpO2   10/03/19 1845 134/85 -- -- 78 -- 98 %   10/03/19 1840 (!) 145/90 -- -- 80 -- 98 %   10/03/19 1805 -- -- -- -- -- 98 %   10/03/19 1800 116/69 -- -- 76 -- 98 %   10/03/19 1725 -- -- -- -- -- 99 %   10/03/19 1720 -- -- -- -- -- 98 %   10/03/19 1715 (!) 143/86 -- -- 85 -- 99 %   10/03/19 1611 (!) 154/89 98.2  F (36.8  C) Temporal 92 16 99 %      Physical Exam  VS: Reviewed per above  HENT: Mucous membranes moist  EYES: sclera anicteric  CV: Rate as noted, regular rhythm.   RESP: Effort normal. Breath sounds are normal bilaterally.  GI: no tenderness/rebound/guarding, not distended. LUQ sinus tract with scant drainage when area is pressed on. No surrounding erythema or induration  NEURO: Alert, moving all extremities  MSK: No deformity of the extremities  SKIN: Warm and dry    Emergency Department Course     ECG:  Time: 1616  Vent. Rate 85 bpm. ME interval 142. QRS duration 80. QT/QTc 358/426. P-R-T axis 33 8 41.  Normal sinus rhythm.   Normal ECG.  QT no longer prolonged compared to EKG on 5/10/19.  Read time: 1616     Imaging:  Radiographic findings were communicated with the patient and family who  voiced understanding of the findings.    CT Chest (PE) Abdomen Pelvis w Contrast:  1.  No pulmonary embolism.     2.  Inferior subcapsular splenic hematoma has decreased mildly in size and is less dense. Minimal fluid remains around the spleen. Embolization coil.      3.  Multiple calcified pleural plaques compatible with previous asbestos exposure with region of rounded atelectasis in the right lung base.  As per radiology.    Laboratory:  Creatinine POCT: All WNL, Creatinine 0.9.     INR: 0.94    N-Terminal ProBNP: 265    1723 Troponin: <0.015     PTT: 33    CMP: Glucose 155 (H), o/w WNL (Creatinine: 0.78)    CBC: WBC: 9.8, HGB: 14.6, PLT: 110 (L)      Interventions:  1815 Ativan 1 mg PO    Emergency Department Course:  1616 EKG obtained in the ED, see results above.      Nursing notes and vitals reviewed. 1708 I performed an exam of the patient as documented above.     Medicine administered as documented above. Blood drawn. This was sent to the lab for further testing, results above.    The patient was sent for a CT Chest while in the emergency department, findings above.     1753 I rechecked the patient and discussed the results of his workup thus far.     1907 I rechecked the patient and discussed the results of his workup thus far.     Findings and plan explained to the Patient and spouse. Patient discharged home with instructions regarding supportive care, medications, and reasons to return. The importance of close follow-up was reviewed.     I personally reviewed the laboratory results with the Patient and spouse and answered all related questions prior to discharge.    Impression & Plan      Medical Decision Making:  Patient presents to the ER for evaluation of shortness of breath over the past few weeks.  Notably, he has a history of splenic laceration on 5/19 status post IR embolization which was complicated by an abscess at the embolization bed.  He underwent a temporary percutaneous drain.  Per chart  review, patient visited with general surgery on 9/5/2019, where he was endorsing ongoing drainage from the draining sinus tract and had been completing a course of Bactrim.  In this note, patient reportedly told the surgeon that he was switching to the [facility] system.  I am unable to visualize any more recent visits since this time in the medical record.  On arrival here, work of breathing is normal, lung sounds are normal, SPO2 is 99% on room air.  EKG sinus rhythm without signs of ischemia or dysrhythmia.  I considered worsening of underlying interstitial lung disease versus heart failure versus pneumonia versus pneumothorax versus PE.  Patient did have a right lung consolidation on a CT scan from early September.  Given unclear clinical picture, I did obtain a CT of the chest with PE protocol as well as CT of the abdomen to evaluate spleen and potential for ongoing abscess.  This study revealed no acute PE or other intrathoracic process as well as decreasing size of subcapsular splenic hematoma without evidence of abscess.  Other labs notable for negative troponin, normal BNP, no leukocytosis.  Patient reassured by these findings.  He did endorse that he has been having increased anxiety recently and would like some Ativan here.  He is also interested in following up with his primary care physician about a mood stabilizing agent.  I gave him a CD of his CT images today and encouraged him to follow-up with the [facility] surgeon regarding ongoing draining tract at site of prior percutaneous abdominal drain.  No evidence at this time that he requires another round of antibiotics.  Close return precautions were discussed prior to discharge.    Diagnosis:    ICD-10-CM    1. Shortness of breath R06.02    2. Anxiety F41.9    3. Draining postoperative wound, initial encounter T81.89XA      Disposition:  discharged to home    Scribe Disclosure:  Jeanette BENNETT, am serving as a scribe on 10/3/2019 at 5:07 PM to  personally document services performed by Gamaliel Cohen MD based on my observations and the provider's statements to me.      Jeanette Roberts  10/3/2019   [facility] EMERGENCY DEPARTMENT       Gamaliel Cohen MD  10/03/19 [id number]

## 2019-10-04 LAB — INTERPRETATION ECG - MUSE: NORMAL

## 2019-10-04 NOTE — DISCHARGE INSTRUCTIONS
Return for fevers or redness, swelling or worsening draining from the abdominal wound. Follow up with your new surgeon.

## 2019-11-25 LAB — INTERPRETATION ECG - MUSE: NORMAL

## 2019-12-19 ENCOUNTER — APPOINTMENT (OUTPATIENT)
Dept: CT IMAGING | Facility: CLINIC | Age: 79
End: 2019-12-19
Attending: EMERGENCY MEDICINE
Payer: COMMERCIAL

## 2019-12-19 ENCOUNTER — APPOINTMENT (OUTPATIENT)
Dept: MRI IMAGING | Facility: CLINIC | Age: 79
End: 2019-12-19
Attending: EMERGENCY MEDICINE
Payer: COMMERCIAL

## 2019-12-19 ENCOUNTER — HOSPITAL ENCOUNTER (EMERGENCY)
Facility: CLINIC | Age: 79
Discharge: HOME | End: 2019-12-19
Attending: EMERGENCY MEDICINE | Admitting: EMERGENCY MEDICINE
Payer: COMMERCIAL

## 2019-12-19 VITALS
HEART RATE: 88 BPM | TEMPERATURE: 96 F | SYSTOLIC BLOOD PRESSURE: 146 MMHG | RESPIRATION RATE: 18 BRPM | OXYGEN SATURATION: 97 % | DIASTOLIC BLOOD PRESSURE: 86 MMHG

## 2019-12-19 DIAGNOSIS — H53.2 DIPLOPIA: ICD-10-CM

## 2019-12-19 LAB
ANION GAP SERPL CALCULATED.3IONS-SCNC: 4 MMOL/L (ref 3–14)
BASOPHILS # BLD AUTO: 0 10E9/L (ref 0–0.2)
BASOPHILS NFR BLD AUTO: 0.4 %
BUN SERPL-MCNC: 22 MG/DL (ref 7–30)
CALCIUM SERPL-MCNC: 9 MG/DL (ref 8.5–10.1)
CHLORIDE SERPL-SCNC: 103 MMOL/L (ref 94–109)
CO2 SERPL-SCNC: 31 MMOL/L (ref 20–32)
CREAT SERPL-MCNC: 0.93 MG/DL (ref 0.66–1.25)
DIFFERENTIAL METHOD BLD: ABNORMAL
EOSINOPHIL # BLD AUTO: 0.1 10E9/L (ref 0–0.7)
EOSINOPHIL NFR BLD AUTO: 1.2 %
ERYTHROCYTE [DISTWIDTH] IN BLOOD BY AUTOMATED COUNT: 13.9 % (ref 10–15)
GFR SERPL CREATININE-BSD FRML MDRD: 79 ML/MIN/{1.73_M2}
GLUCOSE SERPL-MCNC: 194 MG/DL (ref 70–99)
HCT VFR BLD AUTO: 45.8 % (ref 40–53)
HGB BLD-MCNC: 15.4 G/DL (ref 13.3–17.7)
IMM GRANULOCYTES # BLD: 0 10E9/L (ref 0–0.4)
IMM GRANULOCYTES NFR BLD: 0.1 %
LYMPHOCYTES # BLD AUTO: 1.1 10E9/L (ref 0.8–5.3)
LYMPHOCYTES NFR BLD AUTO: 15 %
MCH RBC QN AUTO: 29.3 PG (ref 26.5–33)
MCHC RBC AUTO-ENTMCNC: 33.6 G/DL (ref 31.5–36.5)
MCV RBC AUTO: 87 FL (ref 78–100)
MONOCYTES # BLD AUTO: 0.5 10E9/L (ref 0–1.3)
MONOCYTES NFR BLD AUTO: 6.9 %
NEUTROPHILS # BLD AUTO: 5.6 10E9/L (ref 1.6–8.3)
NEUTROPHILS NFR BLD AUTO: 76.4 %
NRBC # BLD AUTO: 0 10*3/UL
NRBC BLD AUTO-RTO: 0 /100
PLATELET # BLD AUTO: 126 10E9/L (ref 150–450)
POTASSIUM SERPL-SCNC: 4 MMOL/L (ref 3.4–5.3)
RBC # BLD AUTO: 5.26 10E12/L (ref 4.4–5.9)
SODIUM SERPL-SCNC: 138 MMOL/L (ref 133–144)
T4 FREE SERPL-MCNC: 1.01 NG/DL (ref 0.76–1.46)
TSH SERPL DL<=0.005 MIU/L-ACNC: 4.64 MU/L (ref 0.4–4)
WBC # BLD AUTO: 7.4 10E9/L (ref 4–11)

## 2019-12-19 PROCEDURE — 70553 MRI BRAIN STEM W/O & W/DYE: CPT

## 2019-12-19 PROCEDURE — A9585 GADOBUTROL INJECTION: HCPCS | Performed by: EMERGENCY MEDICINE

## 2019-12-19 PROCEDURE — 83516 IMMUNOASSAY NONANTIBODY: CPT | Mod: XU | Performed by: EMERGENCY MEDICINE

## 2019-12-19 PROCEDURE — 83519 RIA NONANTIBODY: CPT | Performed by: EMERGENCY MEDICINE

## 2019-12-19 PROCEDURE — 83516 IMMUNOASSAY NONANTIBODY: CPT | Performed by: EMERGENCY MEDICINE

## 2019-12-19 PROCEDURE — 84439 ASSAY OF FREE THYROXINE: CPT | Performed by: EMERGENCY MEDICINE

## 2019-12-19 PROCEDURE — 86255 FLUORESCENT ANTIBODY SCREEN: CPT | Performed by: EMERGENCY MEDICINE

## 2019-12-19 PROCEDURE — 25500064 ZZH RX 255 OP 636: Performed by: EMERGENCY MEDICINE

## 2019-12-19 PROCEDURE — 84443 ASSAY THYROID STIM HORMONE: CPT | Performed by: EMERGENCY MEDICINE

## 2019-12-19 PROCEDURE — 80048 BASIC METABOLIC PNL TOTAL CA: CPT | Performed by: EMERGENCY MEDICINE

## 2019-12-19 PROCEDURE — 70450 CT HEAD/BRAIN W/O DYE: CPT

## 2019-12-19 PROCEDURE — 85025 COMPLETE CBC W/AUTO DIFF WBC: CPT | Performed by: EMERGENCY MEDICINE

## 2019-12-19 PROCEDURE — 99285 EMERGENCY DEPT VISIT HI MDM: CPT | Mod: 25

## 2019-12-19 RX ORDER — PYRIDOSTIGMINE BROMIDE 60 MG/1
30 TABLET ORAL 3 TIMES DAILY PRN
Qty: 3 TABLET | Refills: 0 | Status: SHIPPED | OUTPATIENT
Start: 2019-12-19

## 2019-12-19 RX ORDER — PYRIDOSTIGMINE BROMIDE 30 MG/1
30 TABLET ORAL 3 TIMES DAILY PRN
Qty: 5 TABLET | Refills: 0 | Status: SHIPPED | OUTPATIENT
Start: 2019-12-19

## 2019-12-19 RX ORDER — GADOBUTROL 604.72 MG/ML
7.5 INJECTION INTRAVENOUS ONCE
Status: COMPLETED | OUTPATIENT
Start: 2019-12-19 | End: 2019-12-19

## 2019-12-19 RX ADMIN — GADOBUTROL 7 ML: 604.72 INJECTION INTRAVENOUS at 15:20

## 2019-12-19 ASSESSMENT — ENCOUNTER SYMPTOMS
WEAKNESS: 0
NUMBNESS: 0
FACIAL ASYMMETRY: 1
SPEECH DIFFICULTY: 0

## 2019-12-19 NOTE — DISCHARGE INSTRUCTIONS
*You may resume diet and activities as tolerated.  *Take medications as prescribed.  Mestinon if when double vision becomes severe. Continue your current medications  *Follow-up with neurology in the next 2 weeks.    *Return if you develop difficulty in breathing or swallowing, faint or feel like you will faint or become worse in any way.

## 2019-12-19 NOTE — ED TRIAGE NOTES
Double vision since Saturday.  Was seen by opthamology yesterday and suggested to come to ED.  ABCDs intact.

## 2019-12-19 NOTE — ED AVS SNAPSHOT
[facility] Emergency Department  [address]  [facility] [address]  Phone:  [phone]  Fax:  [phone]                                    Jayden Don   MRN: [id number]    Department:  [facility] Emergency Department   Date of Visit:  12/19/2019           After Visit Summary Signature Page    I have received my discharge instructions, and my questions have been answered. I have discussed any challenges I see with this plan with the nurse or doctor.    ..........................................................................................................................................  Patient/Patient Representative Signature      ..........................................................................................................................................  Patient Representative Print Name and Relationship to Patient    ..................................................               ................................................  Date                                   Time    ..........................................................................................................................................  Reviewed by Signature/Title    ...................................................              ..............................................  Date                                               Time          22EPIC Rev 08/18

## 2019-12-19 NOTE — ED PROVIDER NOTES
History     Chief Complaint:  Double Vision Since Saturday      HPI   Jayden Don is a 79 year old male with a history of macular degeneration of the right eye who presents to the emergency department for evaluation of double vision since Saturday. The patient saw his optometrist last night. Friday (12/13)  night, he began to experience double vision when looking at a distance. If he closes either eye the double vision goes away. It has gotten progressively worse since last Monday (12/16). He is currently seeing double when looking at more distant objects. The patient does think his eyelids are droopy but states this isn't really new.  He states that the symptoms seem to come at night. The patient denies numbness, weakness, decreased coordination, or speech difficulty.    Allergies:  Aspirin  Atorvastatin  Crestor    Medications:    Norvasc  Dovonex  Claritin  Mevacor  Flomax  Triamterene  Viagra    Past Medical History:    Splenic laceration  Spleen injury  Splenic abscess  Ventral hernia  Varicose veins  Type 2 diabetes mellitus  Stenosis of left carotid artery  Psoriasis  Adenomatous polyp of colon  Allergic state  Benign prostatic hyperplasia  Hypertension  Macular degeneration of right eye  Obstructive chronic bronchitis with exacerbation    Past Surgical History:    ENT surgery  GI surgery  IR sinogram injection diagnostic x2  IR visceral angiogram  Orthopedic surgery  Sinus/nose surgery  Trigger thumb    Family History:    CAD  Breast cancer  Macular degeneration  Parkinson  Photophobia    Social History:  The patient presents today alone.  Former smoker  Positive for alcohol use.   Negative for drug use.  Marital Status:      Review of Systems   Eyes: Positive for visual disturbance.   Musculoskeletal:        Denies decreased coordination   Neurological: Positive for facial asymmetry. Negative for speech difficulty, weakness and numbness.   All other systems reviewed and are  negative.    Physical Exam     Patient Vitals for the past 24 hrs:   BP Temp Temp src Pulse Resp SpO2   12/19/19 1307 (!) 146/86 96  F (35.6  C) Temporal 88 18 97 %     Physical Exam  General: Well-nourished, appears to be resting comfortably when I enter the room  Eyes: PERRL, conjunctivae pink no scleral icterus or conjunctival injection  ENT:  Moist mucus membranes, posterior oropharynx clear without erythema or exudates  Respiratory:  Lungs clear to auscultation bilaterally, no crackles/rubs/wheezes.  Good air movement  CV: Normal rate and rhythm, no murmurs/rubs/gallops  GI:  Abdomen soft and non-distended.  Normoactive BS.  No tenderness, guarding or rebound  Skin: Warm, dry.  No rashes or petechiae  Musculoskeletal: No peripheral edema or calf tenderness  Neuro: Alert and oriented to person/place/time.  ?lid lab bilaterally. No definite fatigability with looking up. PERRL, EOMI no nystagmus, no aphasia/facial droop/dysarthria, tongue midline, symmetric palatal elevation, normal strength at SCM/trapezius/BUE/BLE, normal coordination to FNF at BUE, normal casual gait, negative romberg, sensation intact to LT over face/BUE/BLE  Psychiatric: Normal affect    Emergency Department Course     Imaging:  Radiology findings were communicated with the patient who voiced understanding of the findings.    CT Head w/o Contrast:  Diffuse cerebral volume loss and cerebral white matter  changes consistent with chronic small vessel ischemic disease. No  evidence for acute intracranial pathology.     Radiation dose for this scan was reduced using automated exposure  control, adjustment of the mA and/or kV according to patient size, or  iterative reconstruction technique.  As per radiology.    MR Brain w/o & w Contrast:  Mild diffuse cerebral volume loss. Otherwise, normal brain  MRI. No evidence for acute intracranial pathology.  As per radiology.    Laboratory:  Laboratory findings were communicated with the patient who voiced  understanding of the findings.    CBC:  (L) o/w WNL. (WBC 7.4, HGB 15.4)   BMP: Glucose 194 (H) o/w WNL (Creatinine 0.93)    TSH with free T4 reflex 4.64 (H)    T4 free 1.01    T4 free In process     Interventions:  1520 Gadavist 7 mL IV    Emergency Department Course:    1314 IV was inserted and blood was drawn for laboratory testing, results above.    1357 The patient was sent for a CT Head w/o Contrast while in the emergency department, results above.     1428 Nursing notes and vitals reviewed.    1431 I performed an exam of the patient as documented above.     1520 The patient was sent for a MR Brain w/o & w Contrast while in the emergency department, results above.     1658 Patient rechecked and updated.     Findings and plan explained to the Patient. Patient discharged home with instructions regarding supportive care, medications, and reasons to return. The importance of close follow-up was reviewed. The patient was prescribed pyridostigmine.    Impression & Plan     Medical Decision Making:  Jayden Don is a 79 year old male who presents to the emergency department today with binocular diplopia.  This is concerning for the possibility of a central process versus myasthenia gravis.  Head CT was obtained prior to me seeing the patient.  This was negative.  An MRI was obtained to rule out stroke and this was also negative.  I do not see definite myasthenia gravis on examination but his symptoms are very concerning for myasthenia gravis.  No swallowing or breathing difficulties.  I discussed this possibility with him and I actually called and spoke with the neurologist on-call Dr. Jaciel Kennedy of [facility], [facility].  I ordered the myasthenia gravis panel of antibodies and confirmed with the lab that we have all the appropriate blood work for the send out evaluations.  Dr. Kennedy is going to help to expedite follow-up neurology evaluation.  He recommended that we give 5 pills of 30  mg of Mestinon that the patient is instructed to take when his diplopia symptoms are the worst in order to see if this helps his symptoms.  He and his wife were in agreement with the plan for discharge and are asked to return should he develop any breathing difficulties, swallowing difficulties or becomes worse anyway.    Discharge Diagnosis:    ICD-10-CM    1. Diplopia H53.2      Disposition:  The patient is discharged to home.    Discharge Medications:  New Prescriptions    PYRIDOSTIGMINE 30 MG TABS    Take 30 mg by mouth 3 times daily as needed (double vision)     Scribe Disclosure:  I, Dontrell Gonzales, am serving as a scribe at 2:30 PM on 12/19/2019 to document services personally performed by Liliam Maria MD based on my observations and the provider's statements to me.      Liliam Maria MD  12/19/19 1802

## 2019-12-21 LAB
ACHR BIND AB SER-SCNC: 0 NMOL/L (ref 0–0.4)
ACHR BLOCK AB/ACHR TOTAL SFR SER: 7 % (ref 0–26)
STRIA MUS IGG SER QL IF: NORMAL

## 2019-12-23 LAB — ACHR MOD AB/ACHR TOTAL SFR SER: 0 %

## 2020-11-13 ENCOUNTER — HOSPITAL ENCOUNTER (EMERGENCY)
Facility: CLINIC | Age: 80
Discharge: HOME | End: 2020-11-13
Attending: EMERGENCY MEDICINE | Admitting: EMERGENCY MEDICINE
Payer: COMMERCIAL

## 2020-11-13 ENCOUNTER — APPOINTMENT (OUTPATIENT)
Dept: CT IMAGING | Facility: CLINIC | Age: 80
End: 2020-11-13
Attending: EMERGENCY MEDICINE
Payer: COMMERCIAL

## 2020-11-13 VITALS
OXYGEN SATURATION: 99 % | HEART RATE: 90 BPM | RESPIRATION RATE: 20 BRPM | TEMPERATURE: 98.2 F | DIASTOLIC BLOOD PRESSURE: 82 MMHG | SYSTOLIC BLOOD PRESSURE: 150 MMHG

## 2020-11-13 DIAGNOSIS — S16.1XXA CERVICAL STRAIN, INITIAL ENCOUNTER: ICD-10-CM

## 2020-11-13 PROCEDURE — 72125 CT NECK SPINE W/O DYE: CPT

## 2020-11-13 PROCEDURE — 250N000013 HC RX MED GY IP 250 OP 250 PS 637: Performed by: EMERGENCY MEDICINE

## 2020-11-13 PROCEDURE — 99284 EMERGENCY DEPT VISIT MOD MDM: CPT | Mod: 25

## 2020-11-13 RX ORDER — CYCLOBENZAPRINE HCL 10 MG
10 TABLET ORAL ONCE
Status: COMPLETED | OUTPATIENT
Start: 2020-11-13 | End: 2020-11-13

## 2020-11-13 RX ORDER — ONDANSETRON 4 MG/1
4 TABLET, ORALLY DISINTEGRATING ORAL EVERY 8 HOURS PRN
Qty: 10 TABLET | Refills: 0 | Status: SHIPPED | OUTPATIENT
Start: 2020-11-13 | End: 2020-11-16

## 2020-11-13 RX ORDER — CYCLOBENZAPRINE HCL 10 MG
10 TABLET ORAL 3 TIMES DAILY PRN
Qty: 18 TABLET | Refills: 0 | Status: SHIPPED | OUTPATIENT
Start: 2020-11-13 | End: 2020-11-19

## 2020-11-13 RX ORDER — ACETAMINOPHEN 500 MG
1000 TABLET ORAL ONCE
Status: COMPLETED | OUTPATIENT
Start: 2020-11-13 | End: 2020-11-13

## 2020-11-13 RX ORDER — ACETAMINOPHEN 500 MG
500-1000 TABLET ORAL EVERY 6 HOURS PRN
Qty: 1 BOTTLE | Refills: 0 | Status: SHIPPED | OUTPATIENT
Start: 2020-11-13

## 2020-11-13 RX ADMIN — CYCLOBENZAPRINE HYDROCHLORIDE 10 MG: 10 TABLET, FILM COATED ORAL at 16:59

## 2020-11-13 RX ADMIN — ACETAMINOPHEN 1000 MG: 500 TABLET, FILM COATED ORAL at 16:59

## 2020-11-13 ASSESSMENT — ENCOUNTER SYMPTOMS
WEAKNESS: 0
NECK PAIN: 1
NUMBNESS: 0
HEADACHES: 0

## 2020-11-13 NOTE — ED NOTES
Bed: ED27  Expected date: 11/13/20  Expected time: 4:09 PM  Means of arrival:   Comments:  Luisito 021 [id number]
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
## 2020-11-13 NOTE — ED AVS SNAPSHOT
[facility] Emergency Dept  [address]  [facility] [address]  Phone: [phone]  Fax: [phone]                                    Jayden Don   MRN: [id number]    Department: [facility] Emergency Dept   Date of Visit: 11/13/2020           After Visit Summary Signature Page    I have received my discharge instructions, and my questions have been answered. I have discussed any challenges I see with this plan with the nurse or doctor.    ..........................................................................................................................................  Patient/Patient Representative Signature      ..........................................................................................................................................  Patient Representative Print Name and Relationship to Patient    ..................................................               ................................................  Date                                   Time    ..........................................................................................................................................  Reviewed by Signature/Title    ...................................................              ..............................................  Date                                               Time          22EPIC Rev 08/18

## 2020-11-14 NOTE — DISCHARGE INSTRUCTIONS
Discharge Instructions  Neck Strain    You have been seen today for a neck sprain or strain.  Neck strains usually result from an injury to the neck. Car accidents, contact sports and falls are common causes of neck strain. Sometimes your neck can start to hurt because of increased activity, muscle tension, an abnormal sleeping position, or because of other problems like arthritis in the neck.     Neck pain usually comes from injured muscles and ligaments. Sometimes there is a herniated ( slipped ) disc. We don t usually do MRI scans to look for these right away, since most herniated discs will get better on their own with time. Today, we did not find any evidence that your neck pain was caused by a serious condition, such as an infection, fracture, or tumor. However, sometimes symptoms develop over time and cannot be found during an emergency visit, so it is very important that you follow up with your primary doctor.    Return to the Emergency Department if:  You have increasing pain in your neck.  You develop difficulty swallowing or breathing.  You have numbness, weakness, or trouble moving your arms or legs.  You have severe dizziness and difficulty walking.  You are unable to control your bladder or bowels.  You develop severe headache or ringing in the ears.    Call your doctor if:   Your neck pain is not controlled with the medicine we gave you.  You are not back to normal within 1 week.    What can I do to help myself at home?  If you had an injury, use cold for the first 1-2 days. Cold helps relieve pain and reduce inflammation.  Apply ice packs to the neck or areas of pain every 1-2 hours for 20 minutes at a time. Place a towel or cloth between your skin and the ice pack.  After the first 2 days, using heat can help with neck pain and stiffness. You may use a warm shower or bath, warm towels on the neck, or a heating pad. Do not sleep with a heating pad, as you can be burned.   Pain medications - You may  take a pain medication such as Tylenol  (acetaminophen), Advil , Nuprin  (ibuprofen) or Aleve  (naproxen).  If you have been given a narcotic such as Vicodin  (hydrocodone with acetaminophen), Percocet  (oxycodone with acetaminophen), codeine, or a muscle relaxant such as Flexeril  (cyclobenzaprine) or Soma  (carisoprodol), do not drive for four hours after you have taken it. If the narcotic contains Tylenol  (acetaminophen), do not take Tylenol  with it. All narcotics will cause constipation, so eat a high fiber diet.    It is usually best to rest the neck for 1-2 days after an injury, then start gentle stretching exercises.   It is helpful to place a small pillow under the nape of your neck to provide proper neutral positioning.   You should stay active and do your usual work as much as you can, unless this involves heavy physical labor. Ask your doctor if you need work restrictions.  If you were given a prescription for medicine here today, be sure to read all of the information (including the package insert) that comes with your prescription.  This will include important information about the medicine, its side effects, and any warnings that you need to know about.  The pharmacist who fills the prescription can provide more information and answer questions you may have about the medicine.  If you have questions or concerns that the pharmacist cannot address, please call or return to the Emergency Department.   Opioid Medication Information    Pain medications are among the most commonly prescribed medicines, so we are including this information for all our patients. If you did not receive pain medication or get a prescription for pain medicine, you can ignore it.     You may have been given a prescription for an opioid (narcotic) pain medicine and/or have received a pain medicine while here in the Emergency Department. These medicines can make you drowsy or impaired. You must not drive, operate dangerous  equipment, or engage in any other dangerous activities while taking these medications. If you drive while taking these medications, you could be arrested for DUI, or driving under the influence. Do not drink any alcohol while you are taking these medications.     Opioid pain medications can cause addiction. If you have a history of chemical dependency of any type, you are at a higher risk of becoming addicted to pain medications.  Only take these prescribed medications to treat your pain when all other options have been tried. Take it for as short a time and as few doses as possible. Store your pain pills in a secure place, as they are frequently stolen and provide a dangerous opportunity for children or visitors in your house to start abusing these powerful medications. We will not replace any lost or stolen medicine.  As soon as your pain is better, you should flush all your remaining medication.     Many prescription pain medications contain Tylenol  (acetaminophen), including Vicodin , Tylenol #3 , Norco , Lortab , and Percocet .  You should not take any extra pills of Tylenol  if you are using these prescription medications or you can get very sick.  Do not ever take more than 3000 mg of acetaminophen in any 24 hour period.    All opioids tend to cause constipation. Drink plenty of water and eat foods that have a lot of fiber, such as fruits, vegetables, prune juice, apple juice and high fiber cereal.  Take a laxative if you don t move your bowels at least every other day. Miralax , Milk of Magnesia, Colace , or Senna  can be used to keep you regular.      Remember that you can always come back to the Emergency Department if you are not able to see your regular doctor in the amount of time listed above, if you get any new symptoms, or if there is anything that worries you.

## 2021-01-14 ENCOUNTER — HEALTH MAINTENANCE LETTER (OUTPATIENT)
Age: 81
End: 2021-01-14

## 2021-05-08 ENCOUNTER — HEALTH MAINTENANCE LETTER (OUTPATIENT)
Age: 81
End: 2021-05-08

## 2021-08-28 ENCOUNTER — HEALTH MAINTENANCE LETTER (OUTPATIENT)
Age: 81
End: 2021-08-28

## 2021-10-23 ENCOUNTER — HEALTH MAINTENANCE LETTER (OUTPATIENT)
Age: 81
End: 2021-10-23

## 2021-12-18 ENCOUNTER — HEALTH MAINTENANCE LETTER (OUTPATIENT)
Age: 81
End: 2021-12-18

## 2022-02-12 ENCOUNTER — HEALTH MAINTENANCE LETTER (OUTPATIENT)
Age: 82
End: 2022-02-12

## 2022-04-09 ENCOUNTER — HEALTH MAINTENANCE LETTER (OUTPATIENT)
Age: 82
End: 2022-04-09

## 2022-07-30 ENCOUNTER — HEALTH MAINTENANCE LETTER (OUTPATIENT)
Age: 82
End: 2022-07-30

## 2022-10-10 ENCOUNTER — HEALTH MAINTENANCE LETTER (OUTPATIENT)
Age: 82
End: 2022-10-10

## 2022-11-27 ENCOUNTER — HEALTH MAINTENANCE LETTER (OUTPATIENT)
Age: 82
End: 2022-11-27

## 2022-12-17 ENCOUNTER — MEDICAL CORRESPONDENCE (OUTPATIENT)
Dept: HEALTH INFORMATION MANAGEMENT | Facility: CLINIC | Age: 82
End: 2022-12-17

## 2022-12-19 ENCOUNTER — TRANSCRIBE ORDERS (OUTPATIENT)
Dept: RESPIRATORY THERAPY | Facility: CLINIC | Age: 82
End: 2022-12-19

## 2022-12-19 ENCOUNTER — TELEPHONE (OUTPATIENT)
Dept: PULMONOLOGY | Facility: CLINIC | Age: 82
End: 2022-12-19

## 2022-12-19 ENCOUNTER — TRANSCRIBE ORDERS (OUTPATIENT)
Dept: OTHER | Age: 82
End: 2022-12-19

## 2022-12-19 ENCOUNTER — MEDICAL CORRESPONDENCE (OUTPATIENT)
Dept: HEALTH INFORMATION MANAGEMENT | Facility: CLINIC | Age: 82
End: 2022-12-19

## 2022-12-19 DIAGNOSIS — G70.01 MYASTHENIA GRAVIS WITH EXACERBATION (H): Primary | ICD-10-CM

## 2022-12-19 DIAGNOSIS — G70.00 MYASTHENIA GRAVIS (H): Primary | ICD-10-CM

## 2022-12-19 NOTE — TELEPHONE ENCOUNTER
Patient Contacted for the patient to call back and schedule the following:    Appointment type: FPFT  Provider: ETHEL  Return date: 12/28/22  Specialty phone number: [phone]  Additional appointment(s) needed: NA  Additonal Notes: Talked with Margaret Ewing, [facility] . Referral for patient states to contact Margaret Ewing to schedule appointments.

## 2023-03-25 ENCOUNTER — HEALTH MAINTENANCE LETTER (OUTPATIENT)
Age: 83
End: 2023-03-25

## 2023-08-20 ENCOUNTER — HEALTH MAINTENANCE LETTER (OUTPATIENT)
Age: 83
End: 2023-08-20

## 2024-01-07 ENCOUNTER — HEALTH MAINTENANCE LETTER (OUTPATIENT)
Age: 84
End: 2024-01-07

## 2024-02-13 NOTE — PROCEDURES
Interventional Radiology Brief Post Procedure Note    Procedure: CT SUBDIAPHRAGM ABSCESS DRAIN W CATH PLACE    Proceduralist: Mel Irizarry MD    Assistant: IR Fellow Physician, Riley Rubio MD    Time Out: Prior to the start of the procedure and with procedural staff participation, I verbally confirmed the patient s identity using two indicators, relevant allergies, that the procedure was appropriate and matched the consent or emergent situation, and that the correct equipment/implants were available. Immediately prior to starting the procedure I conducted the Time Out with the procedural staff and re-confirmed the patient s name, procedure, and site/side. (The Joint Commission universal protocol was followed.)  Yes    Medications   Medication Event Details Admin User Admin Time       Sedation: IR Nurse Monitored Care   Post Procedure Summary:  Prior to the start of the procedure and with procedural staff participation, I verbally confirmed the patient s identity using two indicators, relevant allergies, that the procedure was appropriate and matched the consent or emergent situation, and that the correct equipment/implants were available. Immediately prior to starting the procedure I conducted the Time Out with the procedural staff and re-confirmed the patient s name, procedure, and site/side. (The Joint Commission universal protocol was followed.)  Yes       Sedatives: Fentanyl and Midazolam (Versed)    Vital signs, airway and pulse oximetry were monitored and remained stable throughout the procedure and sedation was maintained until the procedure was complete.  The patient was monitored by staff until sedation discharge criteria were met.    Patient tolerance: Patient tolerated the procedure well with no immediate complications.    Time of sedation in minutes: See dictation        Findings: 12 fr drain placed into the perisplenic hematoma via an intercostal approach.  This was first attempted via a  subcostal approach, however this did not provide appropriate access to the more liquified portion of the hematoma.     Estimated Blood Loss: Minimal    Fluoroscopy Time:  minute(s)    SPECIMENS: Fluid for gram stain and culture.    Complications: 1. None     Condition: Stable    Plan: Post op and drain care as ordered.     Comments: See dictated procedure note for full details.    Riley Rubio MD         None known

## 2024-05-26 ENCOUNTER — HEALTH MAINTENANCE LETTER (OUTPATIENT)
Age: 84
End: 2024-05-26

## (undated) RX ORDER — FENTANYL CITRATE 50 UG/ML
INJECTION, SOLUTION INTRAMUSCULAR; INTRAVENOUS
Status: DISPENSED
Start: 2019-05-10

## (undated) RX ORDER — KETOROLAC TROMETHAMINE 30 MG/ML
INJECTION, SOLUTION INTRAMUSCULAR; INTRAVENOUS
Status: DISPENSED
Start: 2019-05-10

## (undated) RX ORDER — LIDOCAINE HYDROCHLORIDE 10 MG/ML
INJECTION, SOLUTION EPIDURAL; INFILTRATION; INTRACAUDAL; PERINEURAL
Status: DISPENSED
Start: 2019-05-25

## (undated) RX ORDER — SODIUM CHLORIDE 9 MG/ML
INJECTION, SOLUTION INTRAVENOUS
Status: DISPENSED
Start: 2019-06-14

## (undated) RX ORDER — LIDOCAINE HYDROCHLORIDE 10 MG/ML
INJECTION, SOLUTION EPIDURAL; INFILTRATION; INTRACAUDAL; PERINEURAL
Status: DISPENSED
Start: 2019-05-10

## (undated) RX ORDER — FENTANYL CITRATE 50 UG/ML
INJECTION, SOLUTION INTRAMUSCULAR; INTRAVENOUS
Status: DISPENSED
Start: 2019-05-25

## (undated) RX ORDER — CEFAZOLIN SODIUM 2 G/100ML
INJECTION, SOLUTION INTRAVENOUS
Status: DISPENSED
Start: 2019-05-25

## (undated) RX ORDER — LIDOCAINE HYDROCHLORIDE 10 MG/ML
INJECTION, SOLUTION INFILTRATION; PERINEURAL
Status: DISPENSED
Start: 2019-06-04